# Patient Record
Sex: FEMALE | Race: BLACK OR AFRICAN AMERICAN | Employment: UNEMPLOYED | URBAN - METROPOLITAN AREA
[De-identification: names, ages, dates, MRNs, and addresses within clinical notes are randomized per-mention and may not be internally consistent; named-entity substitution may affect disease eponyms.]

---

## 2017-02-14 NOTE — TELEPHONE ENCOUNTER
Patient states the pharmacy needs a new prescription for True Metric meter, test strips and lancets.

## 2017-02-15 RX ORDER — LANCETS 28 GAUGE
EACH MISCELLANEOUS
Qty: 100 LANCET | Refills: 6 | Status: SHIPPED | OUTPATIENT
Start: 2017-02-15 | End: 2017-05-03 | Stop reason: SDUPTHER

## 2017-02-15 RX ORDER — BLOOD-GLUCOSE METER
EACH MISCELLANEOUS
Qty: 1 EACH | Refills: 0 | Status: SHIPPED | OUTPATIENT
Start: 2017-02-15 | End: 2017-12-04

## 2017-03-02 ENCOUNTER — OFFICE VISIT (OUTPATIENT)
Dept: NEUROLOGY | Age: 57
End: 2017-03-02

## 2017-03-02 VITALS
SYSTOLIC BLOOD PRESSURE: 136 MMHG | HEIGHT: 65 IN | DIASTOLIC BLOOD PRESSURE: 74 MMHG | WEIGHT: 118 LBS | BODY MASS INDEX: 19.66 KG/M2 | HEART RATE: 74 BPM | OXYGEN SATURATION: 96 %

## 2017-03-02 DIAGNOSIS — F01.50 VASCULAR DEMENTIA WITHOUT BEHAVIORAL DISTURBANCE (HCC): Primary | ICD-10-CM

## 2017-03-02 DIAGNOSIS — F32.A DEPRESSION, UNSPECIFIED DEPRESSION TYPE: ICD-10-CM

## 2017-03-02 DIAGNOSIS — G44.221 CHRONIC TENSION-TYPE HEADACHE, INTRACTABLE: ICD-10-CM

## 2017-03-02 RX ORDER — SERTRALINE HYDROCHLORIDE 50 MG/1
50 TABLET, FILM COATED ORAL DAILY
Qty: 30 TAB | Refills: 2 | Status: SHIPPED | OUTPATIENT
Start: 2017-03-02 | End: 2017-05-19 | Stop reason: SDUPTHER

## 2017-03-02 NOTE — MR AVS SNAPSHOT
Visit Information Date & Time Provider Department Dept. Phone Encounter #  
 3/2/2017 10:40 AM Vivian Starkey MD Neurology [facility] [place] [facility] [phone] [id number] Follow-up Instructions Return in about 3 months (around 6/2/2017). Your Appointments 4/12/2017 10:45 AM  
ROUTINE CARE with Hernan Ma MD  
[address] [facility]) Appt Note: 4 month follow up DM  
 [address]  
[phone]  
  
   
 [address] [place] [address] [address] [address] Upcoming Health Maintenance Date Due  
 EYE EXAM RETINAL OR DILATED Q1 9/22/2016 BREAST CANCER SCRN MAMMOGRAM 5/17/2017 LIPID PANEL Q1 5/10/2017 HEMOGLOBIN A1C Q6M 6/14/2017 MICROALBUMIN Q1 7/14/2017 FOOT EXAM Q1 1/17/2018 PAP AKA CERVICAL CYTOLOGY 5/13/2021 COLONOSCOPY 11/12/2025 DTaP/Tdap/Td series (2 - Td) 12/14/2026 Allergies as of 3/2/2017  Review Complete On: 3/2/2017 By: Villa Rhoades LPN Severity Noted Reaction Type Reactions Namenda [Memantine]  11/17/2016    Other (comments) Side effects Current Immunizations  Reviewed on 12/14/2016 No immunizations on file. Not reviewed this visit Vitals BP  
  
  
  
  
  
 136/74 (BP 1 Location: Right arm, BP Patient Position: Sitting) Vitals History BMI and BSA Data Body Mass Index Body Surface Area 19.64 kg/m 2 1.57 m 2 Preferred Pharmacy Pharmacy Name Phone [facility]ACY [facility][id number]- [address] AT [place] [address] [phone] Your Updated Medication List  
  
   
This list is accurate as of: 3/2/17 11:07 AM.  Always use your most recent med list.  
  
  
  
  
 atorvastatin 10 mg tablet Commonly known as:  LIPITOR  
TAKE 1 TABLET BY MOUTH DAILY. * Blood-Glucose Meter monitoring kit Test daily or as directed * Blood-Glucose Meter Misc Commonly known as:  TRUE METRIX GLUCOSE METER Test daily. butalbital-acetaminophen-caffeine -40 mg per tablet Commonly known as:  [employer] Take 1 Tab by mouth every six (6) hours as needed for Pain. Max Daily Amount: 4 Tabs. clopidogrel 75 mg Tab Commonly known as:  PLAVIX TAKE 1 TAB BY MOUTH DAILY. FOR STROKE PREVENTION. donepezil 10 mg tablet Commonly known as:  ARICEPT  
TAKE 1 TABLET BY MOUTH AT NIGHT * glucose blood VI test strips strip Commonly known as:  blood glucose test  
Test daily or as directed * glucose blood VI test strips strip Commonly known as:  TRUE METRIX GLUCOSE TEST STRIP Test daily. metFORMIN  mg tablet Commonly known as:  GLUCOPHAGE XR  
TAKE 2 TABLETS BY MOUTH ONCE A DAY  
  
 metoprolol tartrate 25 mg tablet Commonly known as:  LOPRESSOR  
TAKE 0.5 TABS BY MOUTH TWO (2) TIMES A DAY. multivitamin tablet Commonly known as:  ONE A DAY Take 1 Tab by mouth daily. * ONETOUCH DELICA LANCETS 30 gauge Misc Generic drug:  lancets USE AS DIRECTED * lancets 28 gauge Misc Test daily. * Notice: This list has 6 medication(s) that are the same as other medications prescribed for you. Read the directions carefully, and ask your doctor or other care provider to review them with you. Follow-up Instructions Return in about 3 months (around 6/2/2017). Introducing [address]! Dear Sesar Johnson: Thank you for requesting a [employer] account. Our records indicate that you already have an active Inter[employer] account. You can access your account anytime at [email or web address] Did you know that you can access your hospital and ER discharge instructions at any time in Intercasting? You can also review all of your test results from your hospital stay or ER visit. Additional Information If you have questions, please visit the Frequently Asked Questions section of the [facility] website at [email or web address] Remember, [facility] is NOT to be used for urgent needs. For medical emergencies, dial 911. Now available from your iPhone and Android! Please provide this summary of care documentation to your next provider. Your primary care clinician is listed as Yoanna Simpson. If you have any questions after today's visit, please call [phone].

## 2017-03-02 NOTE — PROGRESS NOTES
Interval HPI:   This is a 64 y.o. 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 Daughter reports patient tolerating the Aricept 10 mg QHS much better than the Namenda in the past.  Memory seems about the same to her. However, she has noticed that patient has very little motivation to do her normal activities around the house or her exercises as compared to the past.  Daughter didn't notice this in the past, prior to starting Aricept. Asked patient whether she feels down, sad, depressed, reduced motivation, or reduced interest and she says no, just that some days she doesn't feel like doing anything. Daughter says it's everyday for the past 2-3 months. Neuropsychology testing indicated that she has associated mild depression. Other complaint is that she's been having frequent headaches, daily and 2-3 days a week the headache is moderate so daughter gives Fioricet tablet. Pt describes pain as starting in back of neck radiating up back of head. She a CT Neck in March of 2016 for c/o facial swelling, which showed cervical spondylosis. Pt denies having any frequent or severe neck pain. Last issue is that Daughter is interested in becoming Representative Payee for patient, as patient has apparently been late to pay bills, or not able to balance her checking account. Neuropsychology testing indicated that \"while she is competent, she needs assistance with high level decision making and supervision for medications and finances.  She is able to assign a POA, and this should be done soon, if it has not been done so already. \"     Continues taking  Plavix 75 mg/ day, Lipitor 10 mg QHS, BP and DM meds    =================  Brief Hx: Right MCA territory infarction in June 2015, treated at [facility], resultant mild left hemiparesis/ spastic gait. CTA H+N: no aneurysm, mild/ minimal ICA stenosis, right MCA narrowing consistent with prior infarction. Seen by Neuropsychology/ Dr. Ceci Beasley. Dx with mild-moderate dementia (vascular and/or alzheimer's), mild depression. Brief ROS: as above or otherwise negative  There have been no significant changes in PMHx, PSHx, SHx except as noted above. Allergies   Allergen Reactions    Namenda [Memantine] Other (comments)     Side effects     Current Outpatient Prescriptions   Medication Sig Dispense Refill    sertraline (ZOLOFT) 50 mg tablet Take 1 Tab by mouth daily. For depression and frequent headaches 30 Tab 2    Blood-Glucose Meter (TRUE METRIX GLUCOSE METER) misc Test daily. 1 Each 0    glucose blood VI test strips (TRUE METRIX GLUCOSE TEST STRIP) strip Test daily. 100 Strip 6    lancets 28 gauge misc Test daily. 100 Lancet 6    donepezil (ARICEPT) 10 mg tablet TAKE 1 TABLET BY MOUTH AT NIGHT 30 Tab 0    ONETOUCH DELICA LANCETS 30 gauge misc USE AS DIRECTED  99    metFORMIN ER (GLUCOPHAGE XR) 750 mg tablet TAKE 2 TABLETS BY MOUTH ONCE A DAY 60 Tab 5    clopidogrel (PLAVIX) 75 mg tablet TAKE 1 TAB BY MOUTH DAILY. FOR STROKE PREVENTION. 90 Tab 3    metoprolol tartrate (LOPRESSOR) 25 mg tablet TAKE 0.5 TABS BY MOUTH TWO (2) TIMES A DAY. 90 Tab 3    atorvastatin (LIPITOR) 10 mg tablet TAKE 1 TABLET BY MOUTH DAILY. 90 Tab 3    butalbital-acetaminophen-caffeine (FIORICET) -40 mg per tablet Take 1 Tab by mouth every six (6) hours as needed for Pain. Max Daily Amount: 4 Tabs. 30 Tab 2    Blood-Glucose Meter monitoring kit Test daily or as directed 1 Kit 0    glucose blood VI test strips (BLOOD GLUCOSE TEST) strip Test daily or as directed 100 Strip prn    multivitamin (ONE A DAY) tablet Take 1 Tab by mouth daily. Physical Exam  Blood pressure 136/74, pulse 74, height 5' 5\" (1.651 m), weight 53.5 kg (118 lb), SpO2 96 %.     No acute distress, resting, flat affect, doesn't speak much  Head: no right or left occipital or suboccipital tenderness  Skin: no rashes    Focused Neurological Exam     Mental status: Alert and oriented to person, place situation. Language: normal fluency and comprehension; no dysarthria. CNs:   Visual fields grossly normal  Extraocular movements intact, no nystagmus  Face appears symmetric and facial strength normal.    Hearing is intact to casual conversation. Sensory: not tested today    Motor:  Left arm and le/ 5  Right arm and le/ 5  Left side spasticity    Reflexes: increased on left compared to right    Gait: spastic left hemparesis    Impression      ICD-10-CM ICD-9-CM    1. Vascular dementia without behavioral disturbance F01.50 290.40    2. Depression, unspecified depression type F32.9 311 sertraline (ZOLOFT) 50 mg tablet   3. Chronic tension-type headache, intractable G44.221 339.12 sertraline (ZOLOFT) 50 mg tablet        Mixed mild-moderate vascular-alzheimer's dementia by cognitive testing    Continue Aricept 10 mg QHS (didn't tolerate Namenda). Rx'd Zoloft 50 mg QAM for for associated depression and frequent headaches. Encouraged patient to try and get out of the house when weather is good, take a walk, do puzzles at home as all of that helps her memory. Will sign representative payee form when sent by daughter.   Follow up 3 months

## 2017-03-21 ENCOUNTER — OFFICE VISIT (OUTPATIENT)
Dept: FAMILY MEDICINE CLINIC | Age: 57
End: 2017-03-21

## 2017-03-21 VITALS
RESPIRATION RATE: 20 BRPM | BODY MASS INDEX: 19.79 KG/M2 | WEIGHT: 118.8 LBS | HEIGHT: 65 IN | HEART RATE: 63 BPM | TEMPERATURE: 98 F | SYSTOLIC BLOOD PRESSURE: 132 MMHG | DIASTOLIC BLOOD PRESSURE: 71 MMHG

## 2017-03-21 DIAGNOSIS — L02.419 ABSCESS OF AXILLARY REGION: Primary | ICD-10-CM

## 2017-03-21 RX ORDER — METOPROLOL TARTRATE 25 MG/1
TABLET, FILM COATED ORAL
COMMUNITY
Start: 2016-12-19 | End: 2016-12-19

## 2017-03-21 RX ORDER — SULFAMETHOXAZOLE AND TRIMETHOPRIM 800; 160 MG/1; MG/1
1 TABLET ORAL 2 TIMES DAILY
Qty: 20 TAB | Refills: 0 | Status: SHIPPED | OUTPATIENT
Start: 2017-03-21 | End: 2017-03-31

## 2017-03-21 RX ORDER — BUTALBITAL, ACETAMINOPHEN AND CAFFEINE 50; 325; 40 MG/1; MG/1; MG/1
CAPSULE ORAL
COMMUNITY
Start: 2016-12-19 | End: 2016-12-19

## 2017-03-21 RX ORDER — BISMUTH SUBSALICYLATE 262 MG
TABLET,CHEWABLE ORAL DAILY
COMMUNITY
Start: 2016-12-19 | End: 2016-12-19

## 2017-03-21 RX ORDER — DONEPEZIL HYDROCHLORIDE 10 MG/1
TABLET, FILM COATED ORAL
COMMUNITY
Start: 2016-12-19 | End: 2016-12-19

## 2017-03-21 RX ORDER — METFORMIN HYDROCHLORIDE 750 MG/1
TABLET, EXTENDED RELEASE ORAL
COMMUNITY
Start: 2016-12-19 | End: 2016-12-19

## 2017-03-21 RX ORDER — ATORVASTATIN CALCIUM 10 MG/1
10 TABLET, FILM COATED ORAL DAILY
COMMUNITY
Start: 2016-12-19 | End: 2016-12-19

## 2017-03-21 RX ORDER — CLOPIDOGREL BISULFATE 75 MG/1
TABLET ORAL DAILY
COMMUNITY
Start: 2016-12-19 | End: 2016-12-19

## 2017-03-21 RX ORDER — UREA 40 %
CREAM (GRAM) TOPICAL
Refills: 1 | COMMUNITY
Start: 2017-01-18 | End: 2017-12-04

## 2017-03-21 NOTE — PROGRESS NOTES
HISTORY OF PRESENT ILLNESS  Jeanette Green is a 64 y.o. female. Cyst   The history is provided by the patient and relative (her daughter is with her. She has a cyst under her left arm.). This is a new problem. Episode onset: several days ago. Episode frequency: intermittently. The problem has been gradually improving. Associated symptoms comments: She is not sure if it has been draining, but it was sore yesterday, but not today. . Nothing aggravates the symptoms. Nothing relieves the symptoms. She has tried nothing for the symptoms. Review of Systems   Constitutional: Negative for chills and fever. Skin:        Axillary cyst, as previously mentioned       Visit Vitals    /71 (BP 1 Location: Right arm, BP Patient Position: Sitting)    Pulse 63    Temp 98 °F (36.7 °C) (Oral)    Resp 20    Ht 5' 5\" (1.651 m)    Wt 118 lb 12.8 oz (53.9 kg)    BMI 19.77 kg/m2     Physical Exam   Constitutional: She is oriented to person, place, and time. She appears well-developed and well-nourished. No distress. Neurological: She is alert and oriented to person, place, and time. Skin: She is not diaphoretic. Tender, marble sized, left axillary abscess, freely draining pus. ASSESSMENT and PLAN    ICD-10-CM ICD-9-CM    1. Abscess of axillary region L02.419 682.3 trimethoprim-sulfamethoxazole (BACTRIM DS) 160-800 mg per tablet      AEROBIC BACTERIAL CULTURE        Draining, no longer painful  Frequent, warm compresses to encourage drainage  Bactrim  Culture     Follow-up Disposition:  Return if symptoms worsen or fail to improve. Reviewed plan of care. Patient has provided input and agrees with goals.

## 2017-03-21 NOTE — MR AVS SNAPSHOT
Visit Information Date & Time Provider Department Dept. Phone Encounter #  
 3/21/2017  1:15 PM Lissette Agarwal [address] [id number] Follow-up Instructions Return if symptoms worsen or fail to improve. Your Appointments 4/12/2017 10:45 AM  
ROUTINE CARE with Lissette Agarwal MD  
[address]) Appt Note: 4 month follow up DM  
 [address] [facility] [address]  
[phone]  
  
   
 [facility] [address] [address]  
  
    
 6/8/2017 10:40 AM  
Follow Up with Lori Stewart MD  
Neurology [place] [address] ([address]) Appt Note: follow up dementia [place] [address] [facility] [address] [address] [phone]  
  
   
 [place] [address] [id number] [address] Upcoming Health Maintenance Date Due  
 EYE EXAM RETINAL OR DILATED Q1 9/22/2016 BREAST CANCER SCRN MAMMOGRAM 5/17/2017 LIPID PANEL Q1 5/10/2017 HEMOGLOBIN A1C Q6M 6/14/2017 MICROALBUMIN Q1 7/14/2017 FOOT EXAM Q1 1/17/2018 PAP AKA CERVICAL CYTOLOGY 5/13/2021 COLONOSCOPY 11/12/2025 DTaP/Tdap/Td series (2 - Td) 12/14/2026 Allergies as of 3/21/2017  Review Complete On: 3/21/2017 By: Lissette Agarwal MD  
  
 Severity Noted Reaction Type Reactions Namenda [Memantine]  11/17/2016    Other (comments) Side effects Current Immunizations  Reviewed on 12/14/2016 No immunizations on file. Not reviewed this visit You Were Diagnosed With   
  
 Codes Comments Abscess of axillary region    -  Primary ICD-10-CM: L02.419 ICD-9-CM: 887. 3 Vitals BP Pulse Temp Resp Height(growth percentile) Weight(growth percentile) 132/71 (BP 1 Location: Right arm, BP Patient Position: Sitting) 63 98 °F (36.7 °C) (Oral) 20 5' 5\" (1.651 m) 118 lb 12.8 oz (53.9 kg) BMI OB Status Smoking Status 19.77 kg/m2 Postmenopausal Former Smoker Vitals History BMI and BSA Data Body Mass Index Body Surface Area  
 19.77 kg/m 2 1.57 m 2 Preferred Pharmacy Pharmacy Name Phone [id number] [address] AT [address] [phone] Your Updated Medication List  
  
   
This list is accurate as of: 3/21/17  2:42 PM.  Always use your most recent med list.  
  
  
  
  
 atorvastatin 10 mg tablet Commonly known as:  LIPITOR  
TAKE 1 TABLET BY MOUTH DAILY. * Blood-Glucose Meter monitoring kit Test daily or as directed * Blood-Glucose Meter Misc Commonly known as:  TRUE METRIX GLUCOSE METER Test daily. butalbital-acetaminophen-caffeine -40 mg per tablet Commonly known as:  [employer] Take 1 Tab by mouth every six (6) hours as needed for Pain. Max Daily Amount: 4 Tabs. clopidogrel 75 mg Tab Commonly known as:  PLAVIX TAKE 1 TAB BY MOUTH DAILY. FOR STROKE PREVENTION. donepezil 10 mg tablet Commonly known as:  ARICEPT  
TAKE 1 TABLET BY MOUTH AT NIGHT  
  
 glucose blood VI test strips strip Commonly known as:  TRUE METRIX GLUCOSE TEST STRIP Test daily. lancets 28 gauge Misc Test daily. metFORMIN  mg tablet Commonly known as:  GLUCOPHAGE XR  
TAKE 2 TABLETS BY MOUTH ONCE A DAY  
  
 metoprolol tartrate 25 mg tablet Commonly known as:  LOPRESSOR  
TAKE 0.5 TABS BY MOUTH TWO (2) TIMES A DAY. multivitamin tablet Commonly known as:  ONE A DAY Take 1 Tab by mouth daily. sertraline 50 mg tablet Commonly known as:  ZOLOFT Take 1 Tab by mouth daily. For depression and frequent headaches  
  
 trimethoprim-sulfamethoxazole 160-800 mg per tablet Commonly known as:  BACTRIM DS Take 1 Tab by mouth two (2) times a day for 10 days. urea 40 % topical cream  
Commonly known as:  CARMOL  
APPLY TO AFFECTED AREA TWICE DAILY * Notice: This list has 2 medication(s) that are the same as other medications prescribed for you. Read the directions carefully, and ask your doctor or other care provider to review them with you. Prescriptions Sent to Pharmacy Refills  
 trimethoprim-sulfamethoxazole (BACTRIM DS) 160-800 mg per tablet 0 Sig: Take 1 Tab by mouth two (2) times a day for 10 days. Class: Normal  
 Pharmacy: [facility]/pharmacy #[id number] [address] [place] [address] #: [phone] Route: Oral  
  
Follow-up Instructions Return if symptoms worsen or fail to improve. Introducing [facility]! Dear Cindy Santana: Thank you for requesting a mWater account. Our records indicate that you already have an active mWater account. You can access your account anytime at [email or web address] Did you know that you can access your hospital and ER discharge instructions at any time in mWater? You can also review all of your test results from your hospital stay or ER visit. Additional Information If you have questions, please visit the Frequently Asked Questions section of the mWater website at [email or web address] Remember, mWater is NOT to be used for urgent needs. For medical emergencies, dial 911. Now available from your iPhone and Android! Please provide this summary of care documentation to your next provider. Your primary care clinician is listed as Linda Cardoza. If you have any questions after today's visit, please call [phone].

## 2017-03-24 LAB — BACTERIA SPEC AEROBE CULT: NORMAL

## 2017-04-12 ENCOUNTER — OFFICE VISIT (OUTPATIENT)
Dept: FAMILY MEDICINE CLINIC | Age: 57
End: 2017-04-12

## 2017-04-12 VITALS
WEIGHT: 117.6 LBS | TEMPERATURE: 98.1 F | HEART RATE: 60 BPM | DIASTOLIC BLOOD PRESSURE: 60 MMHG | BODY MASS INDEX: 19.59 KG/M2 | SYSTOLIC BLOOD PRESSURE: 127 MMHG | HEIGHT: 65 IN | RESPIRATION RATE: 16 BRPM

## 2017-04-12 DIAGNOSIS — E11.9 TYPE 2 DIABETES MELLITUS WITHOUT COMPLICATION, WITHOUT LONG-TERM CURRENT USE OF INSULIN (HCC): Primary | ICD-10-CM

## 2017-04-12 DIAGNOSIS — Z12.39 SCREENING FOR BREAST CANCER: ICD-10-CM

## 2017-04-12 DIAGNOSIS — I10 ESSENTIAL HYPERTENSION: ICD-10-CM

## 2017-04-12 RX ORDER — LANCETS 30 GAUGE
EACH MISCELLANEOUS
Refills: 6 | COMMUNITY
Start: 2017-03-27 | End: 2017-12-04

## 2017-04-12 NOTE — PROGRESS NOTES
HISTORY OF PRESENT ILLNESS  Cora Suarez is a 64 y.o. female. Diabetes   The history is provided by the patient (her FBS have mostly been in the 80's to 130's, recently lower, including a number of them being below 70). This is a chronic problem. The current episode started more than 1 week ago. The problem occurs constantly. The problem has been gradually improving. Pertinent negatives include no chest pain, no abdominal pain, no headaches and no shortness of breath. Nothing aggravates the symptoms. The symptoms are relieved by medications. Treatments tried: metformin. The treatment provided significant relief. Review of Systems   Constitutional: Negative for weight loss. No weight gain   Eyes: Negative for blurred vision. Respiratory: Negative for shortness of breath. Cardiovascular: Negative for chest pain and leg swelling. Gastrointestinal: Negative for abdominal pain. Genitourinary:        No polyuria   Neurological: Negative for dizziness, sensory change, speech change, focal weakness and headaches. Endo/Heme/Allergies: Positive for polydipsia. Visit Vitals    /60 (BP 1 Location: Right arm, BP Patient Position: Sitting)    Pulse 60    Temp 98.1 °F (36.7 °C) (Oral)    Resp 16    Ht 5' 5\" (1.651 m)    Wt 117 lb 9.6 oz (53.3 kg)    BMI 19.57 kg/m2     Physical Exam   Constitutional: She is oriented to person, place, and time. She appears well-developed and well-nourished. No distress. Cardiovascular: Normal rate, regular rhythm and normal heart sounds. Exam reveals no gallop and no friction rub. No murmur heard. Pulmonary/Chest: Effort normal and breath sounds normal. No respiratory distress. She has no wheezes. She has no rales. Musculoskeletal: She exhibits no edema. Neurological: She is alert and oriented to person, place, and time. Skin: Skin is warm and dry. She is not diaphoretic. Nursing note and vitals reviewed.       ASSESSMENT and PLAN ICD-10-CM ICD-9-CM    1. Type 2 diabetes mellitus without complication, without long-term current use of insulin (HCC) U81.5 672.95 METABOLIC PANEL, BASIC      LIPID PANEL      HEPATIC FUNCTION PANEL      HEMOGLOBIN A1C WITH EAG   2. Essential hypertension I46 265.4 METABOLIC PANEL, BASIC   3. Screening for breast cancer Z12.39 V76.10 SIVAKUMAR MAMMO BI SCREENING INCL CAD        Over controlled diabetes  Blood pressure controlled  Stop metformin  Labs per orders. Mammogram     Follow-up Disposition:  Return in about 4 months (around 8/12/2017) for diabetes. Reviewed plan of care. Patient has provided input and agrees with goals.

## 2017-04-12 NOTE — MR AVS SNAPSHOT
Visit Information Date & Time Provider Department Dept. Phone Encounter #  
 4/12/2017 10:45 AM Lupe Chatman MD [address] [place] [address] [id number] Follow-up Instructions Return in about 4 months (around 8/12/2017) for diabetes. Your Appointments 6/8/2017 10:40 AM  
Follow Up with Gregorio Moncada MD  
Neurology [address] ([facility]) Appt Note: follow up dementia [place] [address] [facility] 99 [id number] [phone]  
  
   
 [place] [address] [id number] [address] Upcoming Health Maintenance Date Due  
 EYE EXAM RETINAL OR DILATED Q1 9/22/2016 LIPID PANEL Q1 5/10/2017 BREAST CANCER SCRN MAMMOGRAM 5/17/2017 HEMOGLOBIN A1C Q6M 6/14/2017 MICROALBUMIN Q1 7/14/2017 FOOT EXAM Q1 1/17/2018 PAP AKA CERVICAL CYTOLOGY 5/13/2021 COLONOSCOPY 11/12/2025 DTaP/Tdap/Td series (2 - Td) 12/14/2026 Allergies as of 4/12/2017  Review Complete On: 4/12/2017 By: Lupe Chatman MD  
  
 Severity Noted Reaction Type Reactions Namenda [Memantine]  11/17/2016    Other (comments) Side effects Current Immunizations  Reviewed on 12/14/2016 No immunizations on file. Not reviewed this visit You Were Diagnosed With   
  
 Codes Comments Type 2 diabetes mellitus without complication, without long-term current use of insulin (HCC)    -  Primary ICD-10-CM: E11.9 ICD-9-CM: 250.00 Essential hypertension     ICD-10-CM: I10 
ICD-9-CM: 401.9 Screening for breast cancer     ICD-10-CM: Z12.39 
ICD-9-CM: V76.10 Vitals BP Pulse Temp Resp Height(growth percentile) Weight(growth percentile) 127/60 (BP 1 Location: Right arm, BP Patient Position: Sitting) 60 98.1 °F (36.7 °C) (Oral) 16 5' 5\" (1.651 m) 117 lb 9.6 oz (53.3 kg) BMI OB Status Smoking Status 19.57 kg/m2 Postmenopausal Former Smoker BMI and BSA Data Body Mass Index Body Surface Area  
 19.57 kg/m 2 1.56 m 2 Preferred Pharmacy [facility] Phone [id number] [address] [facility] [phone] Your Updated Medication List  
  
   
This list is accurate as of: 4/12/17 11:57 AM.  Always use your most recent med list.  
  
  
  
  
 atorvastatin 10 mg tablet Commonly known as:  LIPITOR  
TAKE 1 TABLET BY MOUTH DAILY. * Blood-Glucose Meter monitoring kit Test daily or as directed * Blood-Glucose Meter Misc Commonly known as:  TRUE METRIX GLUCOSE METER Test daily. butalbital-acetaminophen-caffeine -40 mg per tablet Commonly known as:  [employer] Take 1 Tab by mouth every six (6) hours as needed for Pain. Max Daily Amount: 4 Tabs. clopidogrel 75 mg Tab Commonly known as:  PLAVIX TAKE 1 TAB BY MOUTH DAILY. FOR STROKE PREVENTION. donepezil 10 mg tablet Commonly known as:  ARICEPT  
TAKE 1 TABLET BY MOUTH AT NIGHT  
  
 glucose blood VI test strips strip Commonly known as:  TRUE METRIX GLUCOSE TEST STRIP Test daily. * lancets 28 gauge Misc Test daily. * ULTRA THIN LANCETS 30 gauge Misc Generic drug:  lancets USE AS DIRECTED DAILY  
  
 metoprolol tartrate 25 mg tablet Commonly known as:  LOPRESSOR  
TAKE 0.5 TABS BY MOUTH TWO (2) TIMES A DAY. multivitamin tablet Commonly known as:  ONE A DAY Take 1 Tab by mouth daily. sertraline 50 mg tablet Commonly known as:  ZOLOFT Take 1 Tab by mouth daily. For depression and frequent headaches  
  
 urea 40 % topical cream  
Commonly known as:  CARMOL  
APPLY TO AFFECTED AREA TWICE DAILY * Notice: This list has 4 medication(s) that are the same as other medications prescribed for you. Read the directions carefully, and ask your doctor or other care provider to review them with you. We Performed the Following HEMOGLOBIN A1C WITH EAG [66770 CPT(R)] HEPATIC FUNCTION PANEL [12349 CPT(R)] LIPID PANEL [32706 CPT(R)] METABOLIC PANEL, BASIC [57489 CPT(R)] Follow-up Instructions Return in about 4 months (around 8/12/2017) for diabetes. To-Do List   
 04/17/2017 Imaging:  SIVAKUMAR MAMMO BI SCREENING INCL CAD Introducing [facility]! Dear Radha Tracey: Thank you for requesting a [employer] account. Our records indicate that you already have an active [employer] Integrated account. You can access your account anytime at [email or web address] Did you know that you can access your hospital and ER discharge instructions at any time in [employer] Integrated? You can also review all of your test results from your hospital stay or ER visit. Additional Information If you have questions, please visit the Frequently Asked Questions section of the [employer] website at [email or web address] Remember, [employer] Integrated is NOT to be used for urgent needs. For medical emergencies, dial 911. Now available from your iPhone and Android! Please provide this summary of care documentation to your next provider. Your primary care clinician is listed as Deborah Martinez. If you have any questions after today's visit, please call [phone].

## 2017-04-15 LAB
ALBUMIN SERPL-MCNC: 4.1 G/DL (ref 3.5–5.5)
ALP SERPL-CCNC: 73 IU/L (ref 39–117)
ALT SERPL-CCNC: 60 IU/L (ref 0–32)
AST SERPL-CCNC: 51 IU/L (ref 0–40)
BILIRUB DIRECT SERPL-MCNC: 0.08 MG/DL (ref 0–0.4)
BILIRUB SERPL-MCNC: 0.3 MG/DL (ref 0–1.2)
BUN SERPL-MCNC: 12 MG/DL (ref 6–24)
BUN/CREAT SERPL: 15 (ref 9–23)
CALCIUM SERPL-MCNC: 9.2 MG/DL (ref 8.7–10.2)
CHLORIDE SERPL-SCNC: 104 MMOL/L (ref 96–106)
CHOLEST SERPL-MCNC: 102 MG/DL (ref 100–199)
CO2 SERPL-SCNC: 25 MMOL/L (ref 18–29)
CREAT SERPL-MCNC: 0.79 MG/DL (ref 0.57–1)
EST. AVERAGE GLUCOSE BLD GHB EST-MCNC: 143 MG/DL
GLUCOSE SERPL-MCNC: 113 MG/DL (ref 65–99)
HBA1C MFR BLD: 6.6 % (ref 4.8–5.6)
HDLC SERPL-MCNC: 48 MG/DL
INTERPRETATION, 910389: NORMAL
LDLC SERPL CALC-MCNC: 37 MG/DL (ref 0–99)
Lab: NORMAL
POTASSIUM SERPL-SCNC: 4.5 MMOL/L (ref 3.5–5.2)
PROT SERPL-MCNC: 7 G/DL (ref 6–8.5)
SODIUM SERPL-SCNC: 143 MMOL/L (ref 134–144)
TRIGL SERPL-MCNC: 85 MG/DL (ref 0–149)
VLDLC SERPL CALC-MCNC: 17 MG/DL (ref 5–40)

## 2017-05-04 RX ORDER — LANCETS 28 GAUGE
EACH MISCELLANEOUS
Qty: 100 LANCET | Refills: 6 | Status: SHIPPED | OUTPATIENT
Start: 2017-05-04 | End: 2017-12-04

## 2017-05-23 ENCOUNTER — OFFICE VISIT (OUTPATIENT)
Dept: OBGYN CLINIC | Age: 57
End: 2017-05-23

## 2017-05-23 VITALS
HEART RATE: 59 BPM | SYSTOLIC BLOOD PRESSURE: 122 MMHG | HEIGHT: 65 IN | WEIGHT: 116 LBS | DIASTOLIC BLOOD PRESSURE: 52 MMHG | BODY MASS INDEX: 19.33 KG/M2

## 2017-05-23 DIAGNOSIS — Z01.419 ENCOUNTER FOR GYNECOLOGICAL EXAMINATION: Primary | ICD-10-CM

## 2017-05-23 RX ORDER — METFORMIN HYDROCHLORIDE 750 MG/1
TABLET, EXTENDED RELEASE ORAL
Refills: 5 | COMMUNITY
Start: 2017-03-12 | End: 2017-09-12

## 2017-05-23 NOTE — MR AVS SNAPSHOT
Visit Information Date & Time Provider Department Dept. Phone Encounter #  
 5/23/2017  2:00 PM Lisbet Romano, [address] [phone] [id number] Your Appointments 6/8/2017 10:40 AM  
Follow Up with Ruy Mello MD  
Neurology [address] ([address]) Appt Note: follow up dementia [place] [address] [facility] [id number] [phone]  
  
   
 [place] [address] [id number] [address] 8/14/2017  9:00 AM  
ROUTINE CARE with Letty Goltz, MD  
[address]) Appt Note: 4 month f/u DM  
 [address]  
[phone]  
  
   
 [address] [place] [address] [address] Upcoming Health Maintenance Date Due  
 BREAST CANCER SCRN MAMMOGRAM 5/17/2017 INFLUENZA AGE 9 TO ADULT 8/1/2017 PAP AKA CERVICAL CYTOLOGY 5/13/2021 COLONOSCOPY 11/12/2025 Allergies as of 5/23/2017  Review Complete On: 5/23/2017 By: Mariana Rubio Severity Noted Reaction Type Reactions Namenda [Memantine]  11/17/2016    Other (comments) Side effects Current Immunizations  Reviewed on 12/14/2016 No immunizations on file. Not reviewed this visit Vitals BP Pulse Height(growth percentile) Weight(growth percentile) BMI OB Status 122/52 (!) 59 5' 5\" (1.651 m) 116 lb (52.6 kg) 19.3 kg/m2 Postmenopausal  
 Smoking Status Former Smoker Vitals History BMI and BSA Data Body Mass Index Body Surface Area  
 19.3 kg/m 2 1.55 m 2 Preferred Pharmacy Pharmacy Name Phone [id number] [address] AT [place] [address] [phone] Your Updated Medication List  
  
   
This list is accurate as of: 5/23/17  2:26 PM.  Always use your most recent med list.  
  
  
  
  
 atorvastatin 10 mg tablet Commonly known as:  LIPITOR  
TAKE 1 TABLET BY MOUTH DAILY. * Blood-Glucose Meter monitoring kit Test daily or as directed * Blood-Glucose Meter Misc Commonly known as:  TRUE METRIX GLUCOSE METER Test daily. butalbital-acetaminophen-caffeine -40 mg per tablet Commonly known as:  [employer] Take 1 Tab by mouth every six (6) hours as needed for Pain. Max Daily Amount: 4 Tabs. clopidogrel 75 mg Tab Commonly known as:  PLAVIX TAKE 1 TAB BY MOUTH DAILY. FOR STROKE PREVENTION. donepezil 10 mg tablet Commonly known as:  ARICEPT  
TAKE 1 TABLET BY MOUTH AT NIGHT  
  
 glucose blood VI test strips strip Commonly known as:  TRUE METRIX GLUCOSE TEST STRIP Test daily. metFORMIN  mg tablet Commonly known as:  GLUCOPHAGE XR  
TAKE 2 TABLETS BY MOUTH ONCE A DAY  
  
 metoprolol tartrate 25 mg tablet Commonly known as:  LOPRESSOR  
TAKE 0.5 TABS BY MOUTH TWO (2) TIMES A DAY. multivitamin tablet Commonly known as:  ONE A DAY Take 1 Tab by mouth daily. sertraline 50 mg tablet Commonly known as:  ZOLOFT  
TAKE 1 TAB BY MOUTH DAILY. FOR DEPRESSION AND FREQUENT HEADACHES  
  
 * ULTRA THIN LANCETS 30 gauge Misc Generic drug:  lancets USE AS DIRECTED DAILY  
  
 * lancets 28 gauge Misc Test daily. urea 40 % topical cream  
Commonly known as:  CARMOL  
APPLY TO AFFECTED AREA TWICE DAILY * Notice: This list has 4 medication(s) that are the same as other medications prescribed for you. Read the directions carefully, and ask your doctor or other care provider to review them with you. Patient Instructions Well Visit, Women 48 to 72: Care Instructions Your Care Instructions Physical exams can help you stay healthy. Your doctor has checked your overall health and may have suggested ways to take good care of yourself. He or she also may have recommended tests.  At home, you can help prevent illness with healthy eating, regular exercise, and other steps. Follow-up care is a key part of your treatment and safety. Be sure to make and go to all appointments, and call your doctor if you are having problems. It's also a good idea to know your test results and keep a list of the medicines you take. How can you care for yourself at home? · Reach and stay at a healthy weight. This will lower your risk for many problems, such as obesity, diabetes, heart disease, and high blood pressure. · Get at least 30 minutes of exercise on most days of the week. Walking is a good choice. You also may want to do other activities, such as running, swimming, cycling, or playing tennis or team sports. · Do not smoke. Smoking can make health problems worse. If you need help quitting, talk to your doctor about stop-smoking programs and medicines. These can increase your chances of quitting for good. · Protect your skin from too much sun. When you're outdoors from 10 a.m. to 4 p.m., stay in the shade or cover up with clothing and a hat with a wide brim. Wear sunglasses that block UV rays. Even when it's cloudy, put broad-spectrum sunscreen (SPF 30 or higher) on any exposed skin. · See a dentist one or two times a year for checkups and to have your teeth cleaned. · Wear a seat belt in the car. · Limit alcohol to 1 drink a day. Too much alcohol can cause health problems. Follow your doctor's advice about when to have certain tests. These tests can spot problems early. · Cholesterol. Your doctor will tell you how often to have this done based on your age, family history, or other things that can increase your risk for heart attack and stroke. · Blood pressure. Have your blood pressure checked during a routine doctor visit. Your doctor will tell you how often to check your blood pressure based on your age, your blood pressure results, and other factors. · Mammogram. Ask your doctor how often you should have a mammogram, which is an X-ray of your breasts. A mammogram can spot breast cancer before it can be felt and when it is easiest to treat. · Pap test and pelvic exam. Ask your doctor how often you should have a Pap test. You may not need to have a Pap test as often as you used to. · Vision. Have your eyes checked every year or two or as often as your doctor suggests. Some experts recommend that you have yearly exams for glaucoma and other age-related eye problems starting at age 48. · Hearing. Tell your doctor if you notice any change in your hearing. You can have tests to find out how well you hear. · Diabetes. Ask your doctor whether you should have tests for diabetes. · Colon cancer. You should begin tests for colon cancer at age 48. You may have one of several tests. Your doctor will tell you how often to have tests based on your age and risk. Risks include whether you already had a precancerous polyp removed from your colon or whether your parents, sisters and brothers, or children have had colon cancer. · Thyroid disease. Talk to your doctor about whether to have your thyroid checked as part of a regular physical exam. Women have an increased chance of a thyroid problem. · Osteoporosis. You should begin tests for bone density at age 72. If you are younger than 72, ask your doctor whether you have factors that may increase your risk for this disease. You may want to have this test before age 72. · Heart attack and stroke risk. At least every 4 to 6 years, you should have your risk for heart attack and stroke assessed. Your doctor uses factors such as your age, blood pressure, cholesterol, and whether you smoke or have diabetes to show what your risk for a heart attack or stroke is over the next 10 years. When should you call for help?  
Watch closely for changes in your health, and be sure to contact your doctor if you have any problems or symptoms that concern you. Where can you learn more? Go to http://cortez-patricia.info/. Enter A637 in the search box to learn more about \"Well Visit, Women 50 to 72: Care Instructions. \" Current as of: July 19, 2016 Content Version: 11.2 © 9677-6752 [facility]. Care instructions adapted under license by [employer] (which disclaims liability or warranty for this information). If you have questions about a medical condition or this instruction, always ask your healthcare professional. [address] any warranty or liability for your use of this information. Introducing [facility]! Dear Dianna Camacho: Thank you for requesting a [employer] account. Our records indicate that you already have an active [employer] account. You can access your account anytime at [email or web address] Did you know that you can access your hospital and ER discharge instructions at any time in [employer]? You can also review all of your test results from your hospital stay or ER visit. Additional Information If you have questions, please visit the Frequently Asked Questions section of the [employer] website at [email or web address] Remember, [employer] is NOT to be used for urgent needs. For medical emergencies, dial 911. Now available from your iPhone and Android! Please provide this summary of care documentation to your next provider. Your primary care clinician is listed as Sinai Nuñez. If you have any questions after today's visit, please call [phone].

## 2017-05-23 NOTE — PROGRESS NOTES
Annual exam ages 40-58    Washington Rosen is a [id number],  62 y.o. female [address] No LMP recorded. Patient is postmenopausal., who presents for her annual checkup. Went through menopause several years ago. Still having vasomotor sx. Has never been on HRT. Has h/o stroke (was smoking and drinking at that time). Since her last annual GYN exam about one year ago on 16,  she has the following changes in her health history: none. ADDITIONAL CONCERNS:  She is having hot flashes, night sweats, headaches and left eye is watering for 2 days. With regard to the Gardasil vaccine, she is older than the age for which it is FDA approved. Menstrual status:    Has gone through menopause. Contraception:    The current method of family planning is abstinence and post menopausal status. Sexual history:     reports that she currently engages in sexual activity and has had male partners. She reports using the following method of birth control/protection: None. Pap and Mammogram History:    Her most recent Pap smear was normal, HPV was negative, obtained 1 year(s) ago on 16. The patient had her mammogram today in our office. Osteoporosis History:    Family history does not include a first or second degree relative with osteopenia or osteoporosis. Past Medical History:   Diagnosis Date    Aneurysm (arteriovenous) of coronary vessels     Right side    Depression     on Zoloft    Essential hypertension 2016    Hx of mammogram 16    Negative     Routine Papanicolaou smear 16    Negative ; HPV Negative     Stroke ([facility] 06/03/15    Type 2 diabetes mellitus without complication ([facility] 5373     History reviewed. No pertinent surgical history.   OB History    Para Term  AB SAB TAB Ectopic Multiple Living   5 4 4  1 1    4      # Outcome Date GA Lbr Billy/2nd Weight Sex Delivery Anes PTL Lv   5 SAB            4 Term     F Vag-Spont EPI N Y   3 Term     F Vag-Spont EPI N Y   2 Term     F Vag-Spont  N Y   1 Term     F Vag-Spont EPIDURAL AN N Y          Current Outpatient Prescriptions   Medication Sig Dispense Refill    sertraline (ZOLOFT) 50 mg tablet TAKE 1 TAB BY MOUTH DAILY. FOR DEPRESSION AND FREQUENT HEADACHES 30 Tab 0    metFORMIN ER (GLUCOPHAGE XR) 750 mg tablet TAKE 2 TABLETS BY MOUTH ONCE A DAY  5    glucose blood VI test strips (TRUE METRIX GLUCOSE TEST STRIP) strip Test daily. 100 Strip 6    lancets 28 gauge misc Test daily. 100 Lancet 6    ULTRA THIN LANCETS 30 gauge misc USE AS DIRECTED DAILY  6    urea (CARMOL) 40 % topical cream APPLY TO AFFECTED AREA TWICE DAILY  1    donepezil (ARICEPT) 10 mg tablet TAKE 1 TABLET BY MOUTH AT NIGHT 30 Tab 2    Blood-Glucose Meter (TRUE METRIX GLUCOSE METER) misc Test daily. 1 Each 0    clopidogrel (PLAVIX) 75 mg tablet TAKE 1 TAB BY MOUTH DAILY. FOR STROKE PREVENTION. 90 Tab 3    metoprolol tartrate (LOPRESSOR) 25 mg tablet TAKE 0.5 TABS BY MOUTH TWO (2) TIMES A DAY. 90 Tab 3    atorvastatin (LIPITOR) 10 mg tablet TAKE 1 TABLET BY MOUTH DAILY. 90 Tab 3    butalbital-acetaminophen-caffeine (FIORICET) -40 mg per tablet Take 1 Tab by mouth every six (6) hours as needed for Pain. Max Daily Amount: 4 Tabs. 30 Tab 2    Blood-Glucose Meter monitoring kit Test daily or as directed 1 Kit 0    multivitamin (ONE A DAY) tablet Take 1 Tab by mouth daily. Allergies: Namenda [memantine]   Social History     Social History    Marital status:      Spouse name: N/A    Number of children: N/A    Years of education: N/A     Occupational History    Not on file.      Social History Main Topics    Smoking status: Former Smoker     Quit date: 7/17/2015    Smokeless tobacco: Never Used      Comment: Never used vapor or e-cigs     Alcohol use No    Drug use: No    Sexual activity: Yes     Partners: Male     Birth control/ protection: None     Other Topics Concern    Not on file     Social History Narrative     Tobacco History:  reports that she quit smoking about 22 months ago. She has never used smokeless tobacco.  Alcohol Abuse:  reports that she does not drink alcohol. Drug Abuse:  reports that she does not use illicit drugs.     Patient Active Problem List   Diagnosis Code    Status post stroke Z86.73    Left spastic hemiparesis ([facility] G81.14    Cerebral aneurysm I67.1    Episodic tension-type headache, not intractable G44.219    Essential hypertension I10    Type 2 diabetes mellitus without complication ([facility] Q08.2    Vascular dementia without behavioral disturbance F01.50    Hx of completed stroke Z86.73    Chronic tension-type headache, intractable G44.221     Family History   Problem Relation Age of Onset    Heart Attack Mother     Heart Attack Father     Headache Sister     Migraines Sister     Stroke Sister     Headache Brother     Migraines Brother        Review of Systems - History obtained from the patient  Constitutional: negative for weight loss, fever  HEENT: negative for hearing loss, earache, congestion, snoring, sorethroat; left eye watering  CV: negative for chest pain, palpitations, edema  Resp: negative for cough, shortness of breath, wheezing  GI: negative for change in bowel habits, abdominal pain, black or bloody stools  : negative for frequency, dysuria, hematuria, vaginal discharge  MSK: negative for back pain, joint pain, muscle pain  Breast: negative for breast lumps, nipple discharge, galactorrhea  Skin :negative for itching, rash, hives  Neuro: negative for dizziness, confusion, weakness; +HA  Psych: on Zoloft for depression  Heme/lymph: negative for bleeding, bruising, pallor    Physical Exam    Visit Vitals    /52    Pulse (!) 59    Ht 5' 5\" (1.651 m)    Wt 116 lb (52.6 kg)    BMI 19.3 kg/m2       Constitutional  · Appearance: well-nourished, well developed, alert, in no acute distress    HENT  · Head and Face: appears normal    Neck  · Inspection/Palpation: normal appearance, no masses or tenderness  · Lymph Nodes: no lymphadenopathy present  · Thyroid: gland size normal, nontender, no nodules or masses present on palpation    Chest  · Respiratory Effort: breathing unlabored  · Auscultation: normal breath sounds    Cardiovascular  · Heart:  · Auscultation: regular rate and rhythm without murmur    Breasts  · Inspection of Breasts: breasts symmetrical, no skin changes, no discharge present, nipple appearance normal, no skin retraction present  · Palpation of Breasts and Axillae: no masses present on palpation, no breast tenderness  · Axillary Lymph Nodes: no lymphadenopathy present    Gastrointestinal  · Abdominal Examination: abdomen non-tender to palpation, normal bowel sounds, no masses present  · Liver and spleen: no hepatomegaly present, spleen not palpable  · Hernias: no hernias identified    Genitourinary  · External Genitalia: normal appearance for age, no discharge present, no tenderness present, no inflammatory lesions present, no masses present, mild atrophy present  · Vagina: normal vaginal vault without central or paravaginal defects, no discharge present, no inflammatory lesions present, no masses present; atrophic  · Bladder: non-tender to palpation  · Urethra: appears normal  · Cervix: normal   · Uterus: normal size, shape and consistency  · Adnexa: no adnexal tenderness present, no adnexal masses present  · Perineum: perineum within normal limits, no evidence of trauma, no rashes or skin lesions present  · Anus: anus within normal limits, no hemorrhoids present  · Inguinal Lymph Nodes: no lymphadenopathy present  · RV:  No masses, vault fool of stool    Skin  · General Inspection: no rash, no lesions identified    Neurologic/Psychiatric  · Mental Status:  · Orientation: grossly oriented to person, place and time  · Mood and Affect: mood normal, affect appropriate    Results for orders placed or performed in visit on 05/23/17   [facility] MAMMO BI SCREENING INCL CAD    Narrative    STUDY: Bilateral digital screening mammogram    INDICATION:  Screening. COMPARISON:  2016    BREAST COMPOSITION:  There are scattered areas of fibroglandular density. FINDINGS: Bilateral digital screening mammography was performed and is  interpreted in conjunction with a computer assisted detection (CAD) system. No  suspicious masses or calcifications are identified. There has been no  significant change. Impression    IMPRESSION:  BI-RADS 1: Negative. No mammographic evidence of malignancy. RECOMMENDATIONS:  Next screening mammogram is recommended in one year. The patient will be notified of these results. Assessment & Plan:  · Routine gynecologic examination. Pap/HPV negative 5/13/16, rpt q 5yrs  · Her current medical status is satisfactory with no evidence of significant gynecologic issues. · Some vasomotor sx. Dicussed would not recommend HRT d/t her h/o stroke. Nonhormonal options include SSRI, which she is already taking. It is possible that she might get better relief of her vasomotor sx with a different SSRI (other options include Effexor, Celexa), although Zoloft has been shown to be effective. Would not necessarily expect her to have a better response to another SSRI. Can discuss with her PCP - do not want to make any changes today that might negatively impact control of her depression.   · Counseled re: diet, exercise, healthy lifestyle  · Return for yearly wellness visits  · Rec annual mammogram.  Done today in our office, nany, BIRADS-1.  · Patient verbalized understanding

## 2017-05-23 NOTE — PATIENT INSTRUCTIONS
Well Visit, Women 48 to 72: Care Instructions  Your Care Instructions  Physical exams can help you stay healthy. Your doctor has checked your overall health and may have suggested ways to take good care of yourself. He or she also may have recommended tests. At home, you can help prevent illness with healthy eating, regular exercise, and other steps. Follow-up care is a key part of your treatment and safety. Be sure to make and go to all appointments, and call your doctor if you are having problems. It's also a good idea to know your test results and keep a list of the medicines you take. How can you care for yourself at home? · Reach and stay at a healthy weight. This will lower your risk for many problems, such as obesity, diabetes, heart disease, and high blood pressure. · Get at least 30 minutes of exercise on most days of the week. Walking is a good choice. You also may want to do other activities, such as running, swimming, cycling, or playing tennis or team sports. · Do not smoke. Smoking can make health problems worse. If you need help quitting, talk to your doctor about stop-smoking programs and medicines. These can increase your chances of quitting for good. · Protect your skin from too much sun. When you're outdoors from 10 a.m. to 4 p.m., stay in the shade or cover up with clothing and a hat with a wide brim. Wear sunglasses that block UV rays. Even when it's cloudy, put broad-spectrum sunscreen (SPF 30 or higher) on any exposed skin. · See a dentist one or two times a year for checkups and to have your teeth cleaned. · Wear a seat belt in the car. · Limit alcohol to 1 drink a day. Too much alcohol can cause health problems. Follow your doctor's advice about when to have certain tests. These tests can spot problems early. · Cholesterol.  Your doctor will tell you how often to have this done based on your age, family history, or other things that can increase your risk for heart attack and stroke. · Blood pressure. Have your blood pressure checked during a routine doctor visit. Your doctor will tell you how often to check your blood pressure based on your age, your blood pressure results, and other factors. · Mammogram. Ask your doctor how often you should have a mammogram, which is an X-ray of your breasts. A mammogram can spot breast cancer before it can be felt and when it is easiest to treat. · Pap test and pelvic exam. Ask your doctor how often you should have a Pap test. You may not need to have a Pap test as often as you used to. · Vision. Have your eyes checked every year or two or as often as your doctor suggests. Some experts recommend that you have yearly exams for glaucoma and other age-related eye problems starting at age 48. · Hearing. Tell your doctor if you notice any change in your hearing. You can have tests to find out how well you hear. · Diabetes. Ask your doctor whether you should have tests for diabetes. · Colon cancer. You should begin tests for colon cancer at age 48. You may have one of several tests. Your doctor will tell you how often to have tests based on your age and risk. Risks include whether you already had a precancerous polyp removed from your colon or whether your parents, sisters and brothers, or children have had colon cancer. · Thyroid disease. Talk to your doctor about whether to have your thyroid checked as part of a regular physical exam. Women have an increased chance of a thyroid problem. · Osteoporosis. You should begin tests for bone density at age 72. If you are younger than 72, ask your doctor whether you have factors that may increase your risk for this disease. You may want to have this test before age 72. · Heart attack and stroke risk. At least every 4 to 6 years, you should have your risk for heart attack and stroke assessed.  Your doctor uses factors such as your age, blood pressure, cholesterol, and whether you smoke or have diabetes to show what your risk for a heart attack or stroke is over the next 10 years. When should you call for help? Watch closely for changes in your health, and be sure to contact your doctor if you have any problems or symptoms that concern you. Where can you learn more? Go to http://cortez-patricia.info/. Enter J889 in the search box to learn more about \"Well Visit, Women 50 to 72: Care Instructions. \"  Current as of: July 19, 2016  Content Version: 11.2  © 7263-2691 [employer]. Care instructions adapted under license by [employer] (which disclaims liability or warranty for this information). If you have questions about a medical condition or this instruction, always ask your healthcare professional. [address] any warranty or liability for your use of this information.

## 2017-06-08 ENCOUNTER — OFFICE VISIT (OUTPATIENT)
Dept: NEUROLOGY | Age: 57
End: 2017-06-08

## 2017-06-08 VITALS
DIASTOLIC BLOOD PRESSURE: 64 MMHG | WEIGHT: 116 LBS | OXYGEN SATURATION: 96 % | BODY MASS INDEX: 19.33 KG/M2 | HEART RATE: 66 BPM | SYSTOLIC BLOOD PRESSURE: 138 MMHG | HEIGHT: 65 IN

## 2017-06-08 DIAGNOSIS — F32.A DEPRESSION, UNSPECIFIED DEPRESSION TYPE: ICD-10-CM

## 2017-06-08 DIAGNOSIS — F01.50 VASCULAR DEMENTIA WITHOUT BEHAVIORAL DISTURBANCE (HCC): Primary | ICD-10-CM

## 2017-06-08 DIAGNOSIS — Z86.73 HX OF COMPLETED STROKE: ICD-10-CM

## 2017-06-08 DIAGNOSIS — G81.14 LEFT SPASTIC HEMIPARESIS (HCC): ICD-10-CM

## 2017-06-08 DIAGNOSIS — G44.221 CHRONIC TENSION-TYPE HEADACHE, INTRACTABLE: ICD-10-CM

## 2017-06-08 RX ORDER — BUTALBITAL, ACETAMINOPHEN AND CAFFEINE 50; 325; 40 MG/1; MG/1; MG/1
TABLET ORAL
Qty: 30 TAB | Refills: 2 | Status: SHIPPED | OUTPATIENT
Start: 2017-06-08 | End: 2017-12-11

## 2017-06-08 RX ORDER — SERTRALINE HYDROCHLORIDE 100 MG/1
100 TABLET, FILM COATED ORAL DAILY
Qty: 30 TAB | Refills: 5 | Status: SHIPPED | OUTPATIENT
Start: 2017-06-08 | End: 2017-12-04

## 2017-06-08 NOTE — MR AVS SNAPSHOT
Visit Information Date & Time Provider Department Dept. Phone Encounter #  
 6/8/2017 10:40 AM Neri Gerardo MD [facility] [phone] [id number] Follow-up Instructions Return in about 6 months (around 12/8/2017). Your Appointments 8/14/2017  9:00 AM  
ROUTINE CARE with Alycia Amador MD  
[address] [place] [address]) Appt Note: 4 month f/u DM  
 [address]  
[phone]  
  
   
 [address] [place] [address] [address] [address] Upcoming Health Maintenance Date Due MICROALBUMIN Q1 7/14/2017 INFLUENZA AGE 9 TO ADULT 8/1/2017 HEMOGLOBIN A1C Q6M 10/14/2017 EYE EXAM RETINAL OR DILATED Q1 12/13/2017 FOOT EXAM Q1 1/17/2018 LIPID PANEL Q1 4/14/2018 BREAST CANCER SCRN MAMMOGRAM 5/23/2018 PAP AKA CERVICAL CYTOLOGY 5/13/2021 COLONOSCOPY 11/12/2025 DTaP/Tdap/Td series (2 - Td) 12/14/2026 Allergies as of 6/8/2017  Review Complete On: 6/8/2017 By: Juliann Barthel, LPN Severity Noted Reaction Type Reactions Namenda [Memantine]  11/17/2016    Other (comments) Side effects Current Immunizations  Reviewed on 12/14/2016 No immunizations on file. Not reviewed this visit You Were Diagnosed With   
  
 Codes Comments Vascular dementia without behavioral disturbance    -  Primary ICD-10-CM: F01.50 ICD-9-CM: 290.40 Left spastic hemiparesis (HCC)     ICD-10-CM: G81.14 
ICD-9-CM: 342.10 Hx of completed stroke     ICD-10-CM: Z86.73 
ICD-9-CM: V12.54 Chronic tension-type headache, intractable     ICD-10-CM: V07.155 ICD-9-CM: 339.12 Depression, unspecified depression type     ICD-10-CM: F32.9 ICD-9-CM: 663 Vitals BP Pulse Height(growth percentile) Weight(growth percentile) SpO2 BMI  
 138/64 (BP 1 Location: Right arm, BP Patient Position: Sitting) 66 5' 5\" (1.651 m) 116 lb (52.6 kg) 96% 19.3 kg/m2 OB Status Smoking Status Postmenopausal Former Smoker BMI and BSA Data Body Mass Index Body Surface Area  
 19.3 kg/m 2 1.55 m 2 Preferred Pharmacy Pharmacy Name Phone [id number] [address] [facility] [phone] Your Updated Medication List  
  
   
This list is accurate as of: 6/8/17 11:33 AM.  Always use your most recent med list.  
  
  
  
  
 atorvastatin 10 mg tablet Commonly known as:  LIPITOR  
TAKE 1 TABLET BY MOUTH DAILY. * Blood-Glucose Meter monitoring kit Test daily or as directed * Blood-Glucose Meter Misc Commonly known as:  TRUE METRIX GLUCOSE METER Test daily. butalbital-acetaminophen-caffeine -40 mg per tablet Commonly known as:  FIORICET  
1-2 tabs at onset of moderate headache. May repeat 1 tab in 4 hours if needed. Limit to 3 days a week maximum. clopidogrel 75 mg Tab Commonly known as:  PLAVIX TAKE 1 TAB BY MOUTH DAILY. FOR STROKE PREVENTION. donepezil 10 mg tablet Commonly known as:  ARICEPT  
TAKE 1 TABLET BY MOUTH AT NIGHT  
  
 glucose blood VI test strips strip Commonly known as:  TRUE METRIX GLUCOSE TEST STRIP Test daily. metFORMIN  mg tablet Commonly known as:  GLUCOPHAGE XR  
TAKE 2 TABLETS BY MOUTH ONCE A DAY  
  
 metoprolol tartrate 25 mg tablet Commonly known as:  LOPRESSOR  
TAKE 0.5 TABS BY MOUTH TWO (2) TIMES A DAY. multivitamin tablet Commonly known as:  ONE A DAY Take 1 Tab by mouth daily. sertraline 100 mg tablet Commonly known as:  ZOLOFT Take 1 Tab by mouth daily. For stroke-related depression * ULTRA THIN LANCETS 30 gauge Misc Generic drug:  lancets USE AS DIRECTED DAILY  
  
 * lancets 28 gauge Misc Test daily. urea 40 % topical cream  
Commonly known as:  CARMOL  
APPLY TO AFFECTED AREA TWICE DAILY * Notice: This list has 4 medication(s) that are the same as other medications prescribed for you. Read the directions carefully, and ask your doctor or other care provider to review them with you. Prescriptions Printed Refills  
 butalbital-acetaminophen-caffeine (FIORICET) -40 mg per tablet 2 Si-2 tabs at onset of moderate headache. May repeat 1 tab in 4 hours if needed. Limit to 3 days a week maximum. Class: Print Prescriptions Sent to Pharmacy Refills  
 sertraline (ZOLOFT) 100 mg tablet 5 Sig: Take 1 Tab by mouth daily. For stroke-related depression Class: Normal  
 [id number] [place], [address] [facility] #: [phone] Route: Oral  
  
Follow-up Instructions Return in about 6 months (around 2017). Introducing [facility] & HEALTH SERVICES! Dear Tiesha Badillo: Thank you for requesting a NOWBOX account. Our records indicate that you already have an active NOWBOX account. You can access your account anytime at [email or web address] [email or web address]/RSB SPINE Did you know that you can access your hospital and ER discharge instructions at any time in NOWBOX? You can also review all of your test results from your hospital stay or ER visit. Additional Information If you have questions, please visit the Frequently Asked Questions section of the NOWBOX website at [email or web address] [employer]/[email or web address]/. Remember, NOWBOX is NOT to be used for urgent needs. For medical emergencies, dial 911. Now available from your iPhone and Android! Please provide this summary of care documentation to your next provider. Your primary care clinician is listed as [place]. If you have any questions after today's visit, please call [phone].

## 2017-06-08 NOTE — PROGRESS NOTES
Interval HPI:   This is a 62 y.o. 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  Started Zoloft 50 mg qday at last visit due to depressive symptoms. Pt says she thinks she's a little better from a mood standpoint, but daughter says she can't tell. Pt is getting out of the house more often though (going to [employer]). No worsening of memory, both she and daughter say that's about the same. She is having headache more often, near daily. Describes mild headache across the forehead. Daughter gives her the Fioricet a few days a week but not for every headache. Reviewed most recent Lipid Panel; LDL looks great, 37. Continues taking  Plavix 75 mg/ day, Lipitor 10 mg QHS, BP and DM meds    =================  Brief Hx: Right MCA territory infarction in June 2015, treated at [facility], resultant mild left hemiparesis/ spastic gait. CTA H+N: no aneurysm, mild/ minimal ICA stenosis, right MCA narrowing consistent with prior infarction. Seen by Neuropsychology/ Dr. Marleny Oshea. Dx with mild-moderate dementia (vascular and/or alzheimer's), mild depression. Brief ROS: as above or otherwise negative  There have been no significant changes in PMHx, PSHx, SHx except as noted above. Allergies   Allergen Reactions    Namenda [Memantine] Other (comments)     Side effects     Current Outpatient Prescriptions   Medication Sig Dispense Refill    sertraline (ZOLOFT) 100 mg tablet Take 1 Tab by mouth daily. For stroke-related depression 30 Tab 5    butalbital-acetaminophen-caffeine (FIORICET) -40 mg per tablet 1-2 tabs at onset of moderate headache. May repeat 1 tab in 4 hours if needed. Limit to 3 days a week maximum.  30 Tab 2    metFORMIN ER (GLUCOPHAGE XR) 750 mg tablet TAKE 2 TABLETS BY MOUTH ONCE A DAY  5    glucose blood VI test strips (TRUE METRIX GLUCOSE TEST STRIP) strip Test daily. 100 Strip 6    lancets 28 gauge misc Test daily. 100 Lancet 6    ULTRA THIN LANCETS 30 gauge misc USE AS DIRECTED DAILY  6    urea (CARMOL) 40 % topical cream APPLY TO AFFECTED AREA TWICE DAILY  1    donepezil (ARICEPT) 10 mg tablet TAKE 1 TABLET BY MOUTH AT NIGHT 30 Tab 2    Blood-Glucose Meter (TRUE METRIX GLUCOSE METER) misc Test daily. 1 Each 0    clopidogrel (PLAVIX) 75 mg tablet TAKE 1 TAB BY MOUTH DAILY. FOR STROKE PREVENTION. 90 Tab 3    metoprolol tartrate (LOPRESSOR) 25 mg tablet TAKE 0.5 TABS BY MOUTH TWO (2) TIMES A DAY. 90 Tab 3    atorvastatin (LIPITOR) 10 mg tablet TAKE 1 TABLET BY MOUTH DAILY. 90 Tab 3    Blood-Glucose Meter monitoring kit Test daily or as directed 1 Kit 0    multivitamin (ONE A DAY) tablet Take 1 Tab by mouth daily. Physical Exam  Blood pressure 138/64, pulse 66, height 5' 5\" (1.651 m), weight 52.6 kg (116 lb), SpO2 96 %. No acute distress, resting, affect is less flat than last visit, and seems to talk a little more   Neck: no stiffness  Skin: no rashes    Focused Neurological Exam     Mental status: Alert and oriented to person, place situation. Language: normal fluency and comprehension; no dysarthria. CNs:   Visual fields grossly normal  Extraocular movements intact, no nystagmus  Face appears symmetric and facial strength normal.    Hearing is intact to casual conversation. Sensory: not tested today    Motor:  Left arm and le/ 5  Right arm and le/ 5  Left side spasticity    Reflexes: increased on left compared to right  Gait: spastic left hemparesis    Impression      ICD-10-CM ICD-9-CM    1. Vascular dementia without behavioral disturbance F01.50 290.40    2. Left spastic hemiparesis (HCC) G81.14 342.10    3. Hx of completed stroke Z86.73 V12.54    4.  Chronic tension-type headache, intractable G44.221 339.12 sertraline (ZOLOFT) 100 mg tablet      butalbital-acetaminophen-caffeine (FIORICET) -40 mg per tablet   5.  Depression, unspecified depression type F32.9 311 sertraline (ZOLOFT) 100 mg tablet        Mixed mild-moderate vascular-alzheimer's dementia by cognitive testing  Chronic tension headache  Depression    Continue Aricept 10 mg QHS  (didn't tolerate Namenda)  Increased Zoloft to 100 mg QAM for depression and tension headaches  Rx'd Fioricet to use prn moderate headache (up to 3 days a week if needed)    Follow up in 6 months

## 2017-07-23 RX ORDER — ATORVASTATIN CALCIUM 10 MG/1
TABLET, FILM COATED ORAL
Qty: 90 TAB | Refills: 2 | Status: SHIPPED | OUTPATIENT
Start: 2017-07-23 | End: 2017-10-24 | Stop reason: ALTCHOICE

## 2017-07-23 RX ORDER — CLOPIDOGREL BISULFATE 75 MG/1
TABLET ORAL
Qty: 90 TAB | Refills: 3 | Status: SHIPPED | OUTPATIENT
Start: 2017-07-23 | End: 2018-01-03 | Stop reason: SDUPTHER

## 2017-09-10 RX ORDER — METOPROLOL TARTRATE 25 MG/1
TABLET, FILM COATED ORAL
Qty: 90 TAB | Refills: 2 | Status: SHIPPED | OUTPATIENT
Start: 2017-09-10 | End: 2017-12-04

## 2017-09-12 ENCOUNTER — OFFICE VISIT (OUTPATIENT)
Dept: FAMILY MEDICINE CLINIC | Age: 57
End: 2017-09-12

## 2017-09-12 VITALS
WEIGHT: 113.4 LBS | DIASTOLIC BLOOD PRESSURE: 59 MMHG | RESPIRATION RATE: 16 BRPM | SYSTOLIC BLOOD PRESSURE: 156 MMHG | BODY MASS INDEX: 18.89 KG/M2 | HEIGHT: 65 IN | TEMPERATURE: 97.9 F | HEART RATE: 58 BPM

## 2017-09-12 DIAGNOSIS — I10 ESSENTIAL HYPERTENSION: ICD-10-CM

## 2017-09-12 DIAGNOSIS — R63.4 WEIGHT LOSS: ICD-10-CM

## 2017-09-12 DIAGNOSIS — E11.9 TYPE 2 DIABETES MELLITUS WITHOUT COMPLICATION, WITHOUT LONG-TERM CURRENT USE OF INSULIN (HCC): Primary | ICD-10-CM

## 2017-09-12 NOTE — PROGRESS NOTES
HISTORY OF PRESENT ILLNESS  Kevin Barraza is a 62 y.o. female. Diabetes   The history is provided by the patient and relative (her FBS have been mostly in the 90's. She is not eating well. Also, she hasn't taken her blood pressure medication yet today. Her daughter is with her today. ). This is a chronic problem. The current episode started more than 1 week ago. The problem occurs constantly. The problem has been rapidly improving. Associated symptoms include headaches. Pertinent negatives include no chest pain, no abdominal pain and no shortness of breath. Nothing aggravates the symptoms. Relieved by: lifestyle modifications. Treatments tried: lifestyle modifications. The treatment provided significant relief. Review of Systems   Constitutional: Positive for weight loss. Eyes: Negative for blurred vision. Respiratory: Negative for shortness of breath. Cardiovascular: Negative for chest pain and leg swelling. Gastrointestinal: Negative for abdominal pain. Genitourinary:        Polyuria   Neurological: Positive for headaches. Negative for dizziness, sensory change, speech change and focal weakness. Endo/Heme/Allergies: Negative for polydipsia. Lab Results   Component Value Date/Time    Hemoglobin A1c 6.6 04/14/2017 09:32 AM         Visit Vitals    /59 (BP 1 Location: Left arm, BP Patient Position: Sitting)    Pulse (!) 58    Temp 97.9 °F (36.6 °C) (Oral)    Resp 16    Ht 5' 5\" (1.651 m)    Wt 113 lb 6.4 oz (51.4 kg)    BMI 18.87 kg/m2     Physical Exam   Constitutional: She is oriented to person, place, and time. She appears well-developed and well-nourished. No distress. Cardiovascular: Normal rate, regular rhythm and normal heart sounds. Exam reveals no gallop and no friction rub. No murmur heard. Pulmonary/Chest: Effort normal and breath sounds normal. No respiratory distress. She has no wheezes. She has no rales. Musculoskeletal: She exhibits no edema. Neurological: She is alert and oriented to person, place, and time. Skin: Skin is warm and dry. She is not diaphoretic. Nursing note and vitals reviewed. ASSESSMENT and PLAN    ICD-10-CM ICD-9-CM    1. Type 2 diabetes mellitus without complication, without long-term current use of insulin ([facility]) B66.3 668.28 METABOLIC PANEL, COMPREHENSIVE      HEMOGLOBIN A1C WITH EAG   2. Essential hypertension A70 205.9 METABOLIC PANEL, COMPREHENSIVE   3. Weight loss R63.4 783.21 CBC WITH AUTOMATED DIFF      TSH 3RD GENERATION        Diabetes likely over controlled  Blood pressure elevated, has not taken her medications yet  Unplanned weight loss  Labs per orders. Follow-up Disposition:  Return in about 6 weeks (around 10/24/2017) for weight loss, blood pressure. Reviewed plan of care. Patient has provided input and agrees with goals.

## 2017-09-12 NOTE — PROGRESS NOTES
Chief Complaint   Patient presents with    Diabetes     follow up     1. Have you been to the ER, urgent care clinic since your last visit? No.  Hospitalized since your last visit? No    2. Have you seen or consulted any other health care providers outside of the [address] [place] [address] since your last visit? Include any pap smears or colon screening.  No.

## 2017-09-12 NOTE — MR AVS SNAPSHOT
Visit Information Date & Time Provider Department Dept. Phone Encounter #  
 9/12/2017  9:15 AM Christopher Mejiakamron 34 [id number] Follow-up Instructions Return in about 6 weeks (around 10/24/2017) for weight loss, blood pressure. Your Appointments 12/11/2017  8:40 AM  
Follow Up with Marylu Gates MD  
[facility]) Appt Note: f/u $0cp sesb 8/14/17 [place] [address] [place] [address] [phone]  
  
   
 [place] [address] [id number] [address] Upcoming Health Maintenance Date Due MICROALBUMIN Q1 7/14/2017 INFLUENZA AGE 9 TO ADULT 8/1/2017 HEMOGLOBIN A1C Q6M 10/14/2017 EYE EXAM RETINAL OR DILATED Q1 12/13/2017 FOOT EXAM Q1 1/17/2018 LIPID PANEL Q1 4/14/2018 BREAST CANCER SCRN MAMMOGRAM 5/23/2018 PAP AKA CERVICAL CYTOLOGY 5/13/2021 COLONOSCOPY 11/12/2025 DTaP/Tdap/Td series (2 - Td) 12/14/2026 Allergies as of 9/12/2017  Review Complete On: 9/12/2017 By: Jana Pina MD  
  
 Severity Noted Reaction Type Reactions Namenda [Memantine]  11/17/2016    Other (comments) Side effects Current Immunizations  Reviewed on 12/14/2016 No immunizations on file. Not reviewed this visit You Were Diagnosed With   
  
 Codes Comments Type 2 diabetes mellitus without complication, without long-term current use of insulin (HCC)    -  Primary ICD-10-CM: E11.9 ICD-9-CM: 250.00 Essential hypertension     ICD-10-CM: I10 
ICD-9-CM: 401.9 Weight loss     ICD-10-CM: R63.4 ICD-9-CM: 783.21 Vitals BP Pulse Temp Resp Height(growth percentile) Weight(growth percentile) 156/59 (BP 1 Location: Left arm, BP Patient Position: Sitting) (!) 58 97.9 °F (36.6 °C) (Oral) 16 5' 5\" (1.651 m) 113 lb 6.4 oz (51.4 kg) BMI OB Status Smoking Status 18.87 kg/m2 Postmenopausal Former Smoker BMI and BSA Data Body Mass Index Body Surface Area  
 18.87 kg/m 2 1.54 m 2 Preferred Pharmacy [facility] Name Phone [id number] [address] [facility] [place] [phone] Your Updated Medication List  
  
   
This list is accurate as of: 9/12/17 10:14 AM.  Always use your most recent med list.  
  
  
  
  
 atorvastatin 10 mg tablet Commonly known as:  LIPITOR  
TAKE 1 TABLET BY MOUTH DAILY. * Blood-Glucose Meter monitoring kit Test daily or as directed * Blood-Glucose Meter Misc Commonly known as:  TRUE METRIX GLUCOSE METER Test daily. butalbital-acetaminophen-caffeine -40 mg per tablet Commonly known as:  FIORICET  
1-2 tabs at onset of moderate headache. May repeat 1 tab in 4 hours if needed. Limit to 3 days a week maximum. clopidogrel 75 mg Tab Commonly known as:  PLAVIX TAKE 1 TAB BY MOUTH DAILY. FOR STROKE PREVENTION. donepezil 10 mg tablet Commonly known as:  ARICEPT Take 1 Tab by mouth nightly for 90 days. For vascular dementia  
  
 glucose blood VI test strips strip Commonly known as:  TRUE METRIX GLUCOSE TEST STRIP Test daily. metoprolol tartrate 25 mg tablet Commonly known as:  LOPRESSOR  
TAKE 0.5 TABS BY MOUTH TWO (2) TIMES A DAY. multivitamin tablet Commonly known as:  ONE A DAY Take 1 Tab by mouth daily. sertraline 100 mg tablet Commonly known as:  ZOLOFT Take 1 Tab by mouth daily. For stroke-related depression * ULTRA THIN LANCETS 30 gauge Misc Generic drug:  lancets USE AS DIRECTED DAILY  
  
 * lancets 28 gauge Misc Test daily. urea 40 % topical cream  
Commonly known as:  CARMOL  
APPLY TO AFFECTED AREA TWICE DAILY * Notice: This list has 4 medication(s) that are the same as other medications prescribed for you.  Read the directions carefully, and ask your doctor or other care provider to review them with you. We Performed the Following CBC WITH AUTOMATED DIFF [94313 CPT(R)] HEMOGLOBIN A1C WITH EAG [09917 CPT(R)] METABOLIC PANEL, COMPREHENSIVE [51698 CPT(R)] TSH 3RD GENERATION [65947 CPT(R)] Follow-up Instructions Return in about 6 weeks (around 10/24/2017) for weight loss, blood pressure. Introducing [facility] & HEALTH SERVICES! Dear Nadir Goins: Thank you for requesting a [employer] account. Our records indicate that you already have an active [employer] account. You can access your account anytime at [email or web address] Did you know that you can access your hospital and ER discharge instructions at any time in [employer]? You can also review all of your test results from your hospital stay or ER visit. Additional Information If you have questions, please visit the Frequently Asked Questions section of the [employer] website at [email or web address] Remember, [employer] is NOT to be used for urgent needs. For medical emergencies, dial 911. Now available from your iPhone and Android! Please provide this summary of care documentation to your next provider. Your primary care clinician is listed as [place]. If you have any questions after today's visit, please call [phone].

## 2017-09-13 LAB
ALBUMIN SERPL-MCNC: 3.8 G/DL (ref 3.5–5.5)
ALBUMIN/GLOB SERPL: 1.4 {RATIO} (ref 1.2–2.2)
ALP SERPL-CCNC: 98 IU/L (ref 39–117)
ALT SERPL-CCNC: 37 IU/L (ref 0–32)
AST SERPL-CCNC: 27 IU/L (ref 0–40)
BASOPHILS # BLD AUTO: 0 X10E3/UL (ref 0–0.2)
BASOPHILS NFR BLD AUTO: 1 %
BILIRUB SERPL-MCNC: <0.2 MG/DL (ref 0–1.2)
BUN SERPL-MCNC: 17 MG/DL (ref 6–24)
BUN/CREAT SERPL: 23 (ref 9–23)
CALCIUM SERPL-MCNC: 9 MG/DL (ref 8.7–10.2)
CHLORIDE SERPL-SCNC: 105 MMOL/L (ref 96–106)
CO2 SERPL-SCNC: 27 MMOL/L (ref 18–29)
CREAT SERPL-MCNC: 0.75 MG/DL (ref 0.57–1)
EOSINOPHIL # BLD AUTO: 0.1 X10E3/UL (ref 0–0.4)
EOSINOPHIL NFR BLD AUTO: 1 %
ERYTHROCYTE [DISTWIDTH] IN BLOOD BY AUTOMATED COUNT: 14.7 % (ref 12.3–15.4)
EST. AVERAGE GLUCOSE BLD GHB EST-MCNC: 143 MG/DL
GLOBULIN SER CALC-MCNC: 2.7 G/DL (ref 1.5–4.5)
GLUCOSE SERPL-MCNC: 136 MG/DL (ref 65–99)
HBA1C MFR BLD: 6.6 % (ref 4.8–5.6)
HCT VFR BLD AUTO: 33.1 % (ref 34–46.6)
HGB BLD-MCNC: 10.7 G/DL (ref 11.1–15.9)
IMM GRANULOCYTES # BLD: 0 X10E3/UL (ref 0–0.1)
IMM GRANULOCYTES NFR BLD: 0 %
LYMPHOCYTES # BLD AUTO: 3.8 X10E3/UL (ref 0.7–3.1)
LYMPHOCYTES NFR BLD AUTO: 61 %
MCH RBC QN AUTO: 26.2 PG (ref 26.6–33)
MCHC RBC AUTO-ENTMCNC: 32.3 G/DL (ref 31.5–35.7)
MCV RBC AUTO: 81 FL (ref 79–97)
MONOCYTES # BLD AUTO: 0.3 X10E3/UL (ref 0.1–0.9)
MONOCYTES NFR BLD AUTO: 5 %
NEUTROPHILS # BLD AUTO: 2 X10E3/UL (ref 1.4–7)
NEUTROPHILS NFR BLD AUTO: 32 %
PLATELET # BLD AUTO: 293 X10E3/UL (ref 150–379)
POTASSIUM SERPL-SCNC: 3.8 MMOL/L (ref 3.5–5.2)
PROT SERPL-MCNC: 6.5 G/DL (ref 6–8.5)
RBC # BLD AUTO: 4.09 X10E6/UL (ref 3.77–5.28)
SODIUM SERPL-SCNC: 145 MMOL/L (ref 134–144)
TSH SERPL DL<=0.005 MIU/L-ACNC: 0.65 UIU/ML (ref 0.45–4.5)
WBC # BLD AUTO: 6.2 X10E3/UL (ref 3.4–10.8)

## 2017-09-17 ENCOUNTER — TELEPHONE (OUTPATIENT)
Dept: FAMILY MEDICINE CLINIC | Age: 57
End: 2017-09-17

## 2017-09-17 DIAGNOSIS — R63.4 WEIGHT LOSS: Primary | ICD-10-CM

## 2017-09-17 DIAGNOSIS — D64.9 ANEMIA, UNSPECIFIED TYPE: ICD-10-CM

## 2017-09-18 NOTE — TELEPHONE ENCOUNTER
Please call patient and her daughter and let them know she is anemic. She needs to see Gastroenterology, and I have printed a referral request.  Also, please let them know it is extremely important that they follow up with me on her weight loss next month, as we discussed.

## 2017-10-24 ENCOUNTER — OFFICE VISIT (OUTPATIENT)
Dept: FAMILY MEDICINE CLINIC | Age: 57
End: 2017-10-24

## 2017-10-24 VITALS
TEMPERATURE: 97.6 F | RESPIRATION RATE: 20 BRPM | HEIGHT: 65 IN | HEART RATE: 61 BPM | DIASTOLIC BLOOD PRESSURE: 65 MMHG | WEIGHT: 114 LBS | BODY MASS INDEX: 18.99 KG/M2 | SYSTOLIC BLOOD PRESSURE: 135 MMHG

## 2017-10-24 DIAGNOSIS — I10 ESSENTIAL HYPERTENSION: Primary | ICD-10-CM

## 2017-10-24 DIAGNOSIS — R63.4 UNINTENDED WEIGHT LOSS: ICD-10-CM

## 2017-10-24 DIAGNOSIS — F01.50 VASCULAR DEMENTIA WITHOUT BEHAVIORAL DISTURBANCE (HCC): ICD-10-CM

## 2017-10-24 DIAGNOSIS — G81.14 LEFT SPASTIC HEMIPARESIS (HCC): ICD-10-CM

## 2017-10-24 DIAGNOSIS — K02.9 DENTAL CARIES: ICD-10-CM

## 2017-10-24 PROBLEM — J45.40 MODERATE PERSISTENT ASTHMA WITHOUT COMPLICATION: Status: ACTIVE | Noted: 2017-10-24

## 2017-10-24 PROBLEM — M1A.09X0 CHRONIC GOUT OF MULTIPLE SITES: Status: ACTIVE | Noted: 2017-10-24

## 2017-10-24 PROBLEM — E78.2 MIXED HYPERLIPIDEMIA: Status: ACTIVE | Noted: 2017-10-24

## 2017-10-24 PROBLEM — N18.30 CKD (CHRONIC KIDNEY DISEASE) STAGE 3, GFR 30-59 ML/MIN (HCC): Status: ACTIVE | Noted: 2017-10-24

## 2017-10-24 RX ORDER — EZETIMIBE 10 MG/1
TABLET ORAL DAILY
COMMUNITY
End: 2017-10-24 | Stop reason: CLARIF

## 2017-10-24 RX ORDER — PRAVASTATIN SODIUM 20 MG/1
20 TABLET ORAL
Qty: 30 TAB | Refills: 3 | Status: SHIPPED | OUTPATIENT
Start: 2017-10-24 | End: 2017-10-24

## 2017-10-24 NOTE — PROGRESS NOTES
Chief Complaint   Patient presents with    Weight Management     follow up     Hypertension     follow up      Health Maintenance   Topic Date Due    MICROALBUMIN Q1  07/14/2017    EYE EXAM RETINAL OR DILATED Q1  12/13/2017    FOOT EXAM Q1  01/17/2018    HEMOGLOBIN A1C Q6M  03/12/2018    LIPID PANEL Q1  04/14/2018    BREAST CANCER SCRN MAMMOGRAM  05/23/2018    PAP AKA CERVICAL CYTOLOGY  05/13/2021    COLONOSCOPY  11/12/2025    DTaP/Tdap/Td series (2 - Td) 12/14/2026    Hepatitis C Screening  Completed    Pneumococcal 19-64 Medium Risk  Addressed    INFLUENZA AGE 9 TO ADULT  Addressed

## 2017-10-24 NOTE — MR AVS SNAPSHOT
Visit Information Date & Time Provider Department Dept. Phone Encounter #  
 10/24/2017  9:30 AM Luciana Cespedes Tiffany 34 [id number] Follow-up Instructions Return in about 3 months (around 1/24/2018) for diabetes, check weight. Your Appointments 12/11/2017  8:40 AM  
Follow Up with Trey Espinosa MD  
[facility]) Appt Note: f/u $0cp sesb 8/14/17 [place] [address] [facility] [address] [facility] [address] [phone]  
  
   
 [place] [address] [id number] [address] Upcoming Health Maintenance Date Due MICROALBUMIN Q1 7/14/2017 EYE EXAM RETINAL OR DILATED Q1 12/13/2017 FOOT EXAM Q1 1/17/2018 HEMOGLOBIN A1C Q6M 3/12/2018 LIPID PANEL Q1 4/14/2018 BREAST CANCER SCRN MAMMOGRAM 5/23/2018 PAP AKA CERVICAL CYTOLOGY 5/13/2021 COLONOSCOPY 11/12/2025 DTaP/Tdap/Td series (2 - Td) 12/14/2026 Allergies as of 10/24/2017  Review Complete On: 10/24/2017 By: Luciana Cespedes MD  
  
 Severity Noted Reaction Type Reactions Namenda [Memantine]  11/17/2016    Other (comments) Side effects Current Immunizations  Reviewed on 12/14/2016 No immunizations on file. Not reviewed this visit You Were Diagnosed With   
  
 Codes Comments Essential hypertension    -  Primary ICD-10-CM: I10 
ICD-9-CM: 401.9 Left spastic hemiparesis (HCC)     ICD-10-CM: G81.14 
ICD-9-CM: 342.10 Vascular dementia without behavioral disturbance     ICD-10-CM: F01.50 ICD-9-CM: 290.40 Unintended weight loss     ICD-10-CM: R63.4 ICD-9-CM: 783.21 Dental caries     ICD-10-CM: K02.9 ICD-9-CM: 521.00 Vitals BP Pulse Temp Resp Height(growth percentile) Weight(growth percentile) 135/65 61 97.6 °F (36.4 °C) (Oral) 20 5' 5\" (1.651 m) 114 lb (51.7 kg) BMI OB Status Smoking Status 18.97 kg/m2 Postmenopausal Former Smoker BMI and BSA Data Body Mass Index Body Surface Area  
 18.97 kg/m 2 1.54 m 2 Preferred Pharmacy [facility]acy Name Phone [id number] [address] [facility] [phone] Your Updated Medication List  
  
   
This list is accurate as of: 10/24/17 10:53 AM.  Always use your most recent med list.  
  
  
  
  
 * Blood-Glucose Meter monitoring kit Test daily or as directed * Blood-Glucose Meter Misc Commonly known as:  TRUE METRIX GLUCOSE METER Test daily. butalbital-acetaminophen-caffeine -40 mg per tablet Commonly known as:  FIORICET  
1-2 tabs at onset of moderate headache. May repeat 1 tab in 4 hours if needed. Limit to 3 days a week maximum. clopidogrel 75 mg Tab Commonly known as:  PLAVIX TAKE 1 TAB BY MOUTH DAILY. FOR STROKE PREVENTION. glucose blood VI test strips strip Commonly known as:  TRUE METRIX GLUCOSE TEST STRIP Test daily. metoprolol tartrate 25 mg tablet Commonly known as:  LOPRESSOR  
TAKE 0.5 TABS BY MOUTH TWO (2) TIMES A DAY. multivitamin tablet Commonly known as:  ONE A DAY Take 1 Tab by mouth daily. sertraline 100 mg tablet Commonly known as:  ZOLOFT Take 1 Tab by mouth daily. For stroke-related depression * ULTRA THIN LANCETS 30 gauge Misc Generic drug:  lancets USE AS DIRECTED DAILY  
  
 * lancets 28 gauge Misc Test daily. urea 40 % topical cream  
Commonly known as:  CARMOL  
APPLY TO AFFECTED AREA TWICE DAILY * Notice: This list has 4 medication(s) that are the same as other medications prescribed for you. Read the directions carefully, and ask your doctor or other care provider to review them with you. Follow-up Instructions Return in about 3 months (around 1/24/2018) for diabetes, check weight. Introducing [facility] & HEALTH SERVICES! Dear Sesar Johnson: Thank you for requesting a [employer] account. Our records indicate that you already have an active [employer] account. You can access your account anytime at [email or web address] Did you know that you can access your hospital and ER discharge instructions at any time in [employer]? You can also review all of your test results from your hospital stay or ER visit. Additional Information If you have questions, please visit the Frequently Asked Questions section of the [employer] website at [email or web address] Remember, [employer] is NOT to be used for urgent needs. For medical emergencies, dial 911. Now available from your iPhone and Android! Please provide this summary of care documentation to your next provider. Your primary care clinician is listed as [place]. If you have any questions after today's visit, please call [phone].

## 2017-10-24 NOTE — PROGRESS NOTES
HISTORY OF PRESENT ILLNESS  Brandan Bui is a 62 y.o. female. Weight Management   The history is provided by the patient (she has had unplanned weight loss and is here for a weight check). Pertinent negatives include no chest pain, no abdominal pain, no headaches and no shortness of breath. Hypertension   The history is provided by the patient. This is a chronic problem. The current episode started more than 1 week ago. The problem occurs constantly. The problem has been gradually improving. Pertinent negatives include no chest pain, no abdominal pain, no headaches and no shortness of breath. Nothing aggravates the symptoms. The symptoms are relieved by medications. Treatments tried: metoprolol. The treatment provided significant relief. Other   The history is provided by the patient (she brings in labs from her nephrologist.  Her LDL was 137, TRGs 201, uric acid 8.1 and her GFR 47). Pertinent negatives include no chest pain, no abdominal pain, no headaches and no shortness of breath. Review of Systems   Constitutional: Positive for weight gain. Negative for weight loss. No weight gain   Eyes: Negative for blurred vision. Respiratory: Negative for shortness of breath. Cardiovascular: Negative for chest pain and leg swelling. Gastrointestinal: Negative for abdominal pain. Neurological: Negative for dizziness, sensory change, speech change, focal weakness and headaches. Visit Vitals    /65    Pulse 61    Temp 97.6 °F (36.4 °C) (Oral)    Resp 20    Ht 5' 5\" (1.651 m)    Wt 114 lb (51.7 kg)    BMI 18.97 kg/m2     BP Readings from Last 3 Encounters:   10/24/17 135/65   09/12/17 156/59   06/08/17 138/64     Physical Exam   Constitutional: She is oriented to person, place, and time. She appears well-developed and well-nourished. No distress. Cardiovascular: Normal rate, regular rhythm and normal heart sounds. Exam reveals no gallop and no friction rub.     No murmur heard.  Pulmonary/Chest: Effort normal and breath sounds normal. No respiratory distress. She has no wheezes. She has no rales. Musculoskeletal: She exhibits no edema. Neurological: She is alert and oriented to person, place, and time. Skin: Skin is warm and dry. She is not diaphoretic. Nursing note and vitals reviewed.       ASSESSMENT and PLAN  {ASSESSMENT/PLAN:[id number]}

## 2017-10-24 NOTE — PROGRESS NOTES
HISTORY OF PRESENT ILLNESS  Oni Nation is a 62 y.o. female. Weight Management   The history is provided by the patient, relative and medical records (she has had unplanned weight loss and is here for follow up). Associated symptoms include headaches. Pertinent negatives include no chest pain, no abdominal pain and no shortness of breath. Hypertension   The history is provided by the patient and relative. This is a chronic problem. The current episode started more than 1 week ago. The problem occurs constantly. The problem has been gradually improving. Associated symptoms include headaches. Pertinent negatives include no chest pain, no abdominal pain and no shortness of breath. Associated symptoms comments: She has a dental abscess that is causing a headache. Nothing aggravates the symptoms. The symptoms are relieved by medications. Review of Systems   Constitutional: Positive for weight gain. Negative for weight loss. No weight gain   Eyes: Negative for blurred vision. Respiratory: Negative for shortness of breath. Cardiovascular: Negative for chest pain and leg swelling. Gastrointestinal: Negative for abdominal pain. Neurological: Positive for headaches. Negative for dizziness. No new stroke symptoms       Visit Vitals    /65    Pulse 61    Temp 97.6 °F (36.4 °C) (Oral)    Resp 20    Ht 5' 5\" (1.651 m)    Wt 114 lb (51.7 kg)    BMI 18.97 kg/m2     Physical Exam   Constitutional: She is oriented to person, place, and time. She appears well-developed and well-nourished. No distress. HENT:   Mouth/Throat:       Cardiovascular: Normal rate, regular rhythm and normal heart sounds. Exam reveals no gallop and no friction rub. No murmur heard. Pulmonary/Chest: Effort normal and breath sounds normal. No respiratory distress. She has no wheezes. She has no rales. Musculoskeletal: She exhibits no edema.    Neurological: She is alert and oriented to person, place, and time.   Left sided paralysis   Skin: Skin is warm and dry. She is not diaphoretic. Nursing note and vitals reviewed. ASSESSMENT and PLAN    ICD-10-CM ICD-9-CM    1. Essential hypertension I10 401.9    2. Left spastic hemiparesis (HCC) G81.14 342.10    3. Vascular dementia without behavioral disturbance F01.50 290.40    4. Unintended weight loss R63.4 783.21    5. Dental caries K02.9 521.00         Blood pressure controlled  Stroke related hemiparesis  Dementia, still able to obtain a reasonable history  Weight stable  Severe dental caries  Continue current plans. Dentistry referral    Follow-up Disposition:  Return in about 3 months (around 1/24/2018) for diabetes, check weight. Reviewed plan of care. Patient has provided input and agrees with goals.

## 2017-11-02 ENCOUNTER — TELEPHONE (OUTPATIENT)
Dept: FAMILY MEDICINE CLINIC | Age: 57
End: 2017-11-02

## 2017-11-02 NOTE — TELEPHONE ENCOUNTER
Called [facility] at [phone] & left message for pharmacist to cancel Pravastatin 20 mg tabs order, which was accidentally sent electronically by Dr. Jane Singh to patient's pharmacy.

## 2017-11-02 NOTE — TELEPHONE ENCOUNTER
----- Message from Hattie Councilman, MD sent at 10/24/2017 10:40 AM EDT -----  I accidentally sent a prescription for pravastatin to her pharmacy. Please cancel this.

## 2017-11-29 ENCOUNTER — TELEPHONE (OUTPATIENT)
Dept: FAMILY MEDICINE CLINIC | Age: 57
End: 2017-11-29

## 2017-11-29 NOTE — TELEPHONE ENCOUNTER
Pt's daughter called stating pt has new insurance through medicaid that becomes effective 12/1/17 and pt needs referral from Dr. Andreia Lou submitted to [facility] for continued home health by nurse under pt's [facility] waiver due to history of stroke. ID number given is [id number], group number [id number], with provider services phone number [phone].  Kayla

## 2017-12-04 ENCOUNTER — APPOINTMENT (OUTPATIENT)
Dept: GENERAL RADIOLOGY | Age: 57
DRG: 392 | End: 2017-12-04
Attending: EMERGENCY MEDICINE
Payer: MEDICARE

## 2017-12-04 ENCOUNTER — TELEPHONE (OUTPATIENT)
Dept: FAMILY MEDICINE CLINIC | Age: 57
End: 2017-12-04

## 2017-12-04 ENCOUNTER — HOSPITAL ENCOUNTER (INPATIENT)
Age: 57
LOS: 1 days | Discharge: HOME | DRG: 392 | End: 2017-12-05
Attending: EMERGENCY MEDICINE | Admitting: FAMILY MEDICINE
Payer: MEDICARE

## 2017-12-04 DIAGNOSIS — R07.9 CHEST PAIN, UNSPECIFIED TYPE: Primary | ICD-10-CM

## 2017-12-04 DIAGNOSIS — R05.9 COUGH: ICD-10-CM

## 2017-12-04 DIAGNOSIS — D72.829 LEUKOCYTOSIS, UNSPECIFIED TYPE: ICD-10-CM

## 2017-12-04 LAB
ANION GAP SERPL CALC-SCNC: 9 MMOL/L (ref 5–15)
APPEARANCE UR: CLEAR
ATRIAL RATE: 62 BPM
BACTERIA URNS QL MICRO: NEGATIVE /HPF
BASOPHILS # BLD: 0 K/UL (ref 0–0.1)
BASOPHILS NFR BLD: 0 % (ref 0–1)
BILIRUB UR QL: NEGATIVE
BNP SERPL-MCNC: 635 PG/ML (ref 0–125)
BUN SERPL-MCNC: 11 MG/DL (ref 6–20)
BUN/CREAT SERPL: 12 (ref 12–20)
CALCIUM SERPL-MCNC: 8.8 MG/DL (ref 8.5–10.1)
CALCULATED P AXIS, ECG09: 80 DEGREES
CALCULATED R AXIS, ECG10: -6 DEGREES
CALCULATED T AXIS, ECG11: 29 DEGREES
CHLORIDE SERPL-SCNC: 104 MMOL/L (ref 97–108)
CK SERPL-CCNC: 72 U/L (ref 26–192)
CO2 SERPL-SCNC: 27 MMOL/L (ref 21–32)
COLOR UR: ABNORMAL
CREAT SERPL-MCNC: 0.9 MG/DL (ref 0.55–1.02)
DIAGNOSIS, 93000: NORMAL
EOSINOPHIL # BLD: 0.1 K/UL (ref 0–0.4)
EOSINOPHIL NFR BLD: 0 % (ref 0–7)
EPITH CASTS URNS QL MICRO: ABNORMAL /LPF
ERYTHROCYTE [DISTWIDTH] IN BLOOD BY AUTOMATED COUNT: 13.1 % (ref 11.5–14.5)
GLUCOSE SERPL-MCNC: 107 MG/DL (ref 65–100)
GLUCOSE UR STRIP.AUTO-MCNC: 100 MG/DL
HCT VFR BLD AUTO: 33.4 % (ref 35–47)
HGB BLD-MCNC: 10.7 G/DL (ref 11.5–16)
HGB UR QL STRIP: NEGATIVE
HYALINE CASTS URNS QL MICRO: ABNORMAL /LPF (ref 0–5)
KETONES UR QL STRIP.AUTO: NEGATIVE MG/DL
LACTATE SERPL-SCNC: 1.8 MMOL/L (ref 0.4–2)
LEUKOCYTE ESTERASE UR QL STRIP.AUTO: ABNORMAL
LYMPHOCYTES # BLD: 3.4 K/UL (ref 0.8–3.5)
LYMPHOCYTES NFR BLD: 20 % (ref 12–49)
MAGNESIUM SERPL-MCNC: 1.6 MG/DL (ref 1.6–2.4)
MCH RBC QN AUTO: 25.8 PG (ref 26–34)
MCHC RBC AUTO-ENTMCNC: 32 G/DL (ref 30–36.5)
MCV RBC AUTO: 80.7 FL (ref 80–99)
MONOCYTES # BLD: 1 K/UL (ref 0–1)
MONOCYTES NFR BLD: 6 % (ref 5–13)
NEUTS SEG # BLD: 12.5 K/UL (ref 1.8–8)
NEUTS SEG NFR BLD: 74 % (ref 32–75)
NITRITE UR QL STRIP.AUTO: NEGATIVE
P-R INTERVAL, ECG05: 154 MS
PH UR STRIP: 5.5 [PH] (ref 5–8)
PLATELET # BLD AUTO: 283 K/UL (ref 150–400)
POTASSIUM SERPL-SCNC: 3.2 MMOL/L (ref 3.5–5.1)
PROT UR STRIP-MCNC: NEGATIVE MG/DL
Q-T INTERVAL, ECG07: 428 MS
QRS DURATION, ECG06: 78 MS
QTC CALCULATION (BEZET), ECG08: 434 MS
RBC # BLD AUTO: 4.14 M/UL (ref 3.8–5.2)
RBC #/AREA URNS HPF: ABNORMAL /HPF (ref 0–5)
SODIUM SERPL-SCNC: 140 MMOL/L (ref 136–145)
SP GR UR REFRACTOMETRY: 1.01 (ref 1–1.03)
TROPONIN I SERPL-MCNC: <0.04 NG/ML
TSH SERPL DL<=0.05 MIU/L-ACNC: 1.1 UIU/ML (ref 0.36–3.74)
UA: UC IF INDICATED,UAUC: ABNORMAL
UROBILINOGEN UR QL STRIP.AUTO: 0.2 EU/DL (ref 0.2–1)
VENTRICULAR RATE, ECG03: 62 BPM
WBC # BLD AUTO: 17 K/UL (ref 3.6–11)
WBC URNS QL MICRO: ABNORMAL /HPF (ref 0–4)

## 2017-12-04 PROCEDURE — 74011000250 HC RX REV CODE- 250: Performed by: STUDENT IN AN ORGANIZED HEALTH CARE EDUCATION/TRAINING PROGRAM

## 2017-12-04 PROCEDURE — 84443 ASSAY THYROID STIM HORMONE: CPT | Performed by: STUDENT IN AN ORGANIZED HEALTH CARE EDUCATION/TRAINING PROGRAM

## 2017-12-04 PROCEDURE — 83880 ASSAY OF NATRIURETIC PEPTIDE: CPT | Performed by: EMERGENCY MEDICINE

## 2017-12-04 PROCEDURE — 87040 BLOOD CULTURE FOR BACTERIA: CPT | Performed by: EMERGENCY MEDICINE

## 2017-12-04 PROCEDURE — 93005 ELECTROCARDIOGRAM TRACING: CPT

## 2017-12-04 PROCEDURE — 65660000000 HC RM CCU STEPDOWN

## 2017-12-04 PROCEDURE — 99285 EMERGENCY DEPT VISIT HI MDM: CPT

## 2017-12-04 PROCEDURE — 94762 N-INVAS EAR/PLS OXIMTRY CONT: CPT

## 2017-12-04 PROCEDURE — 87086 URINE CULTURE/COLONY COUNT: CPT | Performed by: EMERGENCY MEDICINE

## 2017-12-04 PROCEDURE — 71020 XR CHEST PA LAT: CPT

## 2017-12-04 PROCEDURE — 74011250636 HC RX REV CODE- 250/636: Performed by: FAMILY MEDICINE

## 2017-12-04 PROCEDURE — 84484 ASSAY OF TROPONIN QUANT: CPT | Performed by: EMERGENCY MEDICINE

## 2017-12-04 PROCEDURE — 82550 ASSAY OF CK (CPK): CPT | Performed by: EMERGENCY MEDICINE

## 2017-12-04 PROCEDURE — 83605 ASSAY OF LACTIC ACID: CPT | Performed by: EMERGENCY MEDICINE

## 2017-12-04 PROCEDURE — 74011250637 HC RX REV CODE- 250/637: Performed by: STUDENT IN AN ORGANIZED HEALTH CARE EDUCATION/TRAINING PROGRAM

## 2017-12-04 PROCEDURE — 36415 COLL VENOUS BLD VENIPUNCTURE: CPT | Performed by: EMERGENCY MEDICINE

## 2017-12-04 PROCEDURE — 85025 COMPLETE CBC W/AUTO DIFF WBC: CPT | Performed by: EMERGENCY MEDICINE

## 2017-12-04 PROCEDURE — 74011250636 HC RX REV CODE- 250/636: Performed by: EMERGENCY MEDICINE

## 2017-12-04 PROCEDURE — 83735 ASSAY OF MAGNESIUM: CPT | Performed by: EMERGENCY MEDICINE

## 2017-12-04 PROCEDURE — 80048 BASIC METABOLIC PNL TOTAL CA: CPT | Performed by: EMERGENCY MEDICINE

## 2017-12-04 PROCEDURE — 81001 URINALYSIS AUTO W/SCOPE: CPT | Performed by: EMERGENCY MEDICINE

## 2017-12-04 PROCEDURE — 96360 HYDRATION IV INFUSION INIT: CPT

## 2017-12-04 PROCEDURE — 74011250636 HC RX REV CODE- 250/636: Performed by: STUDENT IN AN ORGANIZED HEALTH CARE EDUCATION/TRAINING PROGRAM

## 2017-12-04 RX ORDER — THERA TABS 400 MCG
1 TAB ORAL DAILY
Status: DISCONTINUED | OUTPATIENT
Start: 2017-12-05 | End: 2017-12-05 | Stop reason: HOSPADM

## 2017-12-04 RX ORDER — CLOPIDOGREL BISULFATE 75 MG/1
75 TABLET ORAL DAILY
Status: DISCONTINUED | OUTPATIENT
Start: 2017-12-05 | End: 2017-12-05 | Stop reason: HOSPADM

## 2017-12-04 RX ORDER — SODIUM CHLORIDE 9 MG/ML
75 INJECTION, SOLUTION INTRAVENOUS CONTINUOUS
Status: DISCONTINUED | OUTPATIENT
Start: 2017-12-04 | End: 2017-12-05

## 2017-12-04 RX ORDER — SERTRALINE HYDROCHLORIDE 100 MG/1
100 TABLET, FILM COATED ORAL
COMMUNITY
End: 2017-12-11 | Stop reason: SDUPTHER

## 2017-12-04 RX ORDER — METOPROLOL TARTRATE 25 MG/1
12.5 TABLET, FILM COATED ORAL 2 TIMES DAILY
COMMUNITY
End: 2017-12-11 | Stop reason: ALTCHOICE

## 2017-12-04 RX ORDER — ACETAMINOPHEN 325 MG/1
650 TABLET ORAL
Status: DISCONTINUED | OUTPATIENT
Start: 2017-12-04 | End: 2017-12-05 | Stop reason: HOSPADM

## 2017-12-04 RX ORDER — UREA 40 %
CREAM (GRAM) TOPICAL
Status: DISCONTINUED | OUTPATIENT
Start: 2017-12-05 | End: 2017-12-05 | Stop reason: HOSPADM

## 2017-12-04 RX ORDER — POTASSIUM CHLORIDE 750 MG/1
40 TABLET, FILM COATED, EXTENDED RELEASE ORAL
Status: DISPENSED | OUTPATIENT
Start: 2017-12-04 | End: 2017-12-04

## 2017-12-04 RX ORDER — DONEPEZIL HYDROCHLORIDE 10 MG/1
10 TABLET, FILM COATED ORAL
COMMUNITY
End: 2018-01-09

## 2017-12-04 RX ORDER — SERTRALINE HYDROCHLORIDE 50 MG/1
100 TABLET, FILM COATED ORAL
Status: DISCONTINUED | OUTPATIENT
Start: 2017-12-04 | End: 2017-12-05 | Stop reason: HOSPADM

## 2017-12-04 RX ORDER — METOPROLOL TARTRATE 25 MG/1
12.5 TABLET, FILM COATED ORAL 2 TIMES DAILY
Status: DISCONTINUED | OUTPATIENT
Start: 2017-12-04 | End: 2017-12-05 | Stop reason: HOSPADM

## 2017-12-04 RX ORDER — ENOXAPARIN SODIUM 100 MG/ML
40 INJECTION SUBCUTANEOUS EVERY 24 HOURS
Status: DISCONTINUED | OUTPATIENT
Start: 2017-12-04 | End: 2017-12-05 | Stop reason: HOSPADM

## 2017-12-04 RX ORDER — MAGNESIUM SULFATE HEPTAHYDRATE 40 MG/ML
2 INJECTION, SOLUTION INTRAVENOUS ONCE
Status: COMPLETED | OUTPATIENT
Start: 2017-12-04 | End: 2017-12-04

## 2017-12-04 RX ORDER — SODIUM CHLORIDE 0.9 % (FLUSH) 0.9 %
5-10 SYRINGE (ML) INJECTION EVERY 8 HOURS
Status: DISCONTINUED | OUTPATIENT
Start: 2017-12-04 | End: 2017-12-05 | Stop reason: HOSPADM

## 2017-12-04 RX ORDER — ATORVASTATIN CALCIUM 10 MG/1
10 TABLET, FILM COATED ORAL
COMMUNITY
End: 2018-01-04 | Stop reason: SDUPTHER

## 2017-12-04 RX ORDER — DONEPEZIL HYDROCHLORIDE 5 MG/1
10 TABLET, FILM COATED ORAL
Status: DISCONTINUED | OUTPATIENT
Start: 2017-12-04 | End: 2017-12-05 | Stop reason: HOSPADM

## 2017-12-04 RX ORDER — GUAIFENESIN 100 MG/5ML
162 LIQUID (ML) ORAL
Status: DISPENSED | OUTPATIENT
Start: 2017-12-04 | End: 2017-12-05

## 2017-12-04 RX ORDER — SODIUM CHLORIDE 0.9 % (FLUSH) 0.9 %
5-10 SYRINGE (ML) INJECTION AS NEEDED
Status: DISCONTINUED | OUTPATIENT
Start: 2017-12-04 | End: 2017-12-05 | Stop reason: HOSPADM

## 2017-12-04 RX ORDER — UREA 40 %
CREAM (GRAM) TOPICAL
COMMUNITY
End: 2018-08-31

## 2017-12-04 RX ORDER — ATORVASTATIN CALCIUM 20 MG/1
10 TABLET, FILM COATED ORAL
Status: DISCONTINUED | OUTPATIENT
Start: 2017-12-04 | End: 2017-12-05 | Stop reason: HOSPADM

## 2017-12-04 RX ORDER — METFORMIN HYDROCHLORIDE 750 MG/1
750 TABLET, EXTENDED RELEASE ORAL DAILY
COMMUNITY
End: 2017-12-04

## 2017-12-04 RX ORDER — NITROGLYCERIN 0.4 MG/1
0.4 TABLET SUBLINGUAL AS NEEDED
Status: DISCONTINUED | OUTPATIENT
Start: 2017-12-04 | End: 2017-12-05 | Stop reason: HOSPADM

## 2017-12-04 RX ADMIN — SODIUM CHLORIDE 75 ML/HR: 900 INJECTION, SOLUTION INTRAVENOUS at 22:21

## 2017-12-04 RX ADMIN — DONEPEZIL HYDROCHLORIDE 10 MG: 5 TABLET, FILM COATED ORAL at 22:21

## 2017-12-04 RX ADMIN — SERTRALINE HYDROCHLORIDE 100 MG: 50 TABLET ORAL at 22:22

## 2017-12-04 RX ADMIN — MAGNESIUM SULFATE HEPTAHYDRATE 2 G: 40 INJECTION, SOLUTION INTRAVENOUS at 22:24

## 2017-12-04 RX ADMIN — POTASSIUM CHLORIDE 40 MEQ: 750 TABLET, FILM COATED, EXTENDED RELEASE ORAL at 22:23

## 2017-12-04 RX ADMIN — LIDOCAINE HYDROCHLORIDE 40 ML: 20 SOLUTION ORAL; TOPICAL at 22:19

## 2017-12-04 RX ADMIN — ENOXAPARIN SODIUM 40 MG: 40 INJECTION SUBCUTANEOUS at 22:24

## 2017-12-04 RX ADMIN — ATORVASTATIN CALCIUM 10 MG: 20 TABLET, FILM COATED ORAL at 22:22

## 2017-12-04 RX ADMIN — METOPROLOL TARTRATE 12.5 MG: 25 TABLET ORAL at 22:20

## 2017-12-04 RX ADMIN — SODIUM CHLORIDE 1000 ML: 900 INJECTION, SOLUTION INTRAVENOUS at 17:19

## 2017-12-04 RX ADMIN — Medication 10 ML: at 22:37

## 2017-12-04 RX ADMIN — SODIUM CHLORIDE 1000 ML: 900 INJECTION, SOLUTION INTRAVENOUS at 22:36

## 2017-12-04 NOTE — ED NOTES
Pt amb to restroom. Urine sample collected. On return, pt taken to xray via wheelchair. Will check IV patency and draw labs when pt returns.

## 2017-12-04 NOTE — ED TRIAGE NOTES
Pt c/o cough she noted last night along with chest pain. Some shortness of breath that is worse when she lays down. Seen at [facility] today for same and transported by EMS. Given 1SL nitro given by EMS and pain resolved.

## 2017-12-04 NOTE — ED PROVIDER NOTES
HPI Comments: 62 y.o. female with past medical history significant for stroke, aneurysm, essential HTN who presents via EMS with chief complaint of cough. Pt c/o cough, congestion, and cold-like symptoms that began yesterday. Pt claims she went to [facility] today because she's had intermittent CP that's worse when lying down. Pt denies she has any CP currently, and was given NTG by EMS which she states resolved her pain. Pt denies any fevers, SOB, or abd pain. Pt denies any hx of MI or heart disease. Pt also c/o sneezing with her cold-like symptoms. Pt's WBC was 17,000 at [facility] and came there with the similar symptoms. There are no other acute medical concerns at this time. PCP: Jeremy Dubois MD    Note written by Deepak Paz, as dictated by Alycia Esteban MD 4:14 PM       The history is provided by the patient. No  was used. Past Medical History:   Diagnosis Date    Aneurysm (arteriovenous) of coronary vessels     Right side    Chronic gout of multiple sites 10/24/2017    Depression     on Zoloft    Essential hypertension 6/2/2016    Essential hypertension 10/24/2017    Hx of mammogram 05/23/2017    Negative     Routine Papanicolaou smear 5/13/16    Negative ; HPV Negative     Stroke ([facility] [address] 06/03/15       No past surgical history on file. Family History:   Problem Relation Age of Onset    Heart Attack Mother     Heart Attack Father     Headache Sister     Migraines Sister     Stroke Sister     Headache Brother     Migraines Brother        Social History     Social History    Marital status:      Spouse name: N/A    Number of children: N/A    Years of education: N/A     Occupational History    Not on file.      Social History Main Topics    Smoking status: Former Smoker     Quit date: 7/17/2015    Smokeless tobacco: Never Used      Comment: Never used vapor or e-cigs     Alcohol use No    Drug use: No    Sexual activity: Yes Partners: Male     Birth control/ protection: None     Other Topics Concern    Not on file     Social History Narrative         ALLERGIES: Namenda [memantine]    Review of Systems   Constitutional: Negative for fever. HENT: Positive for congestion and sneezing. Negative for facial swelling and nosebleeds. Eyes: Negative for pain. Respiratory: Positive for cough. Negative for chest tightness and shortness of breath. Cardiovascular: Positive for chest pain. Negative for leg swelling. Gastrointestinal: Negative for abdominal pain, diarrhea and vomiting. Endocrine: Negative for polyuria. Genitourinary: Negative for difficulty urinating and flank pain. Musculoskeletal: Negative for arthralgias and back pain. Skin: Negative for color change. Allergic/Immunologic: Negative for immunocompromised state. Neurological: Negative for dizziness and headaches. Hematological: Does not bruise/bleed easily. Psychiatric/Behavioral: Negative for agitation. All other systems reviewed and are negative. There were no vitals filed for this visit. Physical Exam   Constitutional: She is oriented to person, place, and time. She appears well-developed and well-nourished. HENT:   Head: Normocephalic and atraumatic. Right Ear: External ear normal.   Left Ear: External ear normal.   Nose: Nose normal.   Mouth/Throat: Oropharynx is clear and moist.   Eyes: EOM are normal. Pupils are equal, round, and reactive to light. No scleral icterus. Neck: Normal range of motion. Neck supple. No JVD present. No tracheal deviation present. No thyromegaly present. Cardiovascular: Normal rate, regular rhythm, normal heart sounds and intact distal pulses. Exam reveals no friction rub. No murmur heard. Pulmonary/Chest: Effort normal and breath sounds normal. No stridor. No respiratory distress. She has no wheezes. She has no rales. She exhibits no tenderness. Abdominal: Soft.  Bowel sounds are normal. She exhibits no distension. There is no tenderness. There is no rebound and no guarding. Musculoskeletal: Normal range of motion. She exhibits no edema or tenderness. Lymphadenopathy:     She has no cervical adenopathy. Neurological: She is alert and oriented to person, place, and time. She has normal reflexes. No cranial nerve deficit. Coordination normal.   Skin: Skin is warm and dry. No rash noted. No erythema. Psychiatric: She has a normal mood and affect. Her behavior is normal. Judgment and thought content normal.   Nursing note and vitals reviewed. Note written by Deepak Mccullough, as dictated by Clemencia Goins MD 6:12 PM      MDM  Number of Diagnoses or Management Options  Diagnosis management comments: 26-year-old  female presents to the emergency department with cough, chest pain. Patient received nitroglycerin which helped her chest pain. No chest pain currently. Will check basic blood work, troponin, chest x-ray, urinalysis. We'll reassess when testing is complete. Patient agrees with this plan. Amount and/or Complexity of Data Reviewed  Clinical lab tests: ordered and reviewed  Tests in the radiology section of CPT®: ordered and reviewed  Tests in the medicine section of CPT®: ordered and reviewed  Decide to obtain previous medical records or to obtain history from someone other than the patient: yes  Obtain history from someone other than the patient: yes  Review and summarize past medical records: yes  Independent visualization of images, tracings, or specimens: yes    Risk of Complications, Morbidity, and/or Mortality  Presenting problems: high  Diagnostic procedures: high  Management options: high    Patient Progress  Patient progress: improved    ED Course       Procedures    ED EKG interpretation:  Rhythm: normal sinus rhythm; and regular . Rate (approx.): 62; Axis: normal; ST/T wave: no elevations or depressions; Normal intervals.   Note written by Ricardo Thomas Scribe, as dictated by Arslan Simpson MD 4:32 PM    PROGRESS NOTE:  6:11 PM  Arslan Simpson spoke with Karis Hickey family medicine resident who will come see the pt for her CP that improved with NTG.     Trop was negative    CXR is clear    WBC is 17

## 2017-12-04 NOTE — TELEPHONE ENCOUNTER
Talked with [place] at [facility], & was informed referral was not needed for home health nurse services to be continued. This service was emerged to [employer] when new insurance went into force on 12/1/17. Patient's daughter, Homero Gonzales informed.

## 2017-12-04 NOTE — TELEPHONE ENCOUNTER
Phone call with physician seeing her at [facility].  Congestion with respiratory related chest pain, however, she is showing T wave inversion on EKG and her white count is 17,000. He is sending her to the ER by merlin. Agree.

## 2017-12-04 NOTE — PROGRESS NOTES
[facility]: MED RECONCILIATION    Comments/Recommendations:     Medications added:     · none    Medications removed:    · Metformin - patient's family reports that PCP took her off of metformin for now and that her BG has been fine    Medications adjusted:    · Atorvastatin - takes QHS  · Sertraline - takes QHS  · Urea 40% cream - applies to feet every evening after showering    Information obtained from: patient, patient's family, medication list provided from home, Rx query    Significant PMH/Disease States:   Past Medical History:   Diagnosis Date    Aneurysm (arteriovenous) of coronary vessels     Right side    Chronic gout of multiple sites 10/24/2017    Depression     on Zoloft    Essential hypertension 6/2/2016    Essential hypertension 10/24/2017    Hx of mammogram 05/23/2017    Negative     Routine Papanicolaou smear 5/13/16    Negative ; HPV Negative     [facility]15     Chief Complaint for this Admission:   Chief Complaint   Patient presents with    Chest Pain    Cough     Allergies: Namenda [memantine]    Prior to Admission Medications:     Medication Documentation Review Audit       Reviewed by AYESHA AdamsD (Pharmacist) on 12/04/17 at 1833         Medication Sig Documenting Provider Last Dose Status Taking?      atorvastatin (LIPITOR) 10 mg tablet Take 10 mg by mouth nightly. April Moreau MD 12/3/2017 pm Active Yes    butalbital-acetaminophen-caffeine (FIORICET) -40 mg per tablet 1-2 tabs at onset of moderate headache. May repeat 1 tab in 4 hours if needed. Limit to 3 days a week maximum. Samir Schmidt MD 12/4/2017 am Active Yes    clopidogrel (PLAVIX) 75 mg tab TAKE 1 TAB BY MOUTH DAILY. FOR STROKE PREVENTION. Brigid Porter MD 12/4/2017 am Active Yes    donepezil (ARICEPT) 10 mg tablet Take 10 mg by mouth nightly. April Moreau MD 12/3/2017 pm Active Yes    metoprolol tartrate (LOPRESSOR) 25 mg tablet Take 12.5 mg by mouth two (2) times a day.  Historical Provider 12/4/2017 am Active Yes    multivitamin (ONE A DAY) tablet Take 1 Tab by mouth daily. Historical Provider 12/4/2017 am Active Yes    sertraline (ZOLOFT) 100 mg tablet Take 100 mg by mouth nightly. Historical Provider 12/3/2017 pm Active Yes    urea (CARMOL) 40 % topical cream Apply  to affected area nightly. Apply to feet after shower Historical Provider 12/3/2017 pm Active Yes                    Thank you for the consult,  Sergei MARIE [facility]

## 2017-12-05 ENCOUNTER — APPOINTMENT (OUTPATIENT)
Dept: GENERAL RADIOLOGY | Age: 57
DRG: 392 | End: 2017-12-05
Attending: STUDENT IN AN ORGANIZED HEALTH CARE EDUCATION/TRAINING PROGRAM
Payer: MEDICARE

## 2017-12-05 VITALS
HEART RATE: 64 BPM | DIASTOLIC BLOOD PRESSURE: 70 MMHG | SYSTOLIC BLOOD PRESSURE: 158 MMHG | OXYGEN SATURATION: 97 % | RESPIRATION RATE: 17 BRPM | BODY MASS INDEX: 18.48 KG/M2 | HEIGHT: 66 IN | TEMPERATURE: 98.4 F | WEIGHT: 115 LBS

## 2017-12-05 LAB
ALBUMIN SERPL-MCNC: 2.6 G/DL (ref 3.5–5)
ALBUMIN/GLOB SERPL: 0.8 {RATIO} (ref 1.1–2.2)
ALP SERPL-CCNC: 86 U/L (ref 45–117)
ALT SERPL-CCNC: 47 U/L (ref 12–78)
ANION GAP SERPL CALC-SCNC: 6 MMOL/L (ref 5–15)
AST SERPL-CCNC: 31 U/L (ref 15–37)
B PERT DNA SPEC QL NAA+PROBE: NOT DETECTED
BASOPHILS # BLD: 0 K/UL (ref 0–0.1)
BASOPHILS NFR BLD: 0 % (ref 0–1)
BILIRUB SERPL-MCNC: 0.1 MG/DL (ref 0.2–1)
BUN SERPL-MCNC: 9 MG/DL (ref 6–20)
BUN/CREAT SERPL: 13 (ref 12–20)
C PNEUM DNA SPEC QL NAA+PROBE: NOT DETECTED
CALCIUM SERPL-MCNC: 8 MG/DL (ref 8.5–10.1)
CHLORIDE SERPL-SCNC: 111 MMOL/L (ref 97–108)
CO2 SERPL-SCNC: 28 MMOL/L (ref 21–32)
CREAT SERPL-MCNC: 0.7 MG/DL (ref 0.55–1.02)
DEPRECATED S PYO AG THROAT QL EIA: NEGATIVE
EOSINOPHIL # BLD: 0.2 K/UL (ref 0–0.4)
EOSINOPHIL NFR BLD: 1 % (ref 0–7)
ERYTHROCYTE [DISTWIDTH] IN BLOOD BY AUTOMATED COUNT: 13.3 % (ref 11.5–14.5)
FLUAV H1 2009 PAND RNA SPEC QL NAA+PROBE: NOT DETECTED
FLUAV H1 RNA SPEC QL NAA+PROBE: NOT DETECTED
FLUAV H3 RNA SPEC QL NAA+PROBE: NOT DETECTED
FLUAV SUBTYP SPEC NAA+PROBE: NOT DETECTED
FLUBV RNA SPEC QL NAA+PROBE: NOT DETECTED
GLOBULIN SER CALC-MCNC: 3.3 G/DL (ref 2–4)
GLUCOSE BLD STRIP.AUTO-MCNC: 117 MG/DL (ref 65–100)
GLUCOSE BLD STRIP.AUTO-MCNC: 215 MG/DL (ref 65–100)
GLUCOSE SERPL-MCNC: 121 MG/DL (ref 65–100)
HADV DNA SPEC QL NAA+PROBE: NOT DETECTED
HCOV 229E RNA SPEC QL NAA+PROBE: NOT DETECTED
HCOV HKU1 RNA SPEC QL NAA+PROBE: NOT DETECTED
HCOV NL63 RNA SPEC QL NAA+PROBE: NOT DETECTED
HCOV OC43 RNA SPEC QL NAA+PROBE: NOT DETECTED
HCT VFR BLD AUTO: 28.8 % (ref 35–47)
HGB BLD-MCNC: 9.1 G/DL (ref 11.5–16)
HMPV RNA SPEC QL NAA+PROBE: NOT DETECTED
HPIV1 RNA SPEC QL NAA+PROBE: NOT DETECTED
HPIV2 RNA SPEC QL NAA+PROBE: NOT DETECTED
HPIV3 RNA SPEC QL NAA+PROBE: NOT DETECTED
HPIV4 RNA SPEC QL NAA+PROBE: NOT DETECTED
LIPASE SERPL-CCNC: 179 U/L (ref 73–393)
LYMPHOCYTES # BLD: 3.8 K/UL (ref 0.8–3.5)
LYMPHOCYTES NFR BLD: 31 % (ref 12–49)
M PNEUMO DNA SPEC QL NAA+PROBE: NOT DETECTED
MAGNESIUM SERPL-MCNC: 2.2 MG/DL (ref 1.6–2.4)
MCH RBC QN AUTO: 25.6 PG (ref 26–34)
MCHC RBC AUTO-ENTMCNC: 31.6 G/DL (ref 30–36.5)
MCV RBC AUTO: 80.9 FL (ref 80–99)
MONOCYTES # BLD: 0.6 K/UL (ref 0–1)
MONOCYTES NFR BLD: 5 % (ref 5–13)
NEUTS SEG # BLD: 7.9 K/UL (ref 1.8–8)
NEUTS SEG NFR BLD: 63 % (ref 32–75)
PHOSPHATE SERPL-MCNC: 2.2 MG/DL (ref 2.6–4.7)
PLATELET # BLD AUTO: 229 K/UL (ref 150–400)
POTASSIUM SERPL-SCNC: 3.7 MMOL/L (ref 3.5–5.1)
PROT SERPL-MCNC: 5.9 G/DL (ref 6.4–8.2)
RBC # BLD AUTO: 3.56 M/UL (ref 3.8–5.2)
RSV RNA SPEC QL NAA+PROBE: NOT DETECTED
RV+EV RNA SPEC QL NAA+PROBE: NOT DETECTED
SERVICE CMNT-IMP: ABNORMAL
SERVICE CMNT-IMP: ABNORMAL
SODIUM SERPL-SCNC: 145 MMOL/L (ref 136–145)
TROPONIN I SERPL-MCNC: <0.04 NG/ML
TROPONIN I SERPL-MCNC: <0.04 NG/ML
WBC # BLD AUTO: 12.5 K/UL (ref 3.6–11)

## 2017-12-05 PROCEDURE — 84484 ASSAY OF TROPONIN QUANT: CPT | Performed by: STUDENT IN AN ORGANIZED HEALTH CARE EDUCATION/TRAINING PROGRAM

## 2017-12-05 PROCEDURE — 87880 STREP A ASSAY W/OPTIC: CPT | Performed by: STUDENT IN AN ORGANIZED HEALTH CARE EDUCATION/TRAINING PROGRAM

## 2017-12-05 PROCEDURE — 82962 GLUCOSE BLOOD TEST: CPT

## 2017-12-05 PROCEDURE — 86710 INFLUENZA VIRUS ANTIBODY: CPT | Performed by: STUDENT IN AN ORGANIZED HEALTH CARE EDUCATION/TRAINING PROGRAM

## 2017-12-05 PROCEDURE — 83735 ASSAY OF MAGNESIUM: CPT | Performed by: STUDENT IN AN ORGANIZED HEALTH CARE EDUCATION/TRAINING PROGRAM

## 2017-12-05 PROCEDURE — 87633 RESP VIRUS 12-25 TARGETS: CPT | Performed by: FAMILY MEDICINE

## 2017-12-05 PROCEDURE — 85025 COMPLETE CBC W/AUTO DIFF WBC: CPT | Performed by: STUDENT IN AN ORGANIZED HEALTH CARE EDUCATION/TRAINING PROGRAM

## 2017-12-05 PROCEDURE — 83690 ASSAY OF LIPASE: CPT | Performed by: STUDENT IN AN ORGANIZED HEALTH CARE EDUCATION/TRAINING PROGRAM

## 2017-12-05 PROCEDURE — 71010 XR CHEST PORT: CPT

## 2017-12-05 PROCEDURE — 86738 MYCOPLASMA ANTIBODY: CPT | Performed by: STUDENT IN AN ORGANIZED HEALTH CARE EDUCATION/TRAINING PROGRAM

## 2017-12-05 PROCEDURE — 84100 ASSAY OF PHOSPHORUS: CPT | Performed by: STUDENT IN AN ORGANIZED HEALTH CARE EDUCATION/TRAINING PROGRAM

## 2017-12-05 PROCEDURE — 36415 COLL VENOUS BLD VENIPUNCTURE: CPT | Performed by: STUDENT IN AN ORGANIZED HEALTH CARE EDUCATION/TRAINING PROGRAM

## 2017-12-05 PROCEDURE — 74011250637 HC RX REV CODE- 250/637: Performed by: STUDENT IN AN ORGANIZED HEALTH CARE EDUCATION/TRAINING PROGRAM

## 2017-12-05 PROCEDURE — 87070 CULTURE OTHR SPECIMN AEROBIC: CPT | Performed by: FAMILY MEDICINE

## 2017-12-05 PROCEDURE — 80053 COMPREHEN METABOLIC PANEL: CPT | Performed by: STUDENT IN AN ORGANIZED HEALTH CARE EDUCATION/TRAINING PROGRAM

## 2017-12-05 PROCEDURE — 87389 HIV-1 AG W/HIV-1&-2 AB AG IA: CPT | Performed by: STUDENT IN AN ORGANIZED HEALTH CARE EDUCATION/TRAINING PROGRAM

## 2017-12-05 RX ORDER — ACETAMINOPHEN 325 MG/1
650 TABLET ORAL
Qty: 30 TAB | Refills: 0 | OUTPATIENT
Start: 2017-12-05 | End: 2017-12-05

## 2017-12-05 RX ORDER — FLUTICASONE PROPIONATE 50 MCG
2 SPRAY, SUSPENSION (ML) NASAL DAILY
Status: DISCONTINUED | OUTPATIENT
Start: 2017-12-05 | End: 2017-12-05 | Stop reason: HOSPADM

## 2017-12-05 RX ORDER — BENZONATATE 100 MG/1
100 CAPSULE ORAL
Status: DISCONTINUED | OUTPATIENT
Start: 2017-12-05 | End: 2017-12-05 | Stop reason: HOSPADM

## 2017-12-05 RX ORDER — PHENOL/SODIUM PHENOLATE
20 AEROSOL, SPRAY (ML) MUCOUS MEMBRANE DAILY
Qty: 30 TAB | Refills: 0 | Status: SHIPPED | OUTPATIENT
Start: 2017-12-05 | End: 2017-12-11 | Stop reason: SDUPTHER

## 2017-12-05 RX ORDER — PHENOL/SODIUM PHENOLATE
20 AEROSOL, SPRAY (ML) MUCOUS MEMBRANE DAILY
Qty: 30 TAB | Refills: 0 | OUTPATIENT
Start: 2017-12-05 | End: 2017-12-05

## 2017-12-05 RX ORDER — ACETAMINOPHEN 325 MG/1
650 TABLET ORAL
Qty: 30 TAB | Refills: 0 | Status: SHIPPED | OUTPATIENT
Start: 2017-12-05 | End: 2018-02-06

## 2017-12-05 RX ADMIN — FLUTICASONE PROPIONATE 2 SPRAY: 50 SPRAY, METERED NASAL at 09:20

## 2017-12-05 RX ADMIN — METOPROLOL TARTRATE 12.5 MG: 25 TABLET ORAL at 09:21

## 2017-12-05 RX ADMIN — THERA TABS 1 TABLET: TAB at 09:21

## 2017-12-05 RX ADMIN — CLOPIDOGREL 75 MG: 75 TABLET, FILM COATED ORAL at 09:20

## 2017-12-05 NOTE — ED NOTES
Assumed care of patient from [facility]. Patient resting on stretcher, NAD noted at this time; Denies complaints. Bed low and locked, call bell in reach. Will continue to monitor.

## 2017-12-05 NOTE — CONSULTS
Harjinder Patel MD, [facility]  Office [phone]    Date of  Admission: 12/4/2017  4:08 PM         IMPRESSION and RECOMMENDATIONS     1.  CP:  Noncardiac. Very atypical symptoms with normal EKG and troponins. No further cardiac testing warranted at this time. Commended her on smoking cessation. Stable for discharge from a cardiac standpoint. I have discussed this plan with the patient. She appears to understand this plan and wishes to proceed ahead. Problem List  Date Reviewed: 12/5/2017          Codes Class Noted    * (Principal)Chest pain ICD-10-CM: R07.9  ICD-9-CM: 786.50  12/4/2017        Chronic tension-type headache, intractable ICD-10-CM: K36.853  ICD-9-CM: 339.12  3/2/2017        Vascular dementia without behavioral disturbance ICD-10-CM: F01.50  ICD-9-CM: 290.40  7/28/2016        Hx of completed stroke ICD-10-CM: Z86.73  ICD-9-CM: V12.54  7/28/2016        Essential hypertension ICD-10-CM: I10  ICD-9-CM: 401.9  6/2/2016        Type 2 diabetes mellitus without complication ([facility] [address] ALS-28-YW: E11.9  ICD-9-CM: 250.00  6/2/2016        Left spastic hemiparesis ([facility] [address] ICD-10-CM: G81.14  ICD-9-CM: 342.10  11/10/2015        Cerebral aneurysm ICD-10-CM: I67.1  ICD-9-CM: 437.3  11/10/2015        Episodic tension-type headache, not intractable ICD-10-CM: U30.584  ICD-9-CM: 339.11  11/10/2015        Status post stroke ICD-10-CM: Z86.73  ICD-9-CM: V12.54  8/8/2015              History of Present Illness:     Luis Shore is a 62 y.o. female with the above problem list who was admitted for Chest pain. Pt presents with multiple complaints. Asked to see for chest discomfort described as \"burning like acid reflux\" in center of chest starting yesterday, aggravated by lying down. Now resolved. No radiation. Also notes dry cough, rhinorrhea, post-nasal drip.      She denies anginal-sounding chest pain/discomfort, shortness of breath, dyspnea on exertion, orthopnea, paroxysmal noctural dyspnea, lower extremity edema, palpitations, syncope, or near-syncope. Current Facility-Administered Medications   Medication Dose Route Frequency    benzonatate (TESSALON) capsule 100 mg  100 mg Oral TID PRN    benzocaine-menthol (CEPACOL) lozenge 1 Lozenge  1 Lozenge Mucous Membrane PRN    fluticasone (FLONASE) 50 mcg/actuation nasal spray 2 Spray  2 Spray Both Nostrils DAILY    atorvastatin (LIPITOR) tablet 10 mg  10 mg Oral QHS    clopidogrel (PLAVIX) tablet 75 mg  75 mg Oral DAILY    donepezil (ARICEPT) tablet 10 mg  10 mg Oral QHS    metoprolol tartrate (LOPRESSOR) tablet 12.5 mg  12.5 mg Oral BID    therapeutic multivitamin (THERAGRAN) tablet 1 Tab  1 Tab Oral DAILY    sertraline (ZOLOFT) tablet 100 mg  100 mg Oral QHS    urea (CARMOL) 40 % topical cream   Topical QHS    sodium chloride (NS) flush 5-10 mL  5-10 mL IntraVENous Q8H    sodium chloride (NS) flush 5-10 mL  5-10 mL IntraVENous PRN    acetaminophen (TYLENOL) tablet 650 mg  650 mg Oral Q4H PRN    enoxaparin (LOVENOX) injection 40 mg  40 mg SubCUTAneous Q24H    nitroglycerin (NITROSTAT) tablet 0.4 mg  0.4 mg SubLINGual PRN    0.9% sodium chloride infusion  75 mL/hr IntraVENous CONTINUOUS     Current Outpatient Prescriptions   Medication Sig    donepezil (ARICEPT) 10 mg tablet Take 10 mg by mouth nightly.  atorvastatin (LIPITOR) 10 mg tablet Take 10 mg by mouth nightly.  metoprolol tartrate (LOPRESSOR) 25 mg tablet Take 12.5 mg by mouth two (2) times a day.  sertraline (ZOLOFT) 100 mg tablet Take 100 mg by mouth nightly.  urea (CARMOL) 40 % topical cream Apply  to affected area nightly. Apply to feet after shower    clopidogrel (PLAVIX) 75 mg tab TAKE 1 TAB BY MOUTH DAILY. FOR STROKE PREVENTION.  butalbital-acetaminophen-caffeine (FIORICET) -40 mg per tablet 1-2 tabs at onset of moderate headache. May repeat 1 tab in 4 hours if needed.   Limit to 3 days a week maximum.  multivitamin (ONE A DAY) tablet Take 1 Tab by mouth daily. Allergies   Allergen Reactions    Namenda [Memantine] Other (comments)     Side effects      Family History   Problem Relation Age of Onset    Heart Attack Mother     Heart Attack Father     Headache Sister     Migraines Sister     Stroke Sister     Headache Brother     Migraines Brother       Social History     Social History    Marital status:      Spouse name: N/A    Number of children: N/A    Years of education: N/A     Occupational History    Not on file. Social History Main Topics    Smoking status: Former Smoker     Quit date: 7/17/2015    Smokeless tobacco: Never Used      Comment: Never used vapor or e-cigs     Alcohol use No    Drug use: No    Sexual activity: Yes     Partners: Male     Birth control/ protection: None     Other Topics Concern    Not on file     Social History Narrative       Physical Exam:     Patient Vitals for the past 16 hrs:   BP Pulse Resp SpO2   12/05/17 0600 (!) 127/91 93 21 98 %   12/05/17 0400 (!) 129/97 72 17 96 %   12/05/17 0100 125/66 73 18 96 %   12/04/17 2240 - 71 17 97 %   12/04/17 2230 134/72 79 17 95 %   12/04/17 2215 (!) 160/110 98 14 94 %   12/04/17 2115 - 75 19 96 %   12/04/17 2100 115/63 70 20 95 %   12/04/17 2045 - 72 21 100 %   12/04/17 2030 - 69 20 96 %   12/04/17 2015 - 65 19 96 %   12/04/17 2000 117/48 64 18 97 %   12/04/17 1945 - 64 16 96 %   12/04/17 1930 - 66 19 98 %   12/04/17 1915 - 68 16 99 %   12/04/17 1900 145/66 70 18 96 %   12/04/17 1801 113/65 68 16 98 %       HEENT Exam:     Normocephalic, atraumatic. EOMI. Oropharynx negative. Neck supple. No lymphadenopathy. Lung Exam:     The patient is not dyspneic. There is no cough. Breath sounds are heard equally in all lung fields. There are no wheezes, rales, rhonchi, or rubs heard on auscultation. Heart Exam:     The rhythm is regular.  The PMI is in the 5th intercostal space of the MCL. Apical impulse is normal. S1 is regular. S2 is physiologic. There is no S3, S4 gallop, murmur, click, or rub. Abdomen Exam:     Bowel sounds are normoactive. Abdomen soft in all quadrants. No tenderness. No palpable masses. No organomegaly. No hernias noted. No bruits or pulsatile mass. Extremities Exam:     The extremities are atraumatic appearing. There is no clubbing, cyanosis, edema, ulcers, varicose veins, rash, erythemia noted in the extremities. The neurovascular status is grossly intact with normal distal sensation and pulses. Vascular Exam:     The radial, brachial, dorsalis pedis, posterior tibial, are equal and strong bilaterally The carotids are equal bilaterally without bruits. Labs:     Lab Results   Component Value Date/Time    Glucose 121 12/05/2017 04:21 AM    Sodium 145 12/05/2017 04:21 AM    Potassium 3.7 12/05/2017 04:21 AM    Chloride 111 12/05/2017 04:21 AM    CO2 28 12/05/2017 04:21 AM    BUN 9 12/05/2017 04:21 AM    Creatinine 0.70 12/05/2017 04:21 AM    Calcium 8.0 12/05/2017 04:21 AM     Recent Labs      12/05/17   0421  12/04/17   1705   WBC  12.5*  17.0*   HGB  9.1*  10.7*   HCT  28.8*  33.4*   PLT  229  283     Recent Labs      12/05/17   0421   SGOT  31   ALT  47   AP  86   TBILI  0.1*   TP  5.9*   ALB  2.6*   GLOB  3.3     No results for input(s): INR, PTP, APTT in the last 72 hours. No lab exists for component: INREXT   No results for input(s): CPK, CKMB, TNIPOC in the last 72 hours.     No lab exists for component: Khoi Dunham  Recent Labs      12/05/17 0420   TROIQ  <0.04     Lab Results   Component Value Date/Time    Cholesterol, total 102 04/14/2017 09:32 AM    HDL Cholesterol 48 04/14/2017 09:32 AM    LDL, calculated 37 04/14/2017 09:32 AM    Triglyceride 85 04/14/2017 09:32 AM       EKG:  NSR @ 62, no isch

## 2017-12-05 NOTE — DISCHARGE SUMMARY
[facility] [facility] [address] [place] [address]   Office [phone]  Fax [phone]       Discharge / Transfer / Off-Service Note     Name: Brady Liu MRN: [id number]  Sex: Female   YOB: 1960  Age: 62 y.o. PCP: Vicente Johnson MD     Date of admission: 12/4/2017  Date of discharge/transfer: 12/5/2017    Attending physician at admission: Dr. Oliver Vera    Attending physician at discharge/transfer: Dr. Oliver Vera    Resident physician at discharge/transfer: Nikunj Damon MD     Consultants during hospitalization  Dr. Jam Joyce (Cardiology)     Admission diagnoses   Chest pain    Recommended follow-up after discharge  · PCP     History of Present Illness    Per admitting provider Dr. Akosua Schreiber, Liz Su is a 62 y.o. female with known hx of CVA, vascular dementia, HTN, DM Type II  who presents to the ER complaining of cough with congestion and postnasal drip. Patient reports that she has had a dry cough and chest pain. The chest pain is sternal location and is described as  burning sensation that is felt most when she is lying flat on her back. She denies any palpitations. She has not tried taking any medication for the pain or the cold symptoms. She denies any myalgias. She has not taken her temperature but does endorse chills. She has about a 60 pack yr hx of smoking and her daughter reports that she has had a chronic cough for a while. She reports having some abdominal pain and nausea yesterday, however [facility] with [facility] same symptoms prior to arriving here via EMS. Patient reports that yesterday she was at [employer] and reports helping out with [employer] and notes that some of the children were sick. [facility] course    ACS Rule Out - Patient with EKG that was initially read as T-wave inversions but then was read as normal by Cardiology. Troponins negative x 3. POA , TSH wnl 1.10. CXR was negative both on arrival and the next morning. Cardiology was consulted who felt her symptoms were noncardiac in origin, atypical, and did not require any additional cardiac testing. Recently quit smoking. Chest pain likely MSK or GERD-related. GERD - Lipase normal. Given the burning sensation in patient's chest and that it improved with a GI cocktail, started patient on daily Prilosec and recommended she follow up outpatient with her PCP. Dry cough, sore throat - Chronic, intensified prior to admission. Also had leukocytosis to 17 on admission which improved to 12.5 at time of discharge. Rapid strep was negative, respiratory panel was negative. At time of discharge, Mycoplasma, throat culture, blood culture, and urine culture were still pending. Diabetes Mellitus T2:  A1c 6.6 on 9/12/17, no home medications. [facility]. Hx of CVA: stable, continued home plavix 75mg daily     Vascular dementia: Stable during admission, continued home aricept 10mg daily.      HTN: continued home metoprolol 12.5mg BID     Depression: Continued home zoloft 100mg dailly     HLD: Last lipid panel 4/15/17 and showed total cholesterol 102, TG 85, HDL 48, LDL 37. Continued home Lipitor 10 mg daily        Physical exam at discharge:    Vitals Reviewed. General Oriented to person, place, and time and well-developed. Appears well-nourished, no distress. Not diaphoretic. HENT Head Normocephalic and atraumatic. Eyes Conjunctivae are normal, no discharge. No scleral icterus. Nose Nose normal, clear turbinates. Oral Oropharynx is clear and moist. No oropharyngeal exudate. Neck No thyromegaly present. No cervical adenopathy. Cardio Normal rate, regular rhythm. Exam reveals no gallop and no friction rub. No murmur heard. No chest wall tenderness. Pulmonary Effort normal and breath sounds normal. No respiratory distress. No wheezes, no rales. Abdominal Soft. Bowel sounds normal. No distension. No tenderness.      Deferred. Extremities No edema of lower extremities. No tenderness. Distal pulses intact. Neurological Alert and oriented to person, place, and time. Dermatology Skin is warm and dry. No rash noted. No erythema or pallor. Psychiatric Affect and judgment normal.        Condition at discharge: Stable. Labs  Recent Labs      12/05/17   0421  12/04/17   1705   WBC  12.5*  17.0*   HGB  9.1*  10.7*   HCT  28.8*  33.4*   PLT  229  283     Recent Labs      12/05/17   0421  12/04/17   1705   NA  145  140   K  3.7  3.2*   CL  111*  104   CO2  28  27   BUN  9  11   CREA  0.70  0.90   GLU  121*  107*   CA  8.0*  8.8   MG  2.2  1.6   PHOS  2.2*   --      Recent Labs      12/05/17   0421  12/05/17   0006   SGOT  31   --    ALT  47   --    AP  86   --    TBILI  0.1*   --    TP  5.9*   --    ALB  2.6*   --    GLOB  3.3   --    LPSE   --   179     Recent Labs      12/05/17   1227  12/05/17   0717  12/05/17   0420  12/05/17   0006  12/04/17   1705   TROIQ   --    --   <0.04  <0.04  <0.04   GLUCPOC  215*  117*   --    --    --      Cultures  - Mycoplasma pending  - Throat culture pending  - Respiratory panel negative  - Rapid strep negative  - Blood culture pending  - Urine culture pending    Imaging    Results from Hospital Encounter encounter on 12/04/17   XR CHEST PORT   Narrative EXAM:  XR CHEST PORT    INDICATION:  Cough, congestion, and cold like symptoms which began yesterday. Intermittent chest pain is worse with lying down. COMPARISON:  Chest x-ray 12/4/2017. FINDINGS: A portable AP radiograph of the chest was obtained at 04:41 hours. The  patient is on a cardiac monitor. The lungs are clear apart from some linear  atelectasis in the left lower lobe. The cardiac and mediastinal contours and  pulmonary vascularity are normal.  The bones and soft tissues are grossly within  normal limits. Impression IMPRESSION: No acute findings. No results found for this or any previous visit. Chronic diagnoses   Problem List as of 12/5/2017  Date Reviewed: 12/5/2017          Codes Class Noted - Resolved    * (Principal)Chest pain ICD-10-CM: R07.9  ICD-9-CM: 786.50  12/4/2017 - Present        Chronic tension-type headache, intractable ICD-10-CM: I87.108  ICD-9-CM: 339.12  3/2/2017 - Present        Vascular dementia without behavioral disturbance ICD-10-CM: F01.50  ICD-9-CM: 290.40  7/28/2016 - Present        Hx of completed stroke ICD-10-CM: Z86.73  ICD-9-CM: V12.54  7/28/2016 - Present        Essential hypertension ICD-10-CM: I10  ICD-9-CM: 401.9  6/2/2016 - Present        Type 2 diabetes mellitus without complication ([facility] PYV-69-GZ: E11.9  ICD-9-CM: 250.00  6/2/2016 - Present        Left spastic hemiparesis ([facility] ICD-10-CM: G81.14  ICD-9-CM: 342.10  11/10/2015 - Present        Cerebral aneurysm ICD-10-CM: I67.1  ICD-9-CM: 437.3  11/10/2015 - Present        Episodic tension-type headache, not intractable ICD-10-CM: Y21.282  ICD-9-CM: 339.11  11/10/2015 - Present        Status post stroke ICD-10-CM: Z86.73  ICD-9-CM: V12.54  8/8/2015 - Present              Discharge/Transfer Medications  Discharge Medication List as of 12/5/2017  1:13 PM      CONTINUE these medications which have CHANGED    Details   acetaminophen (TYLENOL) 325 mg tablet Take 2 Tabs by mouth every six (6) hours as needed for Pain., Print, Disp-30 Tab, R-0      Omeprazole delayed release (PRILOSEC D/R) 20 mg tablet Take 1 Tab by mouth daily. , Print, Disp-30 Tab, R-0         CONTINUE these medications which have NOT CHANGED    Details   donepezil (ARICEPT) 10 mg tablet Take 10 mg by mouth nightly., Historical Med      atorvastatin (LIPITOR) 10 mg tablet Take 10 mg by mouth nightly., Historical Med      metoprolol tartrate (LOPRESSOR) 25 mg tablet Take 12.5 mg by mouth two (2) times a day., Historical Med      sertraline (ZOLOFT) 100 mg tablet Take 100 mg by mouth nightly., Historical Med      urea (CARMOL) 40 % topical cream Apply  to affected area nightly. Apply to feet after shower, Historical Med      clopidogrel (PLAVIX) 75 mg tab TAKE 1 TAB BY MOUTH DAILY. FOR STROKE PREVENTION., Normal, Disp-90 Tab, R-3      butalbital-acetaminophen-caffeine (FIORICET) -40 mg per tablet 1-2 tabs at onset of moderate headache. May repeat 1 tab in 4 hours if needed. Limit to 3 days a week maximum. , Print, Disp-30 Tab, R-2      multivitamin (ONE A DAY) tablet Take 1 Tab by mouth daily. , Historical Med              Diet:  Regular diet.     Activity:  As tolerated    Disposition: Home or Self Care    Discharge instructions to patient/family  Please seek medical attention for any new or worsening symptoms particularly fever, chest pain, shortness of breath, abdominal pain, nausea, vomiting    Follow up plans/appointments  Follow-up Information     Follow up With Details Comments [address], [place] Go on 12/11/2017 at 1:00pm [facility] Lance Benson MD  Family Medicine Resident       For Billing    Chief Complaint   Patient presents with    Chest Pain   [address] [place] [address] Cough       Hospital Problems  Date Reviewed: 12/5/2017          Codes Class Noted POA    * (Principal)Chest pain ICD-10-CM: R07.9  ICD-9-CM: 786.50  12/4/2017 Unknown        Vascular dementia without behavioral disturbance ICD-10-CM: F01.50  ICD-9-CM: 290.40  7/28/2016 Yes        Hx of completed stroke ICD-10-CM: Z86.73  ICD-9-CM: V12.54  7/28/2016 Yes        Essential hypertension ICD-10-CM: I10  ICD-9-CM: 401.9  6/2/2016 Yes        Type 2 diabetes mellitus without complication ([facility] SYK-54-JZ: E11.9  ICD-9-CM: 250.00  6/2/2016 Yes

## 2017-12-05 NOTE — PROGRESS NOTES
[facility]   Senior Resident Admission Note    CC: cough, chest pain    HPI:  Nick Del Castillo is a 62 y.o. female who presents to the ER complaining of one day history of cough and new onset intermittent, burning chest pain. Chart reviewed. Patient seen, examined, and discussed with Dr. Brook Chen (PGY-1). See his note for more details. Patient Vitals for the past 4 hrs:   Temp Pulse Resp BP SpO2   12/04/17 1801 - 68 16 113/65 98 %   12/04/17 1629 97.8 °F (36.6 °C) 67 18 132/79 96 %        Assessment and Plan    Chest pain - ACS rule out  - Admit to Tele  - EKG appears normal. No prior EKG to compare. - Trend troponins q6h   - Check TSH, pBNP  - Consult Cardiology  - Morphine, NTG prn for chest pain    Leukocytosis. VSS stable. - Urine culture  - Blood culture  - Repeat CXR in AM    Cough. - viral panel, legionella, mycoplasma, strep pneumo  - Consider o/p ENT follow up for chronic sore throat    - Consider GI cocktail for sore throat and chest pain for possible GERD    Mild abdominal pain and nausea. Benign abdominal exam.  - consider obtaining abdominal imaging is pain persists. - Check lipase    Electrolyte disturbances  - Replete K and Mg    I agree with remaining assessment and plan as documented in Dr. Catherine Fuentes note. Pt will be discussed with Dr. José Luis Peralta (on-call attending physician).     Cherrie Dawson MD  Family Medicine Resident

## 2017-12-05 NOTE — DISCHARGE INSTRUCTIONS
HOME DISCHARGE INSTRUCTIONS    Brady Liu / [id number] : 1960    Admission date: 2017 Discharge date: 2017     Please bring this form with you to show your care provider at your follow-up appointment. Primary care provider:  Vicente Johnson MD    Discharging provider:  Nikunj Damon MD  - Family Medicine Resident  Dr. Oliver Vera - Attending, Family Medicine     You have been admitted to the hospital with the following diagnoses:    ACUTE DIAGNOSES:  Chest pain  . . . . . . . . . . . . . . . . . . . . . . . . . . . . . . . . . . . . . . . . . . . . . . . . . . . . . . . . . . . . . . . . . . . . . . . Samira Washington FOLLOW-UP CARE RECOMMENDATIONS:    Appointments  Follow-up Information     Follow up With Details Comments [address], [place] Go on 2017 at 1:00pm [address] [place] [address]  [phone]           Medication Changes  - You may take tylenol 650mg every 6 hours as needed for pain  - Take omeprazole for your heartburn every day and follow up with Dr. Blanquita Gotti. Follow-up tests needed: none    Pending test results: At the time of your discharge the following test results are still pending: Mycoplasma antibodies, throat culture, blood culture, urine culture . Please make sure you review these results with your outpatient follow-up provider(s). Specific symptoms to watch for: chest pain, shortness of breath, fever, chills, nausea, vomiting, diarrhea, change in mentation, falling, weakness, bleeding. DIET/what to eat:  Regular Diet and Diabetic Diet    ACTIVITY:  Activity as tolerated    Wound care: none    Equipment needed:  none    What to do if new or unexpected symptoms occur? If you experience any of the above symptoms (or should other concerns or questions arise after discharge) please call your primary care physician. Return to the emergency room if you cannot get hold of your doctor.     · It is very important that you keep your follow-up appointment(s). · Please bring discharge papers, medication list (and/or medication bottles) to your follow-up appointments for review by your outpatient provider(s). · Please check the list of medications and be sure it includes every medication (even non-prescription medications) that your provider wants you to take. · It is important that you take the medication exactly as they are prescribed. · Keep your medication in the bottles provided by the pharmacist and keep a list of the medication names, dosages, and times to be taken in your wallet. · Do not take other medications without consulting your doctor. · If you have any questions about your medications or other instructions, please talk to your nurse or care provider before you leave the hospital.     Information obtained by:     I understand that if any problems occur once I am at home I am to contact my physician. These instructions were explained to me and I had the opportunity to ask questions. I understand and acknowledge receipt of the instructions indicated above. Physician's or R.N.'s Signature                                                                  Date/Time                                                                                                                                              Patient or Representative Signature                                                          Date/Time        Chest Pain: Care Instructions  Your Care Instructions    There are many things that can cause chest pain. Some are not serious and will get better on their own in a few days. But some kinds of chest pain need more testing and treatment. Your doctor may have recommended a follow-up visit in the next 8 to 12 hours.  If you are not getting better, you may need more tests or treatment. Even though your doctor has released you, you still need to watch for any problems. The doctor carefully checked you, but sometimes problems can develop later. If you have new symptoms or if your symptoms do not get better, get medical care right away. If you have worse or different chest pain or pressure that lasts more than 5 minutes or you passed out (lost consciousness), call 911 or seek other emergency help right away. A medical visit is only one step in your treatment. Even if you feel better, you still need to do what your doctor recommends, such as going to all suggested follow-up appointments and taking medicines exactly as directed. This will help you recover and help prevent future problems. How can you care for yourself at home? · Rest until you feel better. · Take your medicine exactly as prescribed. Call your doctor if you think you are having a problem with your medicine. · Do not drive after taking a prescription pain medicine. When should you call for help? Call 911 if:  ? · You passed out (lost consciousness). ? · You have severe difficulty breathing. ? · You have symptoms of a heart attack. These may include:  ¨ Chest pain or pressure, or a strange feeling in your chest.  ¨ Sweating. ¨ Shortness of breath. ¨ Nausea or vomiting. ¨ Pain, pressure, or a strange feeling in your back, neck, jaw, or upper belly or in one or both shoulders or arms. ¨ Lightheadedness or sudden weakness. ¨ A fast or irregular heartbeat. After you call 911, the  may tell you to chew 1 adult-strength or 2 to 4 low-dose aspirin. Wait for an ambulance. Do not try to drive yourself. ?Call your doctor today if:  ? · You have any trouble breathing. ? · Your chest pain gets worse. ? · You are dizzy or lightheaded, or you feel like you may faint. ? · You are not getting better as expected. ? · You are having new or different chest pain. Where can you learn more?   Go to http://cortez-patricia.info/. Enter A120 in the search box to learn more about \"Chest Pain: Care Instructions. \"  Current as of: March 20, 2017  Content Version: 11.4  © 0241-1279 [employer], [employer]. Care instructions adapted under license by [employer] (which disclaims liability or warranty for this information). If you have questions about a medical condition or this instruction, always ask your healthcare professional. [place] [address] any warranty or liability for your use of this information.

## 2017-12-05 NOTE — ED NOTES
I have reviewed discharge instructions with the patient and caregiver. Reviewed prescriptions, follow-up appointments, and when to return to the ED. The patient and caregiver verbalized understanding. IV's d/c'd with catheter intact from RAC and LAC. Bleeding controlled and bandages applied. To escorted to vehicle by wheelchair. Daughter driving home.

## 2017-12-05 NOTE — H&P
[address] [facility] [address] [place] [address]   Office [phone]  Fax [phone]       Admission H&P     Name: Addie Low MRN: [id number]  Sex: Female   YOB: 1960  Age: 62 y.o. PCP: Alejo Gold MD     Source of Information: patient, medical records    Chief complaint: Chest pain, cough    History of Present Illness  Addie Low is a 62 y.o. female with known hx of CVA, vascular dementia, HTN, DM Type II  who presents to the ER complaining of cough with congestion and postnasal drip. Patient reports that she has had a dry cough and chest pain. The chest pain is sternal location and is described as  burning sensation that is felt most when she is lying flat on her back. She denies any palpitations. She has not tried taking any medication for the pain or the cold symptoms. She denies any myalgias. She has not taken her temperature but does endorse chills. She has about a 60 pack yr hx of smoking and her daughter reports that she has had a chronic cough for a while. She reports having some abdominal pain and nausea yesterday, however [facility] the [facility] prior to arriving here via EMS. [facility] reports that [facility] [employer] and [facility] [employer] and notes that some of the children were sick.          Past Medical History:   Diagnosis Date    Aneurysm (arteriovenous) of coronary vessels     Right side    Chronic gout of multiple sites 10/24/2017    Depression     on Zoloft    Essential hypertension 6/2/2016    Essential hypertension 10/24/2017    Hx of mammogram 05/23/2017    Negative     Routine Papanicolaou smear 5/13/16    Negative ; HPV Negative     Stroke ([facility] [address] 06/03/15      Patient Vitals for the past 12 hrs:   Temp Pulse Resp BP SpO2   12/04/17 1801 - 68 16 113/65 98 %   12/04/17 1629 97.8 °F (36.6 °C) 67 18 132/79 96 %       In the ER, vital signs were remarkable for T 97.8, HR 67, /79, RR 18, 96% on RA . Labs were remarkable for CBC with WBC 17.0 and Hgb 10.7, CMP showed K+ 3.2, Mg 1.6, Troponin <0.04. CXR showed no acute process. Home Medications   Prior to Admission medications    Medication Sig Start Date End Date Taking? Authorizing Provider   donepezil (ARICEPT) 10 mg tablet Take 10 mg by mouth nightly. Yes April Moreau MD   atorvastatin (LIPITOR) 10 mg tablet Take 10 mg by mouth nightly. Yes April Moreau MD   metoprolol tartrate (LOPRESSOR) 25 mg tablet Take 12.5 mg by mouth two (2) times a day. Yes Historical Provider   sertraline (ZOLOFT) 100 mg tablet Take 100 mg by mouth nightly. Yes Historical Provider   urea (CARMOL) 40 % topical cream Apply  to affected area nightly. Apply to feet after shower   Yes Historical Provider   clopidogrel (PLAVIX) 75 mg tab TAKE 1 TAB BY MOUTH DAILY. FOR STROKE PREVENTION. 7/23/17  Yes Kris Jaimes MD   butalbital-acetaminophen-caffeine (FIORICET) -40 mg per tablet 1-2 tabs at onset of moderate headache. May repeat 1 tab in 4 hours if needed. Limit to 3 days a week maximum. 6/8/17  Yes Echo Beauchamp MD   multivitamin (ONE A DAY) tablet Take 1 Tab by mouth daily. Yes Historical Provider       Allergies  Allergies   Allergen Reactions    Namenda [Memantine] Other (comments)     Side effects       Past Medical History:   Diagnosis Date    Aneurysm (arteriovenous) of coronary vessels     Right side    Chronic gout of multiple sites 10/24/2017    Depression     on Zoloft    Essential hypertension 6/2/2016    Essential hypertension 10/24/2017    Hx of mammogram 05/23/2017    Negative     Routine Papanicolaou smear 5/13/16    Negative ; HPV Negative     Stroke ([facility] [address] 06/03/15       Previous Hospitalization(s): None documented       No past surgical history on file.     Family History   Problem Relation Age of Onset    Heart Attack Mother     Heart Attack Father     Headache Sister     Migraines Sister     Stroke Sister     Headache Brother     Migraines Brother        Social History  Social History     Social History    Marital status:      Spouse name: N/A    Number of children: N/A    Years of education: N/A     Occupational History    Not on file. Social History Main Topics    Smoking status: Former Smoker     Quit date: 7/17/2015    Smokeless tobacco: Never Used      Comment: Never used vapor or e-cigs     Alcohol use No    Drug use: No    Sexual activity: Yes     Partners: Male     Birth control/ protection: None     Other Topics Concern    Not on file     Social History Narrative       Alcohol history: Quit 2015, used to drink a six pack in a day prior to quitting   Smoking history: Former smoker, smoked 2 ppd x 30 years, quit 2 years ago  Illicit drug history: Not at all    Living arrangement: patient lives with their family. Ambulates: Independently     Review of Systems  Review of Systems   Constitutional: Positive for chills. Negative for fever. HENT: Positive for congestion and sore throat. Respiratory: Negative for shortness of breath. Cardiovascular: Positive for chest pain. Negative for palpitations. Gastrointestinal: Negative for abdominal pain, constipation, diarrhea and nausea. Physical Exam  Objective:  General Appearance: In no acute distress. Vital signs: (most recent): Blood pressure 125/66, pulse 73, temperature 97.8 °F (36.6 °C), resp. rate 18, height 5' 6\" (1.676 m), weight 115 lb (52.2 kg), SpO2 96 %. Vital signs are normal.  No fever. HEENT: Normal HEENT exam.    Lungs:  Normal respiratory rate and normal effort. Breath sounds clear to auscultation. Heart: Normal rate. Regular rhythm. S1 normal and S2 normal.  No murmur, gallop or friction rub. Chest: Chest wall tenderness (TTP of sternum ) present. Extremities: There is no local swelling. Neurological: Patient is alert and oriented to person, place and time. Abdomen: Abdomen is soft and non-distended. Bowel sounds are normal.   There is no abdominal tenderness. Pupils:  Pupils are equal, round, and reactive to light. O2 Device: Room air     Laboratory Data  Recent Results (from the past 8 hour(s))   URINALYSIS W/ REFLEX CULTURE    Collection Time: 12/04/17  4:49 PM   Result Value Ref Range    Color YELLOW/STRAW      Appearance CLEAR CLEAR      Specific gravity 1.014 1.003 - 1.030      pH (UA) 5.5 5.0 - 8.0      Protein NEGATIVE  NEG mg/dL    Glucose 100 (A) NEG mg/dL    Ketone NEGATIVE  NEG mg/dL    Bilirubin NEGATIVE  NEG      Blood NEGATIVE  NEG      Urobilinogen 0.2 0.2 - 1.0 EU/dL    Nitrites NEGATIVE  NEG      Leukocyte Esterase SMALL (A) NEG      WBC 5-10 0 - 4 /hpf    RBC 0-5 0 - 5 /hpf    Epithelial cells FEW FEW /lpf    Bacteria NEGATIVE  NEG /hpf    UA:UC IF INDICATED URINE CULTURE ORDERED (A) CNI      Hyaline cast 0-2 0 - 5 /lpf   CBC WITH AUTOMATED DIFF    Collection Time: 12/04/17  5:05 PM   Result Value Ref Range    WBC 17.0 (H) 3.6 - 11.0 K/uL    RBC 4.14 3.80 - 5.20 M/uL    HGB 10.7 (L) 11.5 - 16.0 g/dL    HCT 33.4 (L) 35.0 - 47.0 %    MCV 80.7 80.0 - 99.0 FL    MCH 25.8 (L) 26.0 - 34.0 PG    MCHC 32.0 30.0 - 36.5 g/dL    RDW 13.1 11.5 - 14.5 %    PLATELET 784 845 - 187 K/uL    NEUTROPHILS 74 32 - 75 %    LYMPHOCYTES 20 12 - 49 %    MONOCYTES 6 5 - 13 %    EOSINOPHILS 0 0 - 7 %    BASOPHILS 0 0 - 1 %    ABS. NEUTROPHILS 12.5 (H) 1.8 - 8.0 K/UL    ABS. LYMPHOCYTES 3.4 0.8 - 3.5 K/UL    ABS. MONOCYTES 1.0 0.0 - 1.0 K/UL    ABS. EOSINOPHILS 0.1 0.0 - 0.4 K/UL    ABS.  BASOPHILS 0.0 0.0 - 0.1 K/UL   METABOLIC PANEL, BASIC    Collection Time: 12/04/17  5:05 PM   Result Value Ref Range    Sodium 140 136 - 145 mmol/L    Potassium 3.2 (L) 3.5 - 5.1 mmol/L    Chloride 104 97 - 108 mmol/L    CO2 27 21 - 32 mmol/L    Anion gap 9 5 - 15 mmol/L    Glucose 107 (H) 65 - 100 mg/dL    BUN 11 6 - 20 MG/DL    Creatinine 0.90 0.55 - 1.02 MG/DL    BUN/Creatinine ratio 12 12 - 20      GFR est AA >60 >60 ml/min/1.73m2    GFR est non-AA >60 >60 ml/min/1.73m2    Calcium 8.8 8.5 - 10.1 MG/DL   CK W/ REFLX CKMB    Collection Time: 12/04/17  5:05 PM   Result Value Ref Range    CK 72 26 - 192 U/L   MAGNESIUM    Collection Time: 12/04/17  5:05 PM   Result Value Ref Range    Magnesium 1.6 1.6 - 2.4 mg/dL   NT-PRO BNP    Collection Time: 12/04/17  5:05 PM   Result Value Ref Range    NT pro- (H) 0 - 125 PG/ML   TROPONIN I    Collection Time: 12/04/17  5:05 PM   Result Value Ref Range    Troponin-I, Qt. <0.04 <0.05 ng/mL   LACTIC ACID    Collection Time: 12/04/17  5:05 PM   Result Value Ref Range    Lactic acid 1.8 0.4 - 2.0 MMOL/L       Imaging  CXR Results  (Last 48 hours)               12/04/17 1648  XR CHEST PA LAT Final result    Impression:  IMPRESSION: No acute cardiopulmonary disease. Narrative: Indication: Chest pain, cough. Exam: PA and lateral views of the chest.       There is no prior study for direct comparison. Findings: Cardiomediastinal silhouette is within normal limits. Lungs are clear   bilaterally. Pleural spaces are normal. Osseous structures are intact. CT Results  (Last 48 hours)    None          EKG: normal sinus rhythm, diffuse T-wave inversions present, previous EKG not available      Assessment and Plan     Steven Gaytan is a 62 y.o. female who is admitted for ACS rule out. ACS rule out: EKG with t-wave inversions, however troponins negative X2. Burning quality of CP and reproducibility on physical exam lean towards non-cardiac etiology.     -Admit to telemetry  -EKG: sinus rhythm with T-wave inversions    -Trend troponins: Trops neg X2  -  -TSH wnL at 1.10  -Consult cardiology: Patient has been NPO since midnight for any potential procedure.   -Trial of GI cocktail due to burning quality of CP  -NTG PRN pain    Cough with sore throat: Patient has chronic dry cough that has intensified since yesterday.  She does endorse postnasal drip and sore throat, however HEENT exam is benign.   -tessalon pearls and lozenges PRN      Leukocytosis: Unclear etiology given viral nature of clinical symptoms. Patient afebrile at admission.  -Microbial labs: Influenza A&B, Rapid strep, Legionella, Strep pneumo, Mycoplasma, HIV  -Monitor WBC on daily CBC   -repeat CXR in AM  -Blood cx, urine cx     Hx of CVA: Patient is on plavix 75mg daily.   -continue home plavix     Vascular dementia: Patient has baseline memory impairment according to daughter.  - Continue home aricept 10mg daily. HTN: BP on admission 132/79  -continue home metoprolol 12.5mg BID  -continue to monitor and readjust as BPs trend     Depression: Well controlled on home zoloft 100mg dailly    HLD: Last lipid panel 4/15/17 and showed total cholesterol 102, TG 85, HDL 48, LDL 37  -continue home Lipitor 10 mg daily     Diabetes Mellitus T2: Patient's daughter reports she was taken off of metformin by PCP due to her most recent A1c being low. - Last HgA1c 6.6 on 9/12/17  - Insulin Sliding Scale normal sensitivity with AC&HS glucose checks. - Hypoglycemia protocols ordered. FEN/GI - NPO after midnight. NS at 75 mL/hr. Activity - Ambulate with assistance    DVT prophylaxis - Lovenox  GI prophylaxis - Not indicated at this time  Fall prophylaxis - Fall precautions ordered. Disposition - Admit to Telemetry. Plan to d/c to TBD. Code Status - Full, discussed with patient / caregivers.     Patient Derrell Links will be discussed Dr. Damon Whitmore.    8:21 PM, 12/04/17  Fransisco Colunga MD  Family Medicine Resident       For Billing    Chief Complaint   Patient presents with    Chest Pain   [facility] Problems  Date Reviewed: 10/24/2017          Codes Class Noted POA    Chest pain ICD-10-CM: R07.9  ICD-9-CM: 786.50  12/4/2017 Unknown

## 2017-12-05 NOTE — ED NOTES
Bedside and Verbal shift change report given to Rosalie Baez RN and Jordy Germain RN (oncoming nurse) by Wang Hamilton RN (offgoing nurse). Report included the following information SBAR, Kardex, ED Summary, Procedure Summary, MAR, Recent Results, Med Rec Status and Cardiac Rhythm Sinus Tachycardia.

## 2017-12-05 NOTE — PROGRESS NOTES
12/5/17  CM spoke with pt to complete initial assessment and discuss dc plans. Pt lives in apartment with her 2 daughters. Pt explained that one daughter is \"in and out\" and her other daughter Bhanu Fay [phone]) is her primary caretaker. Pt has been independent with basic adls but Austyn Pop assists with meal preparation, med management, transportation and bathing. Pt has no dmes. She has home health PT in the past with [facility] and would like to use again if home health is needed. PCP is Dr Goldie Zhong and she gets meds filled at [facility] in [place]. No discharge needs are anticipated but CM will follow and assist as needed. Toshia Stewart LCSW  Care Management Interventions  PCP Verified by CM:  Yes  Transition of Care Consult (CM Consult): Discharge Planning  Current Support Network: Lives with Caregiver  Confirm Follow Up Transport: Family  Plan discussed with Pt/Family/Caregiver: Yes  Freedom of Choice Offered: Yes  Discharge Location  Discharge Placement: Home

## 2017-12-05 NOTE — ED NOTES
Bedside and verbal report given to [facility], RN, who will assume care of patient at this time.

## 2017-12-05 NOTE — CDMP QUERY
The admitting diagnosis in the progress notes for this patient indicates unspecified chest pain.    Please review the following list of potential diagnoses and If known, please clarify the underlying cause. Note: For hospital admissions, the final diagnosis may be presumptive. Terms such as probable, likely or suspected may be used.  Cardiac arrhythmia (please specify the type, if known)   CAD (specify with or w/o unstable angina)   GERD   Costochondritis   Pleuritic   Psychogenic causes   Stress and /or anxiety   Other Cause   Chest pain, cause undetermined    Please document in your progress  notes and discharge summary. Please clarify and document your clinical opinion in the progress notes and discharge summary including the definitive and/or presumptive diagnosis, (suspected or probable), related to the above clinical findings. Please include clinical findings supporting your diagnosis.       Thank you for your time   [facility] RN/BSN, [address]  Desk:   [phone]   Other:  [phone]

## 2017-12-05 NOTE — ED NOTES
Bedside shift change report given to Sam ECKERT (oncoming nurse) by [facility] (offgoing nurse). Report included the following information SBAR, ED Summary, MAR and Recent Results. Pt on cardiac monitor, placed in hospital bed> pt tolerated 100% of meal tray as well as PM prescribed meds. Daughter at bedside. Bed locked and lowered. Belongings including LLE brace at bedside. Call bell in reach. Pt denies CP and SOB at present.  BS CTA

## 2017-12-05 NOTE — PROGRESS NOTES
[address] [facility] [address] [address]   Office [phone]  Fax [phone]          Assessment and Plan     Suma Krueger is a 62 y.o. female admitted for ACS rule out. She has spent 1 night(s) in the hospital.    24 Hour Events: No acute events. ACS rule out: EKG with t-wave inversions, however troponins negative X2. Burning quality of CP and reproducibility on physical exam lean towards non-cardiac etiology.     -Admit to telemetry  -EKG: sinus rhythm with T-wave inversions    -Trend troponins: Trops neg X2  -  -TSH wnL at 1.10  -Consult cardiology: Patient was NPO at midnight for any potential procedure.   -Trial of GI cocktail due to burning quality of CP. Patient seems to have responded to it. -NTG PRN pain     Cough with sore throat: Patient has chronic dry cough that she reports intensified yesterday. She does endorse postnasal drip and sore throat, however HEENT exam is benign.   -tessalon pearls and lozenges PRN   -Patient reports that cough has resolved after cough medicine       Leukocytosis: Unclear etiology given viral nature of clinical symptoms. Patient afebrile at admission.  -Microbial labs: Influenza A&B, Rapid strep, Legionella, Strep pneumo, Mycoplasma, HIV  -Monitor WBC on daily CBC   -repeat CXR in AM  -Blood cx, urine cx      Hx of CVA: Patient is on plavix 75mg daily.   -continue home plavix      Vascular dementia: Patient has baseline memory impairment according to daughter.  - Continue home aricept 10mg daily.      HTN: BP on admission 132/79  -continue home metoprolol 12.5mg BID  -continue to monitor and readjust as BPs trend      Depression: Well controlled on home zoloft 100mg dailly     HLD: Last lipid panel 4/15/17 and showed total cholesterol 102, TG 85, HDL 48, LDL 37  -continue home Lipitor 10 mg daily      Diabetes Mellitus T2: Patient's daughter reports she was taken off of metformin by PCP due to her most recent A1c being low.    - Last HgA1c 6.6 on 17  - Insulin Sliding Scale normal sensitivity with AC&HS glucose checks. - Hypoglycemia protocols ordered.           FEN/GI - NPO after midnight. NS at 75 mL/hr. Activity - Ambulate with assistance    DVT prophylaxis - Lovenox  GI prophylaxis - Not indicated at this time  Fall prophylaxis - Fall precautions ordered. Disposition - Admit to Telemetry. Plan to d/c to TBD. Code Status - Full, discussed with patient / caregivers.     Patient Cate Flannery will be discussed with Dr. Calderon Angeles.    I appreciate the opportunity to participate in the care of this patient,  Lon Suarez MD  [facility] Medicine Resident         Subjective / Objective     Subjective Patient reports not having any CP this morning. Denies any cough after getting cough medicine. Still has some chills but denies subjective fever. Denies any abdominal pain or n/v/d/c. Temp (24hrs), Av.8 °F (36.6 °C), Min:97.8 °F (36.6 °C), Max:97.8 °F (36.6 °C)     Objective:  General Appearance: In no acute distress (appears cachectic ). Vital signs: (most recent): Blood pressure (!) 127/91, pulse 93, temperature 97.8 °F (36.6 °C), resp. rate 21, height 5' 6\" (1.676 m), weight 115 lb (52.2 kg), SpO2 98 %. Vital signs are normal.    HEENT: Normal HEENT exam.    Lungs:  Normal respiratory rate and normal effort. Breath sounds clear to auscultation. Heart: Normal rate. Regular rhythm. S1 normal and S2 normal.  No murmur, gallop or friction rub. Neurological: Patient is alert and oriented to person, place and time. Abdomen: Abdomen is soft and non-distended. Bowel sounds are normal.   There is no abdominal tenderness. Pupils:  Pupils are equal, round, and reactive to light.       Respiratory:   O2 Device: Room air     I/O:       CBC:  Recent Labs      17   170   WBC  12.5*  17.0*   HGB  9.1*  10.7*   HCT  28.8*  33.4*   PLT  229  216       Metabolic Panel:  Recent Labs      17   1705 NA  145  140   K  3.7  3.2*   CL  111*  104   CO2  28  27   BUN  9  11   CREA  0.70  0.90   GLU  121*  107*   CA  8.0*  8.8   MG  2.2  1.6   PHOS  2.2*   --    ALB  2.6*   --    SGOT  31   --    ALT  47   --           For Billing    Chief Complaint   Patient presents with    Chest Pain    Cough       Hospital Problems  Date Reviewed: 10/24/2017          Codes Class Noted POA    Chest pain ICD-10-CM: R07.9  ICD-9-CM: 786.50  12/4/2017 Unknown

## 2017-12-06 ENCOUNTER — PATIENT OUTREACH (OUTPATIENT)
Dept: FAMILY MEDICINE CLINIC | Age: 57
End: 2017-12-06

## 2017-12-06 LAB
BACTERIA SPEC CULT: NORMAL
CC UR VC: NORMAL
HIV 1+2 AB+HIV1 P24 AG SERPL QL IA: NONREACTIVE
HIV12 RESULT COMMENT, HHIVC: NORMAL
SERVICE CMNT-IMP: NORMAL

## 2017-12-06 NOTE — PROGRESS NOTES
[facility]  ED  Discharge Follow-Up        Patient listed on discharge [facility]) report on 12/5/17. Patient discharged from [facility] for Chest Pain. Panel Manager contacted the patient by telephone to perform post ED discharge assessment. No answer, message left requesting return call. Will continue to reach out to patient regarding ED visit.

## 2017-12-07 LAB
BACTERIA SPEC CULT: NORMAL
M PNEUMO IGG SER IA-ACNC: 544 U/ML (ref 0–99)
M PNEUMO IGM SER IA-ACNC: <770 U/ML (ref 0–769)
SERVICE CMNT-IMP: NORMAL

## 2017-12-09 LAB
BACTERIA SPEC CULT: NORMAL
INFLUENZA A AB, IGG 828524: 1.16 IV
INFLUENZA A VIRUS IGM: 0.04 IV
INFLUENZA B VIRUS AB, IGG: 1.68 IV
INFLUENZA B VIRUS AB, IGM: 0.02 IV
SERVICE CMNT-IMP: NORMAL

## 2017-12-11 ENCOUNTER — OFFICE VISIT (OUTPATIENT)
Dept: NEUROLOGY | Age: 57
End: 2017-12-11

## 2017-12-11 ENCOUNTER — OFFICE VISIT (OUTPATIENT)
Dept: FAMILY MEDICINE CLINIC | Age: 57
End: 2017-12-11

## 2017-12-11 VITALS
OXYGEN SATURATION: 93 % | SYSTOLIC BLOOD PRESSURE: 116 MMHG | WEIGHT: 113 LBS | DIASTOLIC BLOOD PRESSURE: 70 MMHG | HEART RATE: 125 BPM | BODY MASS INDEX: 18.16 KG/M2 | HEIGHT: 66 IN

## 2017-12-11 VITALS
DIASTOLIC BLOOD PRESSURE: 59 MMHG | WEIGHT: 112 LBS | HEART RATE: 74 BPM | HEIGHT: 66 IN | SYSTOLIC BLOOD PRESSURE: 125 MMHG | BODY MASS INDEX: 18 KG/M2 | TEMPERATURE: 97.9 F | RESPIRATION RATE: 18 BRPM

## 2017-12-11 DIAGNOSIS — G44.221 CHRONIC TENSION-TYPE HEADACHE, INTRACTABLE: ICD-10-CM

## 2017-12-11 DIAGNOSIS — E11.9 TYPE 2 DIABETES MELLITUS WITHOUT COMPLICATION, WITHOUT LONG-TERM CURRENT USE OF INSULIN (HCC): ICD-10-CM

## 2017-12-11 DIAGNOSIS — Z13.5 GLAUCOMA SCREENING: ICD-10-CM

## 2017-12-11 DIAGNOSIS — G81.14 LEFT SPASTIC HEMIPARESIS (HCC): ICD-10-CM

## 2017-12-11 DIAGNOSIS — G43.019 INTRACTABLE MIGRAINE WITHOUT AURA AND WITHOUT STATUS MIGRAINOSUS: ICD-10-CM

## 2017-12-11 DIAGNOSIS — F32.1 MODERATE SINGLE CURRENT EPISODE OF MAJOR DEPRESSIVE DISORDER (HCC): ICD-10-CM

## 2017-12-11 DIAGNOSIS — F01.50 VASCULAR DEMENTIA WITHOUT BEHAVIORAL DISTURBANCE (HCC): Primary | ICD-10-CM

## 2017-12-11 DIAGNOSIS — I10 ESSENTIAL HYPERTENSION: ICD-10-CM

## 2017-12-11 DIAGNOSIS — K21.9 GASTROESOPHAGEAL REFLUX DISEASE, ESOPHAGITIS PRESENCE NOT SPECIFIED: ICD-10-CM

## 2017-12-11 DIAGNOSIS — R07.89 ATYPICAL CHEST PAIN: Primary | ICD-10-CM

## 2017-12-11 RX ORDER — PROPRANOLOL HYDROCHLORIDE 80 MG/1
80 CAPSULE, EXTENDED RELEASE ORAL DAILY
Qty: 30 CAP | Refills: 1 | Status: SHIPPED | OUTPATIENT
Start: 2017-12-11 | End: 2018-02-03 | Stop reason: SDUPTHER

## 2017-12-11 RX ORDER — PROPRANOLOL HYDROCHLORIDE 40 MG/1
40 TABLET ORAL
Qty: 30 TAB | Refills: 5 | Status: SHIPPED | OUTPATIENT
Start: 2017-12-11 | End: 2017-12-11 | Stop reason: ALTCHOICE

## 2017-12-11 RX ORDER — SERTRALINE HYDROCHLORIDE 100 MG/1
150 TABLET, FILM COATED ORAL DAILY
Qty: 90 TAB | Refills: 5 | Status: SHIPPED | OUTPATIENT
Start: 2017-12-11

## 2017-12-11 RX ORDER — PHENOL/SODIUM PHENOLATE
20 AEROSOL, SPRAY (ML) MUCOUS MEMBRANE DAILY
Qty: 30 TAB | Refills: 2 | Status: SHIPPED | OUTPATIENT
Start: 2017-12-11 | End: 2018-02-06

## 2017-12-11 NOTE — MR AVS SNAPSHOT
Visit Information Date & Time Provider Department Dept. Phone Encounter #  
 12/11/2017  2:30 PM Kris Jaimes MD [address] [id number] Your Appointments 1/24/2018  9:30 AM  
ROUTINE CARE with Kris Jaimes MD  
[address] [facility]) Appt Note: 3 month follow up HTN  
 [address] [facility] [address]  
[phone]  
  
   
 [address] [facility] [address] [address]  
  
    
 4/9/2018  8:40 AM  
Follow Up with Echo Beauchamp MD  
[facility]) Appt Note: follow up dementia/headache [place] [address] [facility] [address] [address] [phone]  
  
   
 [place] [address] [address] [address] Upcoming Health Maintenance Date Due Pneumococcal 19-64 Highest Risk (1 of 3 - PCV13) 4/24/1979 MICROALBUMIN Q1 7/14/2017 EYE EXAM RETINAL OR DILATED Q1 12/13/2017 FOOT EXAM Q1 1/17/2018 HEMOGLOBIN A1C Q6M 3/12/2018 LIPID PANEL Q1 4/14/2018 BREAST CANCER SCRN MAMMOGRAM 5/23/2018 PAP AKA CERVICAL CYTOLOGY 5/13/2021 COLONOSCOPY 11/12/2025 DTaP/Tdap/Td series (2 - Td) 12/14/2026 Allergies as of 12/11/2017  Review Complete On: 12/11/2017 By: Kris Jaimes MD  
  
 Severity Noted Reaction Type Reactions Namenda [Memantine]  11/17/2016    Other (comments) Side effects Current Immunizations  Reviewed on 12/14/2016 No immunizations on file. Not reviewed this visit You Were Diagnosed With   
  
 Codes Comments Atypical chest pain    -  Primary ICD-10-CM: R07.89 ICD-9-CM: 786.59 Gastroesophageal reflux disease, esophagitis presence not specified     ICD-10-CM: K21.9 ICD-9-CM: 530.81 Essential hypertension     ICD-10-CM: I10 
ICD-9-CM: 401.9 Chronic tension-type headache, intractable     ICD-10-CM: M31.934 ICD-9-CM: 339.12   
 Type 2 diabetes mellitus without complication, without long-term current use of insulin (HCC)     ICD-10-CM: E11.9 ICD-9-CM: 250.00 Glaucoma screening     ICD-10-CM: Z13.5 ICD-9-CM: V80.1 Vitals BP Pulse Temp Resp Height(growth percentile) Weight(growth percentile) 125/59 74 97.9 °F (36.6 °C) (Oral) 18 5' 6\" (1.676 m) 112 lb (50.8 kg) BMI OB Status Smoking Status 18.08 kg/m2 Postmenopausal Former Smoker Vitals History BMI and BSA Data Body Mass Index Body Surface Area 18.08 kg/m 2 1.54 m 2 Preferred Pharmacy Pharmacy Name Phone [id number]- [address] AT [place] [address] [phone] Your Updated Medication List  
  
   
This list is accurate as of: 12/11/17  3:28 PM.  Always use your most recent med list.  
  
  
  
  
 acetaminophen 325 mg tablet Commonly known as:  TYLENOL Take 2 Tabs by mouth every six (6) hours as needed for Pain. atorvastatin 10 mg tablet Commonly known as:  LIPITOR Take 10 mg by mouth nightly. clopidogrel 75 mg Tab Commonly known as:  PLAVIX TAKE 1 TAB BY MOUTH DAILY. FOR STROKE PREVENTION. donepezil 10 mg tablet Commonly known as:  ARICEPT Take 10 mg by mouth nightly. quxmnpa-kawbrkchk-wlvwmrbsifmd -325 mg capsule Commonly known as:  MIDRIN  
1-2 caps at onset of migraine. May repeat 1 tab in 4 hours if needed. Limit: max 3 days a week. multivitamin tablet Commonly known as:  ONE A DAY Take 1 Tab by mouth daily. Omeprazole delayed release 20 mg tablet Commonly known as:  PRILOSEC D/R Take 1 Tab by mouth daily for 90 days. propranolol LA 80 mg SR capsule Commonly known as:  INDERAL LA Take 1 Cap by mouth daily. sertraline 100 mg tablet Commonly known as:  ZOLOFT Take 1.5 Tabs by mouth daily. For depression. urea 40 % topical cream  
Commonly known as:  CARMOL Apply  to affected area nightly. Apply to feet after shower Prescriptions Sent to Pharmacy Refills Omeprazole delayed release (PRILOSEC D/R) 20 mg tablet 2 Sig: Take 1 Tab by mouth daily for 90 days. Class: Normal  
 Pharmacy: [facility]/pharmacy #[id number], [address] [facility] Ph #: [phone] Route: Oral  
 propranolol LA (INDERAL LA) 80 mg SR capsule 1 Sig: Take 1 Cap by mouth daily. Class: Normal  
 Pharmacy: [facility]/pharmacy #[id number], [address] [facility] Ph #: [phone] Route: Oral  
  
We Performed the Following MICROALBUMIN, UR, RAND W/ MICROALBUMIN/CREA RATIO [id number] CPT(R)] REFERRAL TO OPHTHALMOLOGY [REF57 Custom] Comments:  
 Diabetic eye exam  
  
Referral Information Referral ID Referred By Referred To  
  
 [id number] Luis Keller Not Available Visits Status Start Date End Date 1 New Request 12/11/17 12/11/18 If your referral has a status of pending review or denied, additional information will be sent to support the outcome of this decision. Introducing hospitals & HEALTH SERVICES! Dear Adi Alberto: Thank you for requesting a [employer] account. Our records indicate that you already have an active [employer] account. You can access your account anytime at [email or web address] Did you know that you can access your hospital and ER discharge instructions at any time in [employer]? You can also review all of your test results from your hospital stay or ER visit. Additional Information If you have questions, please visit the Frequently Asked Questions section of the [employer] website at [email or web address] Remember, [employer] is NOT to be used for urgent needs. For medical emergencies, dial 911. Now available from your iPhone and Android! Please provide this summary of care documentation to your next provider. Your primary care clinician is listed as Amna Ansari. If you have any questions after today's visit, please call [phone].

## 2017-12-11 NOTE — PROGRESS NOTES
HISTORY OF PRESENT ILLNESS  Chico Simmons is a 62 y.o. female. HPI Comments: Chico Simmons is here for hospital follow up after being admitted 12/4/17 - 12/15/17 for chest pain. Cardiology was consulted and it was felt to be atypical, so no cardiac testing was done. Due to the burning nature of the pain, Prilosec was started. Due to the dry cough, sore throat and leukocytosis to 17, respiratory studies, urine and blood cultures were done. These were unremarkable. Also, she saw neurology today who wanted her to start propranolol for her headaches is it was OK to stop the metoprolol she was taking. Hospital Follow Up   The history is provided by the patient (see comments). Pertinent negatives include no chest pain, no abdominal pain and no shortness of breath. Chest Pain (Angina)    The history is provided by the patient and medical records. This is a new problem. Episode onset: about a week ago. The problem has been resolved. The quality of the pain is described as burning. Associated symptoms include malaise/fatigue. Pertinent negatives include no abdominal pain, no dizziness, no nausea, no shortness of breath and no vomiting. Treatments tried: Prilosec. The treatment provided significant relief. Review of Systems   Constitutional: Positive for malaise/fatigue. Respiratory: Negative for shortness of breath. Cardiovascular: Negative for chest pain. Gastrointestinal: Negative for abdominal pain, heartburn, nausea and vomiting. Neurological: Negative for dizziness. Visit Vitals    /59    Pulse 74    Temp 97.9 °F (36.6 °C) (Oral)    Resp 18    Ht 5' 6\" (1.676 m)    Wt 112 lb (50.8 kg)    BMI 18.08 kg/m2     Physical Exam   Constitutional: She is oriented to person, place, and time. She appears well-developed and well-nourished. No distress. Cardiovascular: Normal rate, regular rhythm and normal heart sounds. Exam reveals no gallop and no friction rub.     No murmur heard.  Pulmonary/Chest: Effort normal and breath sounds normal. No respiratory distress. She has no wheezes. She has no rales. Abdominal: Soft. Normal appearance and bowel sounds are normal. She exhibits no distension. There is no hepatosplenomegaly. There is no tenderness. There is no rebound and no guarding. Musculoskeletal: She exhibits no edema. Neurological: She is alert and oriented to person, place, and time. Skin: Skin is warm and dry. She is not diaphoretic. Nursing note and vitals reviewed. ASSESSMENT and PLAN    ICD-10-CM ICD-9-CM    1. Atypical chest pain R07.89 786.59    2. Gastroesophageal reflux disease, esophagitis presence not specified K21.9 530.81 Omeprazole delayed release (PRILOSEC D/R) 20 mg tablet   3. Essential hypertension I10 401.9 propranolol LA (INDERAL LA) 80 mg SR capsule   4. Chronic tension-type headache, intractable G44.221 339.12 propranolol LA (INDERAL LA) 80 mg SR capsule   5. Type 2 diabetes mellitus without complication, without long-term current use of insulin ([facility]) E11.9 250.00 REFERRAL TO OPHTHALMOLOGY      MICROALBUMIN, UR, RAND W/ MICROALBUMIN/CREA RATIO   6. Glaucoma screening Z13.5 V80.1 REFERRAL TO OPHTHALMOLOGY        Chest pain due to gastroesophageal reflux disease, both resolved  Blood pressure controlled, on metoprolol  Neurology would like to switch patient to propranolol for headaches  Continue Prilosec  Stop metoprolol  Start propranolol  Labs per orders. Eye exam     Follow-up Disposition:  Return in about 6 weeks (around 1/22/2018) for blood pressure. Reviewed plan of care. Patient has provided input and agrees with goals.

## 2017-12-11 NOTE — PROGRESS NOTES
Chief Complaint   Patient presents with   [address] [facility] Follow Up     chest pain; [address] [place];      1. Have you been to the ER, urgent care clinic since your last visit? Yes 12/04/17 [place] chest pain Hospitalized since your last visit? Yes [facility] [place] 12/04/17 for chest pain    2. Have you seen or consulted any other health care providers outside of the [address] [place] [address] since your last visit? Include any pap smears or colon screening.  No

## 2017-12-11 NOTE — PROGRESS NOTES
Interval HPI:   This is a 62 y.o. 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  No new stroke or TIA symptoms. Continues taking  Plavix 75 mg/ day, Lipitor 10 mg QHS. Reports that DM med was stopped bcz pt was getting hypoglycemic. Pt recently went to ER for chest pain, had brief admission, no MI, seen by cardiology, started on metoprolol. 2) Mixed alzheimer's-vascular dementia  Cognitive difficulty started after stroke. No noticeable worsening in memory compared to last visit per pt/ daughter. Continues on Aricept 10 mg QHS (couldn't tolerate Namenda)     3) Depression  Pt says mood is about same as last visit, but she's reluctant to quantify (mild-moderate-severe). Daughter says pt is more withdrawn than last visit, doesn't go to [employer] or out with friends very much, not doing her home exercises. Last visit we increased her Zoloft from 50 mg/ day to 100 mg/ day. 4) Chronic tension headaches, episodic migraine without aura  Last visit pt was complaining of near daily headaches, increased Zoloft thinking it might help. No change in headache frequency and daughter says patient sometimes has severe headaches where she feels nauseated. Daughter giving her Fioricet 3 days a week (can't take triptans d/t hx of stroke). Insurance company sent a letter saying they will no longer cover Fioricet for pt's headaches. 5) Misc: received a letter from [employer] regarding [employer].   Neuorpsychology testing in April 2016 noted patient was competent but needed assistance making higher level decisions such as medical care and financial issues so recommended she assign a POA.      =================  Brief Hx: Right MCA territory infarction in June 2015, treated at [facility], resultant mild left hemiparesis/ spastic gait. CTA H+N: no aneurysm, mild/ minimal ICA stenosis, right MCA narrowing consistent with prior infarction. Seen by Neuropsychology/ Dr. Jonna Scott. Dx with mild-moderate dementia (vascular and/or alzheimer's), mild depression. Brief ROS: as above or otherwise negative  There have been no significant changes in PMHx, PSHx, SHx except as noted above. Allergies   Allergen Reactions    Namenda [Memantine] Other (comments)     Side effects     Current Outpatient Prescriptions   Medication Sig Dispense Refill    sertraline (ZOLOFT) 100 mg tablet Take 1.5 Tabs by mouth daily. For depression. 90 Tab 5    propranolol (INDERAL) 40 mg tablet Take 1 Tab by mouth nightly. BP med to reduce headache frequency 30 Tab 5    eqgzwja-appoazcsp-blahmltebcck (MIDRIN) -325 mg capsule 1-2 caps at onset of migraine. May repeat 1 tab in 4 hours if needed. Limit: max 3 days a week. 36 Cap 2    Omeprazole delayed release (PRILOSEC D/R) 20 mg tablet Take 1 Tab by mouth daily. 30 Tab 0    donepezil (ARICEPT) 10 mg tablet Take 10 mg by mouth nightly.  atorvastatin (LIPITOR) 10 mg tablet Take 10 mg by mouth nightly.  metoprolol tartrate (LOPRESSOR) 25 mg tablet Take 12.5 mg by mouth two (2) times a day.  urea (CARMOL) 40 % topical cream Apply  to affected area nightly. Apply to feet after shower      clopidogrel (PLAVIX) 75 mg tab TAKE 1 TAB BY MOUTH DAILY. FOR STROKE PREVENTION. 90 Tab 3    multivitamin (ONE A DAY) tablet Take 1 Tab by mouth daily.  acetaminophen (TYLENOL) 325 mg tablet Take 2 Tabs by mouth every six (6) hours as needed for Pain. 30 Tab 0       Physical Exam  Blood pressure 116/70, pulse (!) 125, height 5' 6\" (1.676 m), weight 51.3 kg (113 lb), SpO2 93 %.     No acute distress, resting, affect is flat, says very little unless prompted  Neck: no stiffness  Skin: no rashes    Focused Neurological Exam     Mental status: Alert and oriented to person, place situation. Language: normal fluency and comprehension; no dysarthria. CNs:   Visual fields grossly normal  Extraocular movements intact, no nystagmus  Face appears symmetric and facial strength normal.    Hearing is intact to casual conversation. Sensory: not tested today    Motor:  Left arm and le/ 5  Right arm and le/ 5  Left side spasticity    Reflexes: increased on left compared to right  Gait: spastic left hemparesis    Impression      ICD-10-CM ICD-9-CM    1. Vascular dementia without behavioral disturbance F01.50 290.40    2. Chronic tension-type headache, intractable G44.221 339.12 propranolol (INDERAL) 40 mg tablet      gwqlhlr-cdbrhjfsh-wdermslwkvtz (MIDRIN) -325 mg capsule   3. Intractable migraine without aura and without status migrainosus G43.019 346.11    4. Moderate single current episode of major depressive disorder ([facility]) F32.1 296.22 sertraline (ZOLOFT) 100 mg tablet   5. Left spastic hemiparesis ([facility]) G81.14 342.10 REFERRAL TO PHYSIATRY        Mixed mild-moderate vascular-alzheimer's dementia by cognitive testing  Chronic tension headache  Depression (mild to moderate)  Left sided spasticity    Continue Aricept 10 mg QHS  (didn't tolerate Namenda). Increased Zoloft 100 mg tab to 1.5 tabs QAM for depression and tension headaches. Discussed trying Propranolol 40 mg QHS for headache prevention but patient was recently started on Metoprolol 25 mg BID for chest pain. Gave her Rx for Propranolol but advised her (wrote on Rx) not to start it until she talked with her PCP or Cardiologist (to decide which to take, metoprolol or propranolol). Common SEFx discussed. Insurance denying Fioricet now. Pt given Rx for midrin to use up to 3 days a week for moderate to severe headaches (can't take triptans d/t hx of stroke).   Referred to Physiatry at [address] [place] [address] to assess left sided spasticity, consideration of botox in left arm. Filled out Representative Payee paperwork. Pt also reported snoring. Advised her that if headaches and sleep didn't improve with going up on Zoloft and being on Betablocker (metoprolol or propranolol), she should ask PCP for referral to Sleep Clinic/ Sleep Study to eval for ALMA. Pt/ daughter expressed understanding.      F/u in 4 months    Cc: PCP (Dr Francie Pope) and Cardiology (Dr Pedro James)      Signed By: Ne Bonner MD     December 11, 2017

## 2017-12-11 NOTE — MR AVS SNAPSHOT
Visit Information Date & Time Provider Department Dept. Phone Encounter #  
 12/11/2017  8:40 AM Yimi Rsos MD [facility] Neurology [facility] [phone] [id number] Follow-up Instructions Return in about 4 months (around 4/11/2018). Your Appointments 12/11/2017  2:30 PM  
ROUTINE CARE with Naima Bolton MD  
[address] [facility]) Appt Note: Hospital Follow up- Chest Pain; Hospital Follow up- Chest Pain [address] [facility] [address]  
[phone]  
  
   
 [address] [facility]. [address] [place] [address]  
  
    
 1/24/2018  9:30 AM  
ROUTINE CARE with Naima Bolton MD  
[address] [facility]) Appt Note: 3 month follow up HTN  
 [address]  
[phone] Upcoming Health Maintenance Date Due Pneumococcal 19-64 Highest Risk (1 of 3 - PCV13) 4/24/1979 MICROALBUMIN Q1 7/14/2017 EYE EXAM RETINAL OR DILATED Q1 12/13/2017 FOOT EXAM Q1 1/17/2018 HEMOGLOBIN A1C Q6M 3/12/2018 LIPID PANEL Q1 4/14/2018 BREAST CANCER SCRN MAMMOGRAM 5/23/2018 PAP AKA CERVICAL CYTOLOGY 5/13/2021 COLONOSCOPY 11/12/2025 DTaP/Tdap/Td series (2 - Td) 12/14/2026 Allergies as of 12/11/2017  Review Complete On: 12/11/2017 By: Ivonne Altamirano LPN Severity Noted Reaction Type Reactions Namenda [Memantine]  11/17/2016    Other (comments) Side effects Current Immunizations  Reviewed on 12/14/2016 No immunizations on file. Not reviewed this visit You Were Diagnosed With   
  
 Codes Comments Left spastic hemiparesis (HCC)    -  Primary ICD-10-CM: G81.14 
ICD-9-CM: 342.10 Chronic tension-type headache, intractable     ICD-10-CM: H86.206 ICD-9-CM: 339.12 Vascular dementia without behavioral disturbance     ICD-10-CM: F01.50 ICD-9-CM: 290.40 Moderate single current episode of major depressive disorder (HCC)     ICD-10-CM: F32.1 ICD-9-CM: 296.22 Vitals BP Pulse Height(growth percentile) Weight(growth percentile) SpO2 BMI  
 116/70 (BP 1 Location: Left arm, BP Patient Position: Sitting) (!) 125 5' 6\" (1.676 m) 113 lb (51.3 kg) 93% 18.24 kg/m2 OB Status Smoking Status Postmenopausal Former Smoker BMI and BSA Data Body Mass Index Body Surface Area  
 18.24 kg/m 2 1.55 m 2 Preferred Pharmacy Pharmacy Name Phone [id number]- [address] AT [place] [address] [phone] Your Updated Medication List  
  
   
This list is accurate as of: 12/11/17  9:29 AM.  Always use your most recent med list.  
  
  
  
  
 acetaminophen 325 mg tablet Commonly known as:  TYLENOL Take 2 Tabs by mouth every six (6) hours as needed for Pain. atorvastatin 10 mg tablet Commonly known as:  LIPITOR Take 10 mg by mouth nightly. butalbital-acetaminophen-caffeine -40 mg per tablet Commonly known as:  FIORICET  
1-2 tabs at onset of moderate headache. May repeat 1 tab in 4 hours if needed. Limit to 3 days a week maximum. clopidogrel 75 mg Tab Commonly known as:  PLAVIX TAKE 1 TAB BY MOUTH DAILY. FOR STROKE PREVENTION. donepezil 10 mg tablet Commonly known as:  ARICEPT Take 10 mg by mouth nightly. metoprolol tartrate 25 mg tablet Commonly known as:  LOPRESSOR Take 12.5 mg by mouth two (2) times a day. multivitamin tablet Commonly known as:  ONE A DAY Take 1 Tab by mouth daily. Omeprazole delayed release 20 mg tablet Commonly known as:  PRILOSEC D/R Take 1 Tab by mouth daily. propranolol 40 mg tablet Commonly known as:  INDERAL Take 1 Tab by mouth nightly. BP med to reduce headache frequency  
  
 sertraline 100 mg tablet Commonly known as:  ZOLOFT  
 Take 1.5 Tabs by mouth daily. For depression. urea 40 % topical cream  
Commonly known as:  CARMOL Apply  to affected area nightly. Apply to feet after shower Prescriptions Printed Refills  
 propranolol (INDERAL) 40 mg tablet 5 Sig: Take 1 Tab by mouth nightly. BP med to reduce headache frequency Class: Print Route: Oral  
  
Prescriptions Sent to Pharmacy Refills  
 sertraline (ZOLOFT) 100 mg tablet 5 Sig: Take 1.5 Tabs by mouth daily. For depression. Class: Normal  
 Pharmacy: [facility]/pharmacy #[id number], [address] [facility] #: [phone] Route: Oral  
  
We Performed the Following REFERRAL TO PHYSIATRY [PUA213 Custom] Comments:  
 Refer to [employer]. Stroke with significant left sided spasticity, left arm pain. Please evaluate, discuss options for botulinum toxin injection to reduced spasticity/ pain. Thx  
  
Follow-up Instructions Return in about 4 months (around 4/11/2018). Referral Information Referral ID Referred By Referred To  
  
 [id number] Camila Elmore Not Available Visits Status Start Date End Date 1 New Request 12/11/17 12/11/18 If your referral has a status of pending review or denied, additional information will be sent to support the outcome of this decision. Introducing [facility] & HEALTH SERVICES! Dear Shravan Glasgow: Thank you for requesting a [employer] account. Our records indicate that you already have an active [employer] account. You can access your account anytime at [email or web address] Did you know that you can access your hospital and ER discharge instructions at any time in [employer]? You can also review all of your test results from your hospital stay or ER visit. Additional Information If you have questions, please visit the Frequently Asked Questions section of the [employer] website at [email or web address] Remember, [employer] is NOT to be used for urgent needs. For medical emergencies, dial 911. Now available from your iPhone and Android! Please provide this summary of care documentation to your next provider. Your primary care clinician is listed as Madyson Gagnon. If you have any questions after today's visit, please call [phone].

## 2017-12-12 ENCOUNTER — DOCUMENTATION ONLY (OUTPATIENT)
Dept: NEUROLOGY | Age: 57
End: 2017-12-12

## 2017-12-12 LAB
ALBUMIN/CREAT UR: 9.3 MG/G CREAT (ref 0–30)
CREAT UR-MCNC: 192.4 MG/DL
MICROALBUMIN UR-MCNC: 17.9 UG/ML

## 2017-12-13 ENCOUNTER — DOCUMENTATION ONLY (OUTPATIENT)
Dept: NEUROLOGY | Age: 57
End: 2017-12-13

## 2017-12-13 NOTE — PROGRESS NOTES
Mailed physicians/medical officers statement of patient capability to manage benefits form to NPM. Copy scanned into media section of chart.

## 2017-12-20 ENCOUNTER — PATIENT OUTREACH (OUTPATIENT)
Dept: FAMILY MEDICINE CLINIC | Age: 57
End: 2017-12-20

## 2017-12-20 NOTE — PROGRESS NOTES
NN Follow-Up          Follow up call placed to patient. Patient discharged from [facility] on 12/5/17 for Chest Pain. Panel Manager contacted the patient by telephone to perform post ED discharge follow up. No answer, message left requesting return call. Will continue to reach out to patient regarding ED visit. Patient has followed up with PCP.

## 2018-01-03 ENCOUNTER — TELEPHONE (OUTPATIENT)
Dept: FAMILY MEDICINE CLINIC | Age: 58
End: 2018-01-03

## 2018-01-04 ENCOUNTER — PATIENT OUTREACH (OUTPATIENT)
Dept: FAMILY MEDICINE CLINIC | Age: 58
End: 2018-01-04

## 2018-01-04 RX ORDER — ATORVASTATIN CALCIUM 10 MG/1
TABLET, FILM COATED ORAL
Qty: 30 TAB | Refills: 0 | Status: SHIPPED | OUTPATIENT
Start: 2018-01-04 | End: 2018-02-01 | Stop reason: SDUPTHER

## 2018-01-04 RX ORDER — CLOPIDOGREL BISULFATE 75 MG/1
TABLET ORAL
Qty: 30 TAB | Refills: 3 | Status: SHIPPED | OUTPATIENT
Start: 2018-01-04 | End: 2018-02-05 | Stop reason: SDUPTHER

## 2018-01-04 NOTE — TELEPHONE ENCOUNTER
Please call patient and her family and let them know she needs to fast for cholesterol labs when she comes to her appointment later this month. Delmar Moss

## 2018-01-04 NOTE — PROGRESS NOTES
Panel Manager will resolve 12/4/17 [facility]. Patient has been contacted and has followed up with PCP. No sign that patient has return to ED/Hospital in the last 30 days.

## 2018-01-08 ENCOUNTER — TELEPHONE (OUTPATIENT)
Dept: FAMILY MEDICINE CLINIC | Age: 58
End: 2018-01-08

## 2018-01-08 NOTE — TELEPHONE ENCOUNTER
Dental Hygienist, Nuria from Dr Marla Banks called stating that Ms. Carter Galvin is  scheduled to have deep scaling on February 5th which could cause a fair amount of bleeding. Asking if Ms Carter Galvin should come off any medications prior to procedure? If she is not available when calling back, you  may speak with the , [facility] and [facility].  [phone]

## 2018-02-03 DIAGNOSIS — I10 ESSENTIAL HYPERTENSION: ICD-10-CM

## 2018-02-03 DIAGNOSIS — G44.221 CHRONIC TENSION-TYPE HEADACHE, INTRACTABLE: ICD-10-CM

## 2018-02-03 RX ORDER — PROPRANOLOL HYDROCHLORIDE 80 MG/1
CAPSULE, EXTENDED RELEASE ORAL
Qty: 30 CAP | Refills: 10 | Status: SHIPPED | OUTPATIENT
Start: 2018-02-03 | End: 2018-02-06 | Stop reason: SDUPTHER

## 2018-02-03 RX ORDER — ATORVASTATIN CALCIUM 10 MG/1
TABLET, FILM COATED ORAL
Qty: 30 TAB | Refills: 2 | Status: SHIPPED | OUTPATIENT
Start: 2018-02-03 | End: 2018-02-05 | Stop reason: SDUPTHER

## 2018-02-05 DIAGNOSIS — G44.221 CHRONIC TENSION-TYPE HEADACHE, INTRACTABLE: ICD-10-CM

## 2018-02-05 DIAGNOSIS — I10 ESSENTIAL HYPERTENSION: ICD-10-CM

## 2018-02-05 RX ORDER — PROPRANOLOL HYDROCHLORIDE 80 MG/1
80 CAPSULE, EXTENDED RELEASE ORAL DAILY
Qty: 90 CAP | Status: CANCELLED | OUTPATIENT
Start: 2018-02-05

## 2018-02-06 ENCOUNTER — OFFICE VISIT (OUTPATIENT)
Dept: FAMILY MEDICINE CLINIC | Age: 58
End: 2018-02-06

## 2018-02-06 VITALS
HEART RATE: 60 BPM | WEIGHT: 112 LBS | HEIGHT: 66 IN | TEMPERATURE: 97.5 F | RESPIRATION RATE: 18 BRPM | BODY MASS INDEX: 18 KG/M2 | SYSTOLIC BLOOD PRESSURE: 125 MMHG | DIASTOLIC BLOOD PRESSURE: 44 MMHG

## 2018-02-06 DIAGNOSIS — G44.219 EPISODIC TENSION-TYPE HEADACHE, NOT INTRACTABLE: ICD-10-CM

## 2018-02-06 DIAGNOSIS — Z86.73 HX OF COMPLETED STROKE: ICD-10-CM

## 2018-02-06 DIAGNOSIS — R07.89 OTHER CHEST PAIN: ICD-10-CM

## 2018-02-06 DIAGNOSIS — I67.9 SPASTIC HEMIPLEGIA DUE TO CEREBROVASCULAR DISEASE, UNSPECIFIED CEREBROVASCULAR DISEASE TYPE, UNSPECIFIED HEMIPLEGIA LATERALITY: ICD-10-CM

## 2018-02-06 DIAGNOSIS — G81.10 SPASTIC HEMIPLEGIA DUE TO CEREBROVASCULAR DISEASE, UNSPECIFIED CEREBROVASCULAR DISEASE TYPE, UNSPECIFIED HEMIPLEGIA LATERALITY: ICD-10-CM

## 2018-02-06 DIAGNOSIS — F01.50 VASCULAR DEMENTIA WITHOUT BEHAVIORAL DISTURBANCE (HCC): ICD-10-CM

## 2018-02-06 DIAGNOSIS — I10 ESSENTIAL HYPERTENSION: ICD-10-CM

## 2018-02-06 DIAGNOSIS — E11.9 TYPE 2 DIABETES MELLITUS WITHOUT COMPLICATION, WITHOUT LONG-TERM CURRENT USE OF INSULIN (HCC): Primary | ICD-10-CM

## 2018-02-06 RX ORDER — PROPRANOLOL HYDROCHLORIDE 60 MG/1
CAPSULE, EXTENDED RELEASE ORAL
Qty: 30 CAP | Refills: 1 | Status: SHIPPED | OUTPATIENT
Start: 2018-02-06 | End: 2018-04-18

## 2018-02-06 NOTE — PROGRESS NOTES
HISTORY OF PRESENT ILLNESS  Chichi Vasquez is a 62 y.o. female. Hypertension   The history is provided by the patient and relative (her daughter is with her). This is a chronic problem. The current episode started more than 1 week ago. The problem occurs constantly. The problem has not changed since onset. Associated symptoms include chest pain. Pertinent negatives include no abdominal pain, no headaches and no shortness of breath. Associated symptoms comments: She has mid, upper chest pain all day yesterday, that felt lie a muscled spasm. She took one of her pain pills and it went away. Denies N/V, diaphoresis, near syncope, irregular heart beat or palpitations. This has happened only once. . Nothing aggravates the symptoms. The symptoms are relieved by medications. Treatments tried: Inderal LA. The treatment provided significant relief. Review of Systems   Constitutional: Negative for diaphoresis and weight loss. No weight gain   Eyes: Negative for blurred vision. Respiratory: Negative for shortness of breath. Cardiovascular: Positive for chest pain. Negative for palpitations and leg swelling. Gastrointestinal: Negative for abdominal pain, nausea and vomiting. Neurological: Positive for sensory change and focal weakness. Negative for dizziness, speech change and headaches. Visit Vitals    /44    Pulse 60    Temp 97.5 °F (36.4 °C) (Oral)    Resp 18    Ht 5' 6\" (1.676 m)    Wt 112 lb (50.8 kg)    BMI 18.08 kg/m2     BP Readings from Last 3 Encounters:   02/06/18 125/44   12/11/17 125/59   12/11/17 116/70     Physical Exam   Constitutional: She appears well-developed and well-nourished. No distress. Cardiovascular: Normal rate, regular rhythm and normal heart sounds. Exam reveals no gallop and no friction rub. No murmur heard. Pulmonary/Chest: Effort normal and breath sounds normal. No respiratory distress. She has no wheezes. She has no rales.    Musculoskeletal: She exhibits no edema. Neurological: She is alert. Gait abnormal.   Skin: Skin is warm and dry. She is not diaphoretic. Nursing note and vitals reviewed. ASSESSMENT and PLAN    ICD-10-CM ICD-9-CM    1. Type 2 diabetes mellitus without complication, without long-term current use of insulin (HCC) E11.9 250.00 NMR LIPOPROFILE      METABOLIC PANEL, BASIC      HEPATIC FUNCTION PANEL      HEMOGLOBIN A1C WITH EAG   2. Essential hypertension I10 401.9 propranolol LA (INDERAL LA) 60 mg SR capsule      METABOLIC PANEL, BASIC   3. Episodic tension-type headache, not intractable G44.219 339.11 propranolol LA (INDERAL LA) 60 mg SR capsule   4. Other chest pain R07.89 786.59    5. Hx of completed stroke Z86.73 V12.54    6. Spastic hemiplegia due to cerebrovascular disease, unspecified cerebrovascular disease type, unspecified hemiplegia laterality (HCC) G81.10 342.10     I67.9 437.9    7. Vascular dementia without behavioral disturbance F01.50 290.40         Needs diabetic labs  Propranolol controlling blood pressure and headaches  Musculoskeletal chest pain  History of CVA with residual hemiplegia causing mild gait difficulties  Unable to obtain a complete history from patient due to her dementia  Labs per orders. Refills per orders    Follow-up Disposition:  Return in about 6 weeks (around 3/20/2018) for blood pressure, diabetes. Reviewed plan of care. Patient has provided input and agrees with goals.

## 2018-02-06 NOTE — MR AVS SNAPSHOT
[address] 
[phone] Patient: Miguel Angel Sampson MRN: [id number]:[id number] Visit Information Date & Time Provider Department Dept. Phone Encounter #  
 2/6/2018 11:30 AM Larry MayberryTiffany 34 [id number] Follow-up Instructions Return in about 6 weeks (around 3/20/2018) for blood pressure, diabetes. Your Appointments 4/9/2018  8:40 AM  
Follow Up with Carlito Hardy MD  
[facility] Appt Note: follow up dementia/headache [place] [address] [facility] 99 [address] [phone]  
  
   
 [place] [address] [id number] [address] Upcoming Health Maintenance Date Due Pneumococcal 19-64 Highest Risk (1 of 3 - PCV13) 4/24/1979 EYE EXAM RETINAL OR DILATED Q1 12/13/2017 FOOT EXAM Q1 1/17/2018 BREAST CANCER SCRN MAMMOGRAM 5/23/2018 HEMOGLOBIN A1C Q6M 3/12/2018 LIPID PANEL Q1 4/14/2018 MICROALBUMIN Q1 12/11/2018 PAP AKA CERVICAL CYTOLOGY 5/13/2021 COLONOSCOPY 11/12/2025 DTaP/Tdap/Td series (2 - Td) 12/14/2026 Allergies as of 2/6/2018  Review Complete On: 2/6/2018 By: Larry Mayberry MD  
  
 Severity Noted Reaction Type Reactions Namenda [Memantine]  11/17/2016    Other (comments) Side effects Current Immunizations  Reviewed on 12/14/2016 No immunizations on file. Not reviewed this visit You Were Diagnosed With   
  
 Codes Comments Type 2 diabetes mellitus without complication, without long-term current use of insulin (HCC)    -  Primary ICD-10-CM: E11.9 ICD-9-CM: 250.00 Essential hypertension     ICD-10-CM: I10 
ICD-9-CM: 401.9 Chronic tension-type headache, intractable     ICD-10-CM: J08.895 ICD-9-CM: 339.12 Vitals BP Pulse Temp Resp Height(growth percentile) Weight(growth percentile) 125/44 60 97.5 °F (36.4 °C) (Oral) 18 5' 6\" (1.676 m) 112 lb (50.8 kg) BMI OB Status Smoking Status 18.08 kg/m2 Postmenopausal Former Smoker Vitals History BMI and BSA Data Body Mass Index Body Surface Area 18.08 kg/m 2 1.54 m 2 Preferred Pharmacy Pharmacy Name Phone [id number] [address] AT [facility] [phone] Your Updated Medication List  
  
   
This list is accurate as of: 2/6/18 12:40 PM.  Always use your most recent med list.  
  
  
  
  
 atorvastatin 10 mg tablet Commonly known as:  LIPITOR  
TAKE 1 TABLET BY MOUTH DAILY. butalbital-acetaminophen-caffeine -40 mg per tablet Commonly known as:  FIORICET, ESGIC  
TAKE 1 TO 2 TABS AT ONSET OF HEADACHE. MAY REPEAT 1 TAB IN 4 HOURS IF NEEDED. LIMIT 3TIMES/WEEK  
  
 clopidogrel 75 mg Tab Commonly known as:  PLAVIX TAKE 1 TAB BY MOUTH DAILY. FOR STROKE PREVENTION. donepezil 10 mg tablet Commonly known as:  ARICEPT  
TAKE 1 TAB BY MOUTH NIGHTLY FOR 90 DAYS. FOR VASCULAR DEMENTIA  
  
 multivitamin tablet Commonly known as:  ONE A DAY Take 1 Tab by mouth daily. propranolol LA 60 mg SR capsule Commonly known as:  INDERAL LA  
TAKE 1 CAP BY MOUTH DAILY. sertraline 100 mg tablet Commonly known as:  ZOLOFT Take 1.5 Tabs by mouth daily. For depression. urea 40 % topical cream  
Commonly known as:  CARMOL Apply  to affected area nightly. Apply to feet after shower Prescriptions Sent to Pharmacy Refills  
 propranolol LA (INDERAL LA) 60 mg SR capsule 1 Sig: TAKE 1 CAP BY MOUTH DAILY. Class: Normal  
 Pharmacy: [id number] [place], [id number] [place] [address] Ph #: [phone] We Performed the Following HEMOGLOBIN A1C WITH EAG [30549 CPT(R)] HEPATIC FUNCTION PANEL [88377 CPT(R)] METABOLIC PANEL, BASIC [39207 CPT(R)] NMR LIPOPROFILE [id number] CPT(R)] Follow-up Instructions Return in about 6 weeks (around 3/20/2018) for blood pressure, diabetes. Introducing [facility] & HEALTH SERVICES! Dear Gabby Wray: Thank you for requesting a [employer] account. Our records indicate that you already have an active [employer] account. You can access your account anytime at [email or web address]. [employer] Did you know that you can access your hospital and ER discharge instructions at any time in [employer]? You can also review all of your test results from your hospital stay or ER visit. Additional Information If you have questions, please visit the Frequently Asked Questions section of the [employer] website at [email or web address] Remember, [employer] is NOT to be used for urgent needs. For medical emergencies, dial 911. Now available from your iPhone and Android! Please provide this summary of care documentation to your next provider. Your primary care clinician is listed as Nicole Garibay. If you have any questions after today's visit, please call [phone].

## 2018-02-06 NOTE — PROGRESS NOTES
Chief Complaint   Patient presents with    Hypertension     3 mo f/u     1. Have you been to the ER, urgent care clinic since your last visit? No Hospitalized since your last visit? No     2. Have you seen or consulted any other health care providers outside of the [address] [place] [address] since your last visit? Include any pap smears or colon screening.  No

## 2018-02-07 RX ORDER — CLOPIDOGREL BISULFATE 75 MG/1
75 TABLET ORAL DAILY
Qty: 90 TAB | Refills: 3 | Status: SHIPPED | OUTPATIENT
Start: 2018-02-07 | End: 2019-02-15 | Stop reason: SDUPTHER

## 2018-02-07 RX ORDER — ATORVASTATIN CALCIUM 10 MG/1
10 TABLET, FILM COATED ORAL DAILY
Qty: 90 TAB | Refills: 0 | Status: SHIPPED | OUTPATIENT
Start: 2018-02-07 | End: 2018-04-16 | Stop reason: SDUPTHER

## 2018-02-15 ENCOUNTER — TELEPHONE (OUTPATIENT)
Dept: NEUROLOGY | Age: 58
End: 2018-02-15

## 2018-02-15 LAB
ALBUMIN SERPL-MCNC: 4.2 G/DL (ref 3.5–5.5)
ALP SERPL-CCNC: 87 IU/L (ref 39–117)
ALT SERPL-CCNC: 46 IU/L (ref 0–32)
AST SERPL-CCNC: 33 IU/L (ref 0–40)
BILIRUB DIRECT SERPL-MCNC: 0.11 MG/DL (ref 0–0.4)
BILIRUB SERPL-MCNC: 0.3 MG/DL (ref 0–1.2)
BUN SERPL-MCNC: 14 MG/DL (ref 6–24)
BUN/CREAT SERPL: 16 (ref 9–23)
CALCIUM SERPL-MCNC: 9.2 MG/DL (ref 8.7–10.2)
CHLORIDE SERPL-SCNC: 101 MMOL/L (ref 96–106)
CHOLEST SERPL-MCNC: 112 MG/DL (ref 100–199)
CO2 SERPL-SCNC: 25 MMOL/L (ref 18–29)
CREAT SERPL-MCNC: 0.87 MG/DL (ref 0.57–1)
EST. AVERAGE GLUCOSE BLD GHB EST-MCNC: 157 MG/DL
GFR SERPLBLD CREATININE-BSD FMLA CKD-EPI: 74 ML/MIN/1.73
GFR SERPLBLD CREATININE-BSD FMLA CKD-EPI: 86 ML/MIN/1.73
GLUCOSE SERPL-MCNC: 96 MG/DL (ref 65–99)
HBA1C MFR BLD: 7.1 % (ref 4.8–5.6)
HDL SERPL-SCNC: 31.3 UMOL/L
HDLC SERPL-MCNC: 54 MG/DL
INTERPRETATION, 910389: NORMAL
LDL SERPL QN: 20.9 NM
LDL SERPL-SCNC: 321 NMOL/L
LDL SMALL SERPL-SCNC: 109 NMOL/L
LDLC SERPL CALC-MCNC: 40 MG/DL (ref 0–99)
LP-IR SCORE SERPL: <25
Lab: NORMAL
POTASSIUM SERPL-SCNC: 4.4 MMOL/L (ref 3.5–5.2)
PROT SERPL-MCNC: 7.6 G/DL (ref 6–8.5)
SODIUM SERPL-SCNC: 141 MMOL/L (ref 134–144)
TRIGL SERPL-MCNC: 90 MG/DL (ref 0–149)

## 2018-02-15 NOTE — TELEPHONE ENCOUNTER
Received [facility] + PA denial letter regarding Midrin 65/100/325 mg #36/30 day s  Reason: patient policy terminated on 5/55/98  ]  Disclaimer: PA request faxed to + on 1/12/18    PA case closed unfavorable for patient     Bailee Son informed of this matter

## 2018-02-16 ENCOUNTER — TELEPHONE (OUTPATIENT)
Dept: FAMILY MEDICINE CLINIC | Age: 58
End: 2018-02-16

## 2018-02-17 PROBLEM — I67.9: Status: ACTIVE | Noted: 2018-02-17

## 2018-02-17 PROBLEM — G81.10: Status: ACTIVE | Noted: 2018-02-17

## 2018-02-17 PROBLEM — G43.009 MIGRAINE WITHOUT AURA AND WITHOUT STATUS MIGRAINOSUS, NOT INTRACTABLE: Status: ACTIVE | Noted: 2017-12-11

## 2018-02-17 NOTE — TELEPHONE ENCOUNTER
Please call patient and her daughter and let them know her diabetes is not controlled and one of her liver function tests is high. She needs to schedule an appointment and bring her blood sugars.

## 2018-02-19 NOTE — TELEPHONE ENCOUNTER
Patient's daughter, Ayah Rocha informed & appointment scheduled for Friday, March 2, 2018 at 11:15 am.

## 2018-03-17 RX ORDER — CALCIUM CITRATE/VITAMIN D3 200MG-6.25
TABLET ORAL
Qty: 100 STRIP | Refills: 5 | Status: SHIPPED | OUTPATIENT
Start: 2018-03-17 | End: 2018-03-20 | Stop reason: SDUPTHER

## 2018-03-23 RX ORDER — LANCETS 28 GAUGE
EACH MISCELLANEOUS
Qty: 100 LANCET | Refills: 3 | Status: SHIPPED | OUTPATIENT
Start: 2018-03-23 | End: 2018-04-30 | Stop reason: SDUPTHER

## 2018-04-09 ENCOUNTER — OFFICE VISIT (OUTPATIENT)
Dept: NEUROLOGY | Age: 58
End: 2018-04-09

## 2018-04-09 VITALS
BODY MASS INDEX: 18.16 KG/M2 | OXYGEN SATURATION: 96 % | WEIGHT: 113 LBS | HEART RATE: 78 BPM | DIASTOLIC BLOOD PRESSURE: 58 MMHG | HEIGHT: 66 IN | SYSTOLIC BLOOD PRESSURE: 130 MMHG

## 2018-04-09 DIAGNOSIS — R06.83 SNORING: Primary | ICD-10-CM

## 2018-04-09 DIAGNOSIS — G44.219 EPISODIC TENSION-TYPE HEADACHE, NOT INTRACTABLE: ICD-10-CM

## 2018-04-09 DIAGNOSIS — I10 ESSENTIAL HYPERTENSION: ICD-10-CM

## 2018-04-09 RX ORDER — BACLOFEN 10 MG/1
TABLET ORAL
Refills: 1 | COMMUNITY
Start: 2018-04-04

## 2018-04-09 NOTE — PROGRESS NOTES
Interval HPI:   This is a 62 y.o. 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  No new stroke or TIA symptoms. Continues taking  Plavix 75 mg/ day, Lipitor 10 mg QHS. Saw Rehab first visit last week. Placed on baclofen for spasticity then plan is to do botox injectoin in left leg, do more therapy and then return to see if any of that helped. 2) Mixed alzheimer's-vascular dementia  Cognitive difficulty started after stroke. No noticeable worsening per daughter and pt. Continues on Aricept 10 mg QHS (couldn't tolerate Namenda)     3) Depression  Increased Zoloft from 100 mg to 150 mg/ day last visit. Pt says mood is about same, daughter agrees. Some days better than others. 4) Chronic tension headaches, episodic migraine without aura  Increasing Zoloft and adding Propranolol LA 60 mg/ day didn't cut down on HA frequency. Pt notes having daily headache and 2 days a week headache is severe. Got a prior authorization on her Fioricet (unitl July 2018). No SEFx from the Propranolol that she can tell. 5) Misc: signed Social  Payee forms last visit (and scanned them into media) but daughter says she never heard back from [place] office. Printed out those forms today and asked nurse to be sure they are faxed in.        =================  Brief Hx: Right MCA territory infarction in June 2015, treated at [facility], resultant mild left hemiparesis/ spastic gait. CTA H+N: no aneurysm, mild/ minimal ICA stenosis, right MCA narrowing consistent with prior infarction. Seen by Neuropsychology/ Dr. Saad Dickson. Dx with mild-moderate dementia (vascular and/or alzheimer's), mild depression.        Brief ROS: as above or otherwise negative  There have been no significant changes in PMHx, PSHx, SHx except as noted above. Allergies   Allergen Reactions    Namenda [Memantine] Other (comments)     Side effects     Current Outpatient Prescriptions   Medication Sig Dispense Refill    baclofen (LIORESAL) 10 mg tablet TAKE 1 TABLET EVERY MORNING FOR 1 WEEK THEN INCREASE TO 1 TABLET TWICE A DAY. STOP IF SEDATION. 1    glucose blood VI test strips (TRUE METRIX GLUCOSE TEST STRIP) strip USE AS DIRECTED DAILY. Diagnoses code: E11.9 100 Strip 5    lancets 28 gauge misc Test Blood Sugar daily. Diagnoses code: E11.9 100 Lancet 3    atorvastatin (LIPITOR) 10 mg tablet Take 1 Tab by mouth daily. 90 Tab 0    clopidogrel (PLAVIX) 75 mg tab Take 1 Tab by mouth daily. 90 Tab 3    butalbital-acetaminophen-caffeine (FIORICET, ESGIC) -40 mg per tablet TAKE 1 TO 2 TABS AT ONSET OF HEADACHE. MAY REPEAT 1 TAB IN 4 HOURS IF NEEDED. LIMIT 3TIMES/WEEK 30 Tab 2    propranolol LA (INDERAL LA) 60 mg SR capsule TAKE 1 CAP BY MOUTH DAILY. 30 Cap 1    donepezil (ARICEPT) 10 mg tablet TAKE 1 TAB BY MOUTH NIGHTLY FOR 90 DAYS. FOR VASCULAR DEMENTIA 90 Tab 1    sertraline (ZOLOFT) 100 mg tablet Take 1.5 Tabs by mouth daily. For depression. 90 Tab 5    urea (CARMOL) 40 % topical cream Apply  to affected area nightly. Apply to feet after shower      multivitamin (ONE A DAY) tablet Take 1 Tab by mouth daily. Physical Exam  Blood pressure 130/58, pulse 78, height 5' 6\" (1.676 m), weight 51.3 kg (113 lb), SpO2 96 %. No acute distress, resting, affect is flat  Neck: no stiffness  Skin: no rashes    Focused Neurological Exam     Mental status: Alert and oriented to person, place situation. Language: normal fluency and comprehension; no dysarthria. CNs:   Visual fields grossly normal  Extraocular movements intact, no nystagmus  Face appears symmetric and facial strength normal.    Hearing is intact to casual conversation. Sensory: reduced LT in left arm; leg not tested today    Motor:  Left arm and le/ 5  Right arm and le/ 5  Left side spasticity    Reflexes: increased on left compared to right  Gait: spastic left hemparesis    Impression      ICD-10-CM ICD-9-CM    1. Snoring R06.83 786.09 SLEEP MEDICINE REFERRAL   2. Essential hypertension I10 401.9    3. Episodic tension-type headache, not intractable G44.219 339.11         Mixed mild-moderate vascular-alzheimer's dementia by cognitive testing  Chronic tension headache  Depression (mild to moderate)  Left sided spasticity    Continue Aricept 10 mg QHS  (didn't tolerate Namenda). Continue Zoloft 150 mg QHS. Advised daughter to increase pt's Propranolol 60 mg LA to 2 tabs/ day (also reported periodic high BPs) to see if that reduces her headache frequency and helps get BP under control (pt/ daughter reported that BP periodically spikes). If pt tolerates, then daughter will call back and I'll send in Rx for Propranolol  mg/ day. Referred to Sleep Medicine Clinic for evaluation (will defer to them to order sleep study if needed) of pt chronic snoring as she may have ALMA and that could be causing her frequent headaches and difficult to control BP. Follow up in 4 months.            Signed By: Leilani Guerrero MD     2018

## 2018-04-09 NOTE — MR AVS SNAPSHOT
[address] [facility] [address] [facility] 99 [id number] [phone] Patient: Derik Helm MRN: [id number] Visit Information Date & Time Provider Department Dept. Phone Encounter #  
 4/9/2018  8:40 AM Mary Bejarano MD [facility] Neurology [facility] [phone] [id number] Follow-up Instructions Return in about 4 months (around 8/9/2018). Your Appointments 4/25/2018  8:45 AM  
ROUTINE CARE with Jonathan Guillaume MD  
[address]) Appt Note: 6 week follow up HTN, DM; r/s  
 [address]  
[phone]  
  
   
 [facility] [address] [address] Upcoming Health Maintenance Date Due Pneumococcal 19-64 Highest Risk (1 of 3 - PCV13) 4/24/1979 EYE EXAM RETINAL OR DILATED Q1 12/13/2017 MEDICARE YEARLY EXAM 3/14/2018 BREAST CANCER SCRN MAMMOGRAM 5/23/2018 HEMOGLOBIN A1C Q6M 8/14/2018 MICROALBUMIN Q1 12/11/2018 LIPID PANEL Q1 2/14/2019 FOOT EXAM Q1 2/19/2019 PAP AKA CERVICAL CYTOLOGY 5/13/2021 COLONOSCOPY 11/12/2025 DTaP/Tdap/Td series (2 - Td) 12/14/2026 Allergies as of 4/9/2018  Review Complete On: 4/9/2018 By: Nirav Anand LPN Severity Noted Reaction Type Reactions Namenda [Memantine]  11/17/2016    Other (comments) Side effects Current Immunizations  Reviewed on 12/14/2016 No immunizations on file. Not reviewed this visit You Were Diagnosed With   
  
 Codes Comments Snoring    -  Primary ICD-10-CM: R06.83 
ICD-9-CM: 786.09 Essential hypertension     ICD-10-CM: I10 
ICD-9-CM: 401.9 Episodic tension-type headache, not intractable     ICD-10-CM: W51.537 ICD-9-CM: 339.11 Vitals BP Pulse Height(growth percentile) Weight(growth percentile) SpO2 BMI 130/58 (BP 1 Location: Right arm, BP Patient Position: Sitting) 78 5' 6\" (1.676 m) 113 lb (51.3 kg) 96% 18.24 kg/m2 OB Status Smoking Status Postmenopausal Former Smoker BMI and BSA Data Body Mass Index Body Surface Area  
 18.24 kg/m 2 1.55 m 2 Preferred Pharmacy Pharmacy Name Phone [id number] [address] [facility] AT [place] [address] [phone] Your Updated Medication List  
  
   
This list is accurate as of 4/9/18  8:58 AM.  Always use your most recent med list.  
  
  
  
  
 atorvastatin 10 mg tablet Commonly known as:  LIPITOR Take 1 Tab by mouth daily. baclofen 10 mg tablet Commonly known as:  LIORESAL  
TAKE 1 TABLET EVERY MORNING FOR 1 WEEK THEN INCREASE TO 1 TABLET TWICE A DAY. STOP IF SEDATION. butalbital-acetaminophen-caffeine -40 mg per tablet Commonly known as:  FIORICET, ESGIC  
TAKE 1 TO 2 TABS AT ONSET OF HEADACHE. MAY REPEAT 1 TAB IN 4 HOURS IF NEEDED. LIMIT 3TIMES/WEEK  
  
 clopidogrel 75 mg Tab Commonly known as:  PLAVIX Take 1 Tab by mouth daily. donepezil 10 mg tablet Commonly known as:  ARICEPT  
TAKE 1 TAB BY MOUTH NIGHTLY FOR 90 DAYS. FOR VASCULAR DEMENTIA  
  
 glucose blood VI test strips strip Commonly known as:  TRUE METRIX GLUCOSE TEST STRIP  
USE AS DIRECTED DAILY. Diagnoses code: E11.9  
  
 lancets 28 gauge Misc Test Blood Sugar daily. Diagnoses code: E11.9  
  
 multivitamin tablet Commonly known as:  ONE A DAY Take 1 Tab by mouth daily. propranolol LA 60 mg SR capsule Commonly known as:  INDERAL LA  
TAKE 1 CAP BY MOUTH DAILY. sertraline 100 mg tablet Commonly known as:  ZOLOFT Take 1.5 Tabs by mouth daily. For depression. urea 40 % topical cream  
Commonly known as:  CARMOL Apply  to affected area nightly. Apply to feet after shower We Performed the Following SLEEP MEDICINE REFERRAL [OKE622 Custom] Comments: Hx of stroke. Chronic snoring. Please see in clinic and set up for sleep study if indicated. Follow-up Instructions Return in about 4 months (around 8/9/2018). Referral Information Referral ID Referred By Referred To  
  
 [id number] Hernandez Hinkle MD   
   [address] Phone: [phone] Fax: [phone] Visits Status Start Date End Date 1 New Request 4/9/18 4/9/19 If your referral has a status of pending review or denied, additional information will be sent to support the outcome of this decision. Introducing [facility] & HEALTH SERVICES! Dear Curly Cuello: Thank you for requesting a [employer] account. Our records indicate that you already have an active [employer] account. You can access your account anytime at [email or web address]. [email or web address] Did you know that you can access your hospital and ER discharge instructions at any time in [employer]? You can also review all of your test results from your hospital stay or ER visit. Additional Information If you have questions, please visit the Frequently Asked Questions section of the [employer] website at [email or web address]/. Remember, [employer] is NOT to be used for urgent needs. For medical emergencies, dial 911. Now available from your iPhone and Android! Please provide this summary of care documentation to your next provider. Your primary care clinician is listed as Reno Merida. If you have any questions after today's visit, please call [phone].

## 2018-04-13 ENCOUNTER — DOCUMENTATION ONLY (OUTPATIENT)
Dept: NEUROLOGY | Age: 58
End: 2018-04-13

## 2018-04-13 NOTE — PROGRESS NOTES
Patients daughter states they never heard anything about SSA form originally mailed on 12/13/17. Re-mailed physicians/medical officers statement of patient capability to manage benefits form to [address] [facility] [address] to [address], [address] Copy scanned into media section of chart.

## 2018-04-17 ENCOUNTER — TELEPHONE (OUTPATIENT)
Dept: FAMILY MEDICINE CLINIC | Age: 58
End: 2018-04-17

## 2018-04-17 NOTE — TELEPHONE ENCOUNTER
90 day refill request from [address] [place] [address] for pt's atorvastatin 10 mg, already pended by Dr. Antonella Graham.  Ldm

## 2018-04-18 DIAGNOSIS — G43.009 MIGRAINE WITHOUT AURA AND WITHOUT STATUS MIGRAINOSUS, NOT INTRACTABLE: Primary | ICD-10-CM

## 2018-04-18 RX ORDER — ATORVASTATIN CALCIUM 10 MG/1
10 TABLET, FILM COATED ORAL DAILY
Qty: 90 TAB | Refills: 1 | Status: SHIPPED | OUTPATIENT
Start: 2018-04-18 | End: 2018-10-14 | Stop reason: SDUPTHER

## 2018-04-18 RX ORDER — PROPRANOLOL HYDROCHLORIDE 120 MG/1
120 CAPSULE, EXTENDED RELEASE ORAL
Qty: 90 CAP | Refills: 1 | Status: SHIPPED | OUTPATIENT
Start: 2018-04-18 | End: 2018-10-05 | Stop reason: SDUPTHER

## 2018-04-25 ENCOUNTER — OFFICE VISIT (OUTPATIENT)
Dept: FAMILY MEDICINE CLINIC | Age: 58
End: 2018-04-25

## 2018-04-25 VITALS
BODY MASS INDEX: 18 KG/M2 | RESPIRATION RATE: 18 BRPM | WEIGHT: 112 LBS | HEIGHT: 66 IN | DIASTOLIC BLOOD PRESSURE: 64 MMHG | HEART RATE: 57 BPM | TEMPERATURE: 97.8 F | SYSTOLIC BLOOD PRESSURE: 144 MMHG

## 2018-04-25 DIAGNOSIS — Z12.39 BREAST CANCER SCREENING: ICD-10-CM

## 2018-04-25 DIAGNOSIS — F01.50 VASCULAR DEMENTIA WITHOUT BEHAVIORAL DISTURBANCE (HCC): ICD-10-CM

## 2018-04-25 DIAGNOSIS — I10 ESSENTIAL HYPERTENSION: ICD-10-CM

## 2018-04-25 DIAGNOSIS — E11.9 TYPE 2 DIABETES MELLITUS WITHOUT COMPLICATION, WITHOUT LONG-TERM CURRENT USE OF INSULIN (HCC): Primary | ICD-10-CM

## 2018-04-25 RX ORDER — PHENOL/SODIUM PHENOLATE
AEROSOL, SPRAY (ML) MUCOUS MEMBRANE
Refills: 2 | COMMUNITY
Start: 2018-04-14 | End: 2018-05-19 | Stop reason: SDUPTHER

## 2018-04-25 RX ORDER — METFORMIN HYDROCHLORIDE 500 MG/1
500 TABLET, EXTENDED RELEASE ORAL
Qty: 30 TAB | Refills: 1 | Status: SHIPPED | OUTPATIENT
Start: 2018-04-25 | End: 2018-05-24 | Stop reason: SDUPTHER

## 2018-04-25 RX ORDER — LISINOPRIL 5 MG/1
5 TABLET ORAL DAILY
Qty: 30 TAB | Refills: 1 | Status: SHIPPED | OUTPATIENT
Start: 2018-04-25 | End: 2018-05-24 | Stop reason: SDUPTHER

## 2018-04-25 NOTE — PROGRESS NOTES
Chief Complaint   Patient presents with    Hypertension     6 wk f/u     1. Have you been to the ER, urgent care clinic since your last visit? No Hospitalized since your last visit? No     2. Have you seen or consulted any other health care providers outside of the [address] [place] [address] since your last visit? Include any pap smears or colon screening.  No      Pt declines pneumonia vaccine

## 2018-04-25 NOTE — MR AVS SNAPSHOT
[address] [phone] Patient: Awais Briones MRN: [id number] XOK:9/11/3580 Visit Information Date & Time Provider Department Dept. Phone Encounter #  
 4/25/2018  8:45 AM Tiffany Campa 34 [id number] Your Appointments 8/8/2018  9:40 AM  
Follow Up with Marquez Garcia MD  
[facility] Appt Note: follow up headache/dementia [place] [address] [facility] [address] [address] [phone]  
  
   
 [place] [address] [address] [address] Upcoming Health Maintenance Date Due Pneumococcal 19-64 Highest Risk (1 of 3 - PCV13) 4/24/1979 EYE EXAM RETINAL OR DILATED Q1 12/13/2017 MEDICARE YEARLY EXAM 3/14/2018 BREAST CANCER SCRN MAMMOGRAM 5/23/2018 HEMOGLOBIN A1C Q6M 8/14/2018 MICROALBUMIN Q1 12/11/2018 LIPID PANEL Q1 2/14/2019 FOOT EXAM Q1 2/19/2019 PAP AKA CERVICAL CYTOLOGY 5/13/2021 COLONOSCOPY 11/12/2025 DTaP/Tdap/Td series (2 - Td) 12/14/2026 Allergies as of 4/25/2018  Review Complete On: 4/25/2018 By: Cookie De La Paz MD  
  
 Severity Noted Reaction Type Reactions Namenda [Memantine]  11/17/2016    Other (comments) Side effects Current Immunizations  Reviewed on 12/14/2016 No immunizations on file. Not reviewed this visit You Were Diagnosed With   
  
 Codes Comments Breast cancer screening    -  Primary ICD-10-CM: Z12.31 
ICD-9-CM: V76.10 Type 2 diabetes mellitus without complication, without long-term current use of insulin (HCC)     ICD-10-CM: E11.9 ICD-9-CM: 250.00 Vascular dementia without behavioral disturbance     ICD-10-CM: F01.50 ICD-9-CM: 290.40 Essential hypertension     ICD-10-CM: I10 
ICD-9-CM: 401.9 Vitals BP Pulse Temp Resp Height(growth percentile) Weight(growth percentile) 144/64 (!) 57 97.8 °F (36.6 °C) (Oral) 18 5' 6\" (1.676 m) 112 lb (50.8 kg) BMI OB Status Smoking Status 18.08 kg/m2 Postmenopausal Former Smoker Vitals History BMI and BSA Data Body Mass Index Body Surface Area 18.08 kg/m 2 1.54 m 2 Preferred Pharmacy Pharmacy Name Phone [facility]/PHARMACY #[id number]- [address] AT [facility] [phone] Your Updated Medication List  
  
   
This list is accurate as of 4/25/18 10:07 AM.  Always use your most recent med list.  
  
  
  
  
 atorvastatin 10 mg tablet Commonly known as:  LIPITOR Take 1 Tab by mouth daily. baclofen 10 mg tablet Commonly known as:  LIORESAL  
TAKE 1 TABLET EVERY MORNING FOR 1 WEEK THEN INCREASE TO 1 TABLET TWICE A DAY. STOP IF SEDATION. butalbital-acetaminophen-caffeine -40 mg per tablet Commonly known as:  FIORICET, ESGIC  
TAKE 1 TO 2 TABS AT ONSET OF HEADACHE. MAY REPEAT 1 TAB IN 4 HOURS IF NEEDED. LIMIT 3TIMES/WEEK  
  
 clopidogrel 75 mg Tab Commonly known as:  PLAVIX Take 1 Tab by mouth daily. donepezil 10 mg tablet Commonly known as:  ARICEPT  
TAKE 1 TAB BY MOUTH NIGHTLY FOR 90 DAYS. FOR VASCULAR DEMENTIA  
  
 glucose blood VI test strips strip Commonly known as:  TRUE METRIX GLUCOSE TEST STRIP  
USE AS DIRECTED DAILY. Diagnoses code: E11.9  
  
 lancets 28 gauge Misc Test Blood Sugar daily. Diagnoses code: E11.9  
  
 lisinopril 5 mg tablet Commonly known as:  Candido Larry Take 1 Tab by mouth daily. metFORMIN  mg tablet Commonly known as:  GLUCOPHAGE XR Take 1 Tab by mouth daily (with dinner). multivitamin tablet Commonly known as:  ONE A DAY Take 1 Tab by mouth daily. Omeprazole delayed release 20 mg tablet Commonly known as:  PRILOSEC D/R  
TAKE 1 TAB BY MOUTH DAILY FOR 90 DAYS. propranolol  mg SR capsule Commonly known as:  INDERAL LA Take 1 Cap by mouth nightly for 90 days. Indications: MIGRAINE PREVENTION  
  
 sertraline 100 mg tablet Commonly known as:  ZOLOFT Take 1.5 Tabs by mouth daily. For depression. urea 40 % topical cream  
Commonly known as:  CARMOL Apply  to affected area nightly. Apply to feet after shower Prescriptions Sent to Pharmacy Refills  
 lisinopril (PRINIVIL, ZESTRIL) 5 mg tablet 1 Sig: Take 1 Tab by mouth daily. Class: Normal  
 Pharmacy: [facility]/pharmacy #[id number]- [place], [address] [facility] Ph #: [phone] Route: Oral  
 metFORMIN ER (GLUCOPHAGE XR) 500 mg tablet 1 Sig: Take 1 Tab by mouth daily (with dinner). Class: Normal  
 Pharmacy: [facility]/pharmacy #[id number]- [place], [address] [facility] Ph #: [phone] Route: Oral  
  
To-Do List   
 04/25/2018 Imaging:  SIVAKUMAR MAMMO BI SCREENING INCL CAD Introducing [facility] SERVICES! Dear Mendoza Omer: Thank you for requesting a [employer] account. Our records indicate that you already have an active [employer] account. You can access your account anytime at [email or web address] Did you know that you can access your hospital and ER discharge instructions at any time in [employer]? You can also review all of your test results from your hospital stay or ER visit. Additional Information If you have questions, please visit the Frequently Asked Questions section of the Moment[employer] website at [email or web address] Remember, Moment[employer] is NOT to be used for urgent needs. For medical emergencies, dial 911. Now available from your iPhone and Android! Please provide this summary of care documentation to your next provider. Your primary care clinician is listed as Katerine Mesa  If you have any questions after today's visit, please call [phone].

## 2018-04-30 RX ORDER — LANCETS 28 GAUGE
EACH MISCELLANEOUS
Qty: 100 LANCET | Refills: 3 | Status: SHIPPED | OUTPATIENT
Start: 2018-04-30

## 2018-04-30 RX ORDER — INSULIN PUMP SYRINGE, 3 ML
EACH MISCELLANEOUS
Qty: 1 KIT | Refills: 0 | Status: SHIPPED | OUTPATIENT
Start: 2018-04-30 | End: 2018-06-04 | Stop reason: SDUPTHER

## 2018-05-18 DIAGNOSIS — K21.9 GASTROESOPHAGEAL REFLUX DISEASE, ESOPHAGITIS PRESENCE NOT SPECIFIED: ICD-10-CM

## 2018-05-19 RX ORDER — PHENOL/SODIUM PHENOLATE
AEROSOL, SPRAY (ML) MUCOUS MEMBRANE
Qty: 30 TAB | Refills: 11 | Status: SHIPPED | OUTPATIENT
Start: 2018-05-19 | End: 2018-08-31

## 2018-05-24 DIAGNOSIS — E11.9 TYPE 2 DIABETES MELLITUS WITHOUT COMPLICATION, WITHOUT LONG-TERM CURRENT USE OF INSULIN (HCC): ICD-10-CM

## 2018-05-24 DIAGNOSIS — I10 ESSENTIAL HYPERTENSION: ICD-10-CM

## 2018-05-27 RX ORDER — METFORMIN HYDROCHLORIDE 500 MG/1
500 TABLET, EXTENDED RELEASE ORAL
Qty: 90 TAB | Refills: 1 | Status: SHIPPED | OUTPATIENT
Start: 2018-05-27

## 2018-05-27 RX ORDER — LISINOPRIL 5 MG/1
5 TABLET ORAL DAILY
Qty: 90 TAB | Refills: 0 | Status: SHIPPED | OUTPATIENT
Start: 2018-05-27 | End: 2018-08-30 | Stop reason: SDUPTHER

## 2018-06-08 RX ORDER — BLOOD-GLUCOSE METER
KIT MISCELLANEOUS
Qty: 1 KIT | Refills: 0 | Status: SHIPPED | OUTPATIENT
Start: 2018-06-08

## 2018-06-12 ENCOUNTER — TELEPHONE (OUTPATIENT)
Dept: FAMILY MEDICINE CLINIC | Age: 58
End: 2018-06-12

## 2018-06-12 DIAGNOSIS — E11.9 DIABETES MELLITUS WITHOUT COMPLICATION (HCC): Primary | ICD-10-CM

## 2018-06-12 NOTE — TELEPHONE ENCOUNTER
[facility] faxed request to change prescription to [facility] and accucheck due to [employer]

## 2018-06-13 RX ORDER — BLOOD-GLUCOSE METER
EACH MISCELLANEOUS
Qty: 1 EACH | Refills: 0 | Status: SHIPPED | OUTPATIENT
Start: 2018-06-13 | End: 2021-06-17 | Stop reason: ALTCHOICE

## 2018-06-26 ENCOUNTER — TELEPHONE (OUTPATIENT)
Dept: FAMILY MEDICINE CLINIC | Age: 58
End: 2018-06-26

## 2018-06-26 DIAGNOSIS — R29.898 WEAKNESS OF BOTH LEGS: Primary | ICD-10-CM

## 2018-06-26 NOTE — TELEPHONE ENCOUNTER
Pt needs new shower chair due to hers breaking when she recently moved.      [facility] AT [facility]   Fax # [phone]    Leg weakness

## 2018-07-09 NOTE — TELEPHONE ENCOUNTER
Cate Nguyen called from pt's [place] plan to check on status of shower chair order requested for pt, was advised Dr. Armida Trevino placed order and nurse faxed on 7/2/18.  Kayla

## 2018-07-14 DIAGNOSIS — F01.50 VASCULAR DEMENTIA WITHOUT BEHAVIORAL DISTURBANCE (HCC): ICD-10-CM

## 2018-07-16 RX ORDER — DONEPEZIL HYDROCHLORIDE 10 MG/1
TABLET, FILM COATED ORAL
Qty: 90 TAB | Refills: 1 | Status: SHIPPED | OUTPATIENT
Start: 2018-07-16 | End: 2019-02-04 | Stop reason: SDUPTHER

## 2018-08-30 DIAGNOSIS — E11.9 TYPE 2 DIABETES MELLITUS WITHOUT COMPLICATION, WITHOUT LONG-TERM CURRENT USE OF INSULIN (HCC): ICD-10-CM

## 2018-08-30 DIAGNOSIS — I10 ESSENTIAL HYPERTENSION: ICD-10-CM

## 2018-08-30 RX ORDER — LISINOPRIL 5 MG/1
TABLET ORAL
Qty: 90 TAB | Refills: 0 | Status: SHIPPED | OUTPATIENT
Start: 2018-08-30 | End: 2018-08-31 | Stop reason: SDUPTHER

## 2018-08-31 ENCOUNTER — OFFICE VISIT (OUTPATIENT)
Dept: FAMILY MEDICINE CLINIC | Age: 58
End: 2018-08-31

## 2018-08-31 VITALS
SYSTOLIC BLOOD PRESSURE: 142 MMHG | TEMPERATURE: 97.7 F | DIASTOLIC BLOOD PRESSURE: 63 MMHG | BODY MASS INDEX: 18.42 KG/M2 | HEART RATE: 66 BPM | WEIGHT: 114.6 LBS | HEIGHT: 66 IN | RESPIRATION RATE: 12 BRPM

## 2018-08-31 DIAGNOSIS — R51.9 CHRONIC NONINTRACTABLE HEADACHE, UNSPECIFIED HEADACHE TYPE: ICD-10-CM

## 2018-08-31 DIAGNOSIS — I10 ESSENTIAL HYPERTENSION: ICD-10-CM

## 2018-08-31 DIAGNOSIS — E11.9 TYPE 2 DIABETES MELLITUS WITHOUT COMPLICATION, WITHOUT LONG-TERM CURRENT USE OF INSULIN (HCC): Primary | ICD-10-CM

## 2018-08-31 DIAGNOSIS — G89.29 CHRONIC NONINTRACTABLE HEADACHE, UNSPECIFIED HEADACHE TYPE: ICD-10-CM

## 2018-08-31 DIAGNOSIS — G81.10: ICD-10-CM

## 2018-08-31 DIAGNOSIS — I67.9: ICD-10-CM

## 2018-08-31 DIAGNOSIS — F32.1 MODERATE SINGLE CURRENT EPISODE OF MAJOR DEPRESSIVE DISORDER (HCC): ICD-10-CM

## 2018-08-31 RX ORDER — LISINOPRIL 10 MG/1
TABLET ORAL
Qty: 30 TAB | Refills: 0 | Status: SHIPPED | OUTPATIENT
Start: 2018-08-31 | End: 2018-09-24 | Stop reason: SDUPTHER

## 2018-08-31 RX ORDER — BUTALBITAL, ACETAMINOPHEN AND CAFFEINE 50; 325; 40 MG/1; MG/1; MG/1
TABLET ORAL
Qty: 15 TAB | Refills: 0 | Status: SHIPPED | OUTPATIENT
Start: 2018-08-31 | End: 2018-11-14 | Stop reason: SDUPTHER

## 2018-08-31 NOTE — MR AVS SNAPSHOT
[address] [phone] Patient: Yosef Oliver MRN: [id number] NNW:[birth year] Visit Information Date & Time Provider Department Dept. Phone Encounter #  
 8/31/2018  8:15 AM Christopher Guandeo 34 [id number] Follow-up Instructions Return in about 2 weeks (around 9/14/2018) for blood pressure, headaches. Your Appointments 9/6/2018  9:20 AM  
Any with Pinky Rosas MD  
[address] ([facility]) Appt Note: NP, refd by Dr. Vinicius Carey, snoring [address] [facility], [address] [address] [address] [place] [address], [address] [address]  
  
    
 10/4/2018  3:20 PM  
Follow Up with Vinicius Carey MD  
[facility]) Appt Note: prov r/s (from [facility]) f/u headaches/dementia [facility] 07/25/18; prov r/s (from [facility]) f/u headaches/dementia [place] [facility] [address] [facility] [address] [address] [phone]  
  
   
 [place] [address] [facility] [address] [address] [address] [place] Upcoming Health Maintenance Date Due Pneumococcal 19-64 Highest Risk (1 of 3 - PCV13) 4/24/1979 BREAST CANCER SCRN MAMMOGRAM 5/23/2018 Influenza Age 5 to Adult 8/1/2018 HEMOGLOBIN A1C Q6M 8/14/2018 MEDICARE YEARLY EXAM 8/30/2018 MICROALBUMIN Q1 12/11/2018 EYE EXAM RETINAL OR DILATED Q1 2/14/2019 LIPID PANEL Q1 2/14/2019 FOOT EXAM Q1 2/19/2019 PAP AKA CERVICAL CYTOLOGY 5/13/2021 COLONOSCOPY 11/12/2025 DTaP/Tdap/Td series (2 - Td) 12/14/2026 Allergies as of 8/31/2018  Review Complete On: 8/31/2018 By: Marybel Holbrook MD  
  
 Severity Noted Reaction Type Reactions Namenda [Memantine]  11/17/2016    Other (comments) Side effects Current Immunizations  Reviewed on 12/14/2016 No immunizations on file. Not reviewed this visit You Were Diagnosed With   
  
 Codes Comments Type 2 diabetes mellitus without complication, without long-term current use of insulin (HCC)    -  Primary ICD-10-CM: E11.9 ICD-9-CM: 250.00 Chronic nonintractable headache, unspecified headache type     ICD-10-CM: R51 ICD-9-CM: 784.0 Essential hypertension     ICD-10-CM: I10 
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 ([facility] [address]     ICD-10-CM: I67.9, G81.10 ICD-9-CM: 437.9, 342.10 Vitals BP Pulse Temp Resp Height(growth percentile) Weight(growth percentile) 142/63 (BP 1 Location: Right arm, BP Patient Position: Sitting) 66 97.7 °F (36.5 °C) (Oral) 12 5' 6\" (1.676 m) 114 lb 9.6 oz (52 kg) BMI OB Status Smoking Status 18.5 kg/m2 Postmenopausal Former Smoker Vitals History BMI and BSA Data Body Mass Index Body Surface Area 18.5 kg/m 2 1.56 m 2 Preferred Pharmacy Pharmacy Name Phone [facility]/PHARMACY #[id number] Pr[place], [facility] Tiffanie Hemphill [phone] Your Updated Medication List  
  
   
This list is accurate as of 8/31/18  9:16 AM.  Always use your most recent med list.  
  
  
  
  
 atorvastatin 10 mg tablet Commonly known as:  LIPITOR Take 1 Tab by mouth daily. baclofen 10 mg tablet Commonly known as:  LIORESAL  
TAKE 1 TABLET EVERY MORNING FOR 1 WEEK THEN INCREASE TO 1 TABLET TWICE A DAY. STOP IF SEDATION. butalbital-acetaminophen-caffeine -40 mg per tablet Commonly known as:  FIORICET, ESGIC  
TAKE 1 TO 2 TABS AT ONSET OF HEADACHE. MAY REPEAT 1 TAB IN 4 HOURS IF NEEDED. LIMIT 3 TIMES/WEEK  
  
 clopidogrel 75 mg Tab Commonly known as:  PLAVIX Take 1 Tab by mouth daily. donepezil 10 mg tablet Commonly known as:  ARICEPT  
 TAKE 1 TAB BY MOUTH NIGHTLY FOR 90 DAYS. FOR VASCULAR DEMENTIA * glucose blood VI test strips strip Commonly known as:  TRUE METRIX GLUCOSE TEST STRIP  
USE AS DIRECTED DAILY. Diagnoses code: E11.9  
  
 * glucose blood VI test strips strip Commonly known as:  blood glucose test  
Test blood sugar daily. Dx. E11.9  
  
 * glucose blood VI test strips strip Commonly known as:  TRUE METRIX GLUCOSE TEST STRIP Test daily or as directed. E11.9.  
  
 lancets 28 gauge Misc Test Blood Sugar daily. Diagnoses code: E11.9  
  
 lisinopril 10 mg tablet Commonly known as:  PRINIVIL, ZESTRIL  
TAKE 1 TABLET BY MOUTH EVERY DAY  
  
 metFORMIN  mg tablet Commonly known as:  GLUCOPHAGE XR Take 1 Tab by mouth daily (with dinner). multivitamin tablet Commonly known as:  ONE A DAY Take 1 Tab by mouth daily. [employer] ULTRA2 monitoring kit Generic drug:  Blood-Glucose Meter TEST BLOOD SUGAR DAILY. DX. E11.9 * Blood-Glucose Meter Misc Commonly known as:  TRUE METRIX GLUCOSE METER Dx Code-E11.9 Type 2 Diabetes. sertraline 100 mg tablet Commonly known as:  ZOLOFT Take 1.5 Tabs by mouth daily. For depression. * Notice: This list has 5 medication(s) that are the same as other medications prescribed for you. Read the directions carefully, and ask your doctor or other care provider to review them with you. Prescriptions Printed Refills  
 butalbital-acetaminophen-caffeine (FIORICET, ESGIC) -40 mg per tablet 0 Sig: TAKE 1 TO 2 TABS AT ONSET OF HEADACHE. MAY REPEAT 1 TAB IN 4 HOURS IF NEEDED. LIMIT 3 TIMES/WEEK Class: Print Prescriptions Sent to Pharmacy Refills  
 lisinopril (PRINIVIL, ZESTRIL) 10 mg tablet 0 Sig: TAKE 1 TABLET BY MOUTH EVERY DAY Class: Normal  
 Pharmacy: [facility]/pharmacy [address] [place] [address], [address] [place] [address] Ph #: [phone] We Performed the Following HEMOGLOBIN A1C WITH EAG [29300 CPT(R)] METABOLIC PANEL, BASIC [89425 CPT(R)] Follow-up Instructions Return in about 2 weeks (around 9/14/2018) for blood pressure, headaches. Introducing [facility]! Dear Adi Alberto: Thank you for requesting a [employer] account. Our records indicate that you already have an active [employer] account. You can access your account anytime at [email or web address] Did you know that you can access your hospital and ER discharge instructions at any time in [employer]? You can also review all of your test results from your hospital stay or ER visit. Additional Information If you have questions, please visit the Frequently Asked Questions section of the [employer] website at [email or web address] Remember, [employer] is NOT to be used for urgent needs. For medical emergencies, dial 911. Now available from your iPhone and Android! Please provide this summary of care documentation to your next provider. Your primary care clinician is listed as Feliberto Ellis. If you have any questions after today's visit, please call [phone].

## 2018-08-31 NOTE — PROGRESS NOTES
Bo Reardon is a 62 y.o. female      Chief Complaint   Patient presents with    Hypertension       1. Have you been to the ER, urgent care clinic since your last visit? Hospitalized since your last visit? No    2. Have you seen or consulted any other health care providers outside of the [address] [place] [address] since your last visit? Include any pap smears or colon screening.  No

## 2018-08-31 NOTE — PROGRESS NOTES
HISTORY OF PRESENT ILLNESS  Yosef Oliver is a 62 y.o. female. HPI Comments: Yosef Oliver is here for diabetic follow up. Her daughter is with her and gives much of the history due to the patient's dementia. Their  FBS have been in the 130's. This is a chronic problem which has changed for the better. Denies chest pain, abdominal pain, headaches, or shortness of breath. She has been having daily headaches since the first of the month. It is worsened by nothing, and improved by metformin, which is working fairly well. Review of Systems   Constitutional: Negative for weight loss. No weight gain   Eyes: Negative for blurred vision. Respiratory: Negative for shortness of breath. Cardiovascular: Negative for chest pain and leg swelling. Gastrointestinal: Negative for abdominal pain. Genitourinary:        No polyuria   Neurological: Positive for focal weakness. Negative for dizziness, sensory change and speech change. Chronic LUE and LLE weakness from her CVA. Endo/Heme/Allergies: Negative for polydipsia. Psychiatric/Behavioral: Negative for depression. The patient is not nervous/anxious and does not have insomnia. Visit Vitals    /63 (BP 1 Location: Right arm, BP Patient Position: Sitting)    Pulse 66    Temp 97.7 °F (36.5 °C) (Oral)    Resp 12    Ht 5' 6\" (1.676 m)    Wt 114 lb 9.6 oz (52 kg)    BMI 18.5 kg/m2     BP Readings from Last 3 Encounters:   08/31/18 142/63   04/25/18 144/64   04/09/18 130/58     Physical Exam   Constitutional: She is oriented to person, place, and time. She appears well-developed and well-nourished. No distress. Eyes: EOM are normal. Pupils are equal, round, and reactive to light. Fundoscopic exam:       The right eye shows no papilledema. The left eye shows no papilledema. Cardiovascular: Normal rate, regular rhythm and normal heart sounds. Exam reveals no gallop and no friction rub.     No murmur heard.  Pulmonary/Chest: Effort normal and breath sounds normal. No respiratory distress. She has no wheezes. She has no rales. Musculoskeletal: She exhibits no edema. Neurological: She is alert and oriented to person, place, and time. She displays no tremor. Skin: Skin is warm and dry. She is not diaphoretic. Psychiatric: She has a normal mood and affect. Her behavior is normal. Judgment and thought content normal.   Nursing note and vitals reviewed. ASSESSMENT and PLAN    ICD-10-CM ICD-9-CM    1. Type 2 diabetes mellitus without complication, without long-term current use of insulin ([facility]) E11.9 250.00 HEMOGLOBIN A1C WITH EAG      METABOLIC PANEL, BASIC      lisinopril (PRINIVIL, ZESTRIL) 10 mg tablet   2. Chronic nonintractable headache, unspecified headache type R51 784.0 butalbital-acetaminophen-caffeine (FIORICET, ESGIC) -40 mg per tablet   3. Essential hypertension W52 720.2 METABOLIC PANEL, BASIC      lisinopril (PRINIVIL, ZESTRIL) 10 mg tablet   4. Moderate single current episode of major depressive disorder ([facility]) F32.1 296.22    5. Spastic hemiplegia due to cerebrovascular disease ([facility]) I67.9 437.9     G81.10 890.81         Uncertain diabetic control  Headaches increasing in frequency  Blood pressure mildly elevated  Depression stable  Hemiplegia stable  Labs per orders. Refills per orders  Increase lisinopril    Follow-up Disposition:  Return in about 2 weeks (around 9/14/2018) for blood pressure, headaches. Reviewed plan of care. Patient and her daughter have provided input and agree with goals.

## 2018-09-24 DIAGNOSIS — I10 ESSENTIAL HYPERTENSION: ICD-10-CM

## 2018-09-24 DIAGNOSIS — E11.9 TYPE 2 DIABETES MELLITUS WITHOUT COMPLICATION, WITHOUT LONG-TERM CURRENT USE OF INSULIN (HCC): ICD-10-CM

## 2018-09-25 RX ORDER — LISINOPRIL 10 MG/1
TABLET ORAL
Qty: 90 TAB | Refills: 0 | Status: SHIPPED | OUTPATIENT
Start: 2018-09-25 | End: 2018-10-21 | Stop reason: SDUPTHER

## 2018-10-05 DIAGNOSIS — G43.009 MIGRAINE WITHOUT AURA AND WITHOUT STATUS MIGRAINOSUS, NOT INTRACTABLE: ICD-10-CM

## 2018-10-05 LAB
BUN SERPL-MCNC: 11 MG/DL (ref 6–24)
BUN/CREAT SERPL: 15 (ref 9–23)
CALCIUM SERPL-MCNC: 9.2 MG/DL (ref 8.7–10.2)
CHLORIDE SERPL-SCNC: 107 MMOL/L (ref 96–106)
CO2 SERPL-SCNC: 23 MMOL/L (ref 20–29)
CREAT SERPL-MCNC: 0.72 MG/DL (ref 0.57–1)
EST. AVERAGE GLUCOSE BLD GHB EST-MCNC: 148 MG/DL
GLUCOSE SERPL-MCNC: 110 MG/DL (ref 65–99)
HBA1C MFR BLD: 6.8 % (ref 4.8–5.6)
POTASSIUM SERPL-SCNC: 3.9 MMOL/L (ref 3.5–5.2)
SODIUM SERPL-SCNC: 145 MMOL/L (ref 134–144)

## 2018-10-08 ENCOUNTER — TELEPHONE (OUTPATIENT)
Dept: FAMILY MEDICINE CLINIC | Age: 58
End: 2018-10-08

## 2018-10-12 RX ORDER — PROPRANOLOL HYDROCHLORIDE 120 MG/1
120 CAPSULE, EXTENDED RELEASE ORAL DAILY
Qty: 30 CAP | Refills: 0 | Status: SHIPPED | OUTPATIENT
Start: 2018-10-12 | End: 2018-11-11

## 2018-10-12 NOTE — TELEPHONE ENCOUNTER
Pt cancelled visit on 8-8, 8-20, and no-showed visit on 10-4-18. Will send in 1 month RF of Propranolol but will need to be seen for any further Rx.

## 2018-10-17 RX ORDER — ATORVASTATIN CALCIUM 10 MG/1
TABLET, FILM COATED ORAL
Qty: 90 TAB | Refills: 1 | Status: SHIPPED | OUTPATIENT
Start: 2018-10-17 | End: 2019-04-09 | Stop reason: SDUPTHER

## 2018-10-18 ENCOUNTER — OFFICE VISIT (OUTPATIENT)
Dept: FAMILY MEDICINE CLINIC | Age: 58
End: 2018-10-18

## 2018-10-18 VITALS
TEMPERATURE: 97.5 F | HEART RATE: 66 BPM | SYSTOLIC BLOOD PRESSURE: 136 MMHG | RESPIRATION RATE: 18 BRPM | HEIGHT: 66 IN | WEIGHT: 116 LBS | BODY MASS INDEX: 18.64 KG/M2 | DIASTOLIC BLOOD PRESSURE: 65 MMHG

## 2018-10-18 DIAGNOSIS — E11.9 TYPE 2 DIABETES MELLITUS WITHOUT COMPLICATION, WITHOUT LONG-TERM CURRENT USE OF INSULIN (HCC): ICD-10-CM

## 2018-10-18 DIAGNOSIS — I10 ESSENTIAL HYPERTENSION: ICD-10-CM

## 2018-10-18 DIAGNOSIS — I10 ESSENTIAL HYPERTENSION: Primary | ICD-10-CM

## 2018-10-18 NOTE — PROGRESS NOTES
Chief Complaint   Patient presents with    Blood Pressure Check     2 wk f/u and blood sugar     1. Have you been to the ER, urgent care clinic since your last visit? Hospitalized since your last visit? No     2. Have you seen or consulted any other health care providers outside of the [facility] since your last visit? Include any pap smears or colon screening.  No

## 2018-10-18 NOTE — PROGRESS NOTES
HISTORY OF PRESENT ILLNESS  Ashley Vences is a 62 y.o. female. Hypertension   The history is provided by the patient. This is a chronic problem. The current episode started more than 1 week ago. The problem occurs constantly. The problem has been gradually improving. Pertinent negatives include no chest pain, no abdominal pain, no headaches and no shortness of breath. Nothing aggravates the symptoms. The symptoms are relieved by medications. Treatments tried: lisinopril. The treatment provided significant relief. Review of Systems   Constitutional: Negative for weight loss. No weight gain   Eyes: Negative for blurred vision. Respiratory: Negative for shortness of breath. Cardiovascular: Negative for chest pain and leg swelling. Gastrointestinal: Negative for abdominal pain. Neurological: Positive for focal weakness. Negative for dizziness, sensory change, speech change and headaches. Visit Vitals  /65   Pulse 66   Temp 97.5 °F (36.4 °C) (Oral)   Resp 18   Ht 5' 6\" (1.676 m)   Wt 116 lb (52.6 kg)   BMI 18.72 kg/m²     BP Readings from Last 3 Encounters:   10/18/18 136/65   08/31/18 142/63   04/25/18 144/64     Physical Exam   Constitutional: She is oriented to person, place, and time. She appears well-developed and well-nourished. No distress. Cardiovascular: Normal rate, regular rhythm and normal heart sounds. Exam reveals no gallop and no friction rub. No murmur heard. Pulmonary/Chest: Effort normal and breath sounds normal. No respiratory distress. She has no wheezes. She has no rales. Musculoskeletal: She exhibits no edema. Neurological: She is alert and oriented to person, place, and time. Skin: Skin is warm and dry. She is not diaphoretic. Nursing note and vitals reviewed. ASSESSMENT and PLAN    ICD-10-CM ICD-9-CM    1. Essential hypertension I10 401.9         Blood pressure controlled  Continue current plans.     Follow-up Disposition:  Return in about 4 months (around 2/18/2019) for diabetes. Reviewed plan of care. Patient has provided input and agrees with goals.

## 2018-10-21 RX ORDER — LISINOPRIL 10 MG/1
TABLET ORAL
Qty: 30 TAB | Refills: 11 | Status: SHIPPED | OUTPATIENT
Start: 2018-10-21 | End: 2018-10-25 | Stop reason: SDUPTHER

## 2018-10-25 ENCOUNTER — TELEPHONE (OUTPATIENT)
Dept: FAMILY MEDICINE CLINIC | Age: 58
End: 2018-10-25

## 2018-10-25 DIAGNOSIS — I10 ESSENTIAL HYPERTENSION: ICD-10-CM

## 2018-10-25 DIAGNOSIS — E11.9 TYPE 2 DIABETES MELLITUS WITHOUT COMPLICATION, WITHOUT LONG-TERM CURRENT USE OF INSULIN (HCC): ICD-10-CM

## 2018-10-28 RX ORDER — LISINOPRIL 10 MG/1
10 TABLET ORAL DAILY
Qty: 90 TAB | Refills: 3 | Status: SHIPPED | OUTPATIENT
Start: 2018-10-28 | End: 2018-11-21 | Stop reason: SDUPTHER

## 2018-11-14 RX ORDER — BUTALBITAL, ACETAMINOPHEN AND CAFFEINE 50; 325; 40 MG/1; MG/1; MG/1
TABLET ORAL
Qty: 15 TAB | Refills: 0 | OUTPATIENT
Start: 2018-11-14 | End: 2019-01-20 | Stop reason: SDUPTHER

## 2018-11-21 DIAGNOSIS — I10 ESSENTIAL HYPERTENSION: ICD-10-CM

## 2018-11-21 DIAGNOSIS — E11.9 TYPE 2 DIABETES MELLITUS WITHOUT COMPLICATION, WITHOUT LONG-TERM CURRENT USE OF INSULIN (HCC): ICD-10-CM

## 2018-11-25 RX ORDER — LISINOPRIL 10 MG/1
10 TABLET ORAL DAILY
Qty: 90 TAB | Refills: 3 | Status: SHIPPED | OUTPATIENT
Start: 2018-11-25 | End: 2019-10-21

## 2018-11-26 RX ORDER — PROPRANOLOL HYDROCHLORIDE 120 MG/1
CAPSULE, EXTENDED RELEASE ORAL
Qty: 30 CAP | Refills: 0 | Status: SHIPPED | OUTPATIENT
Start: 2018-11-26 | End: 2019-01-08 | Stop reason: SDUPTHER

## 2019-01-14 RX ORDER — PROPRANOLOL HYDROCHLORIDE 120 MG/1
CAPSULE, EXTENDED RELEASE ORAL
Qty: 30 CAP | Refills: 0 | Status: SHIPPED | OUTPATIENT
Start: 2019-01-14 | End: 2019-02-14 | Stop reason: SDUPTHER

## 2019-01-20 RX ORDER — BUTALBITAL, ACETAMINOPHEN AND CAFFEINE 50; 325; 40 MG/1; MG/1; MG/1
TABLET ORAL
Qty: 15 TAB | Refills: 0 | OUTPATIENT
Start: 2019-01-20 | End: 2019-02-14

## 2019-01-22 ENCOUNTER — TELEPHONE (OUTPATIENT)
Dept: FAMILY MEDICINE CLINIC | Age: 59
End: 2019-01-22

## 2019-01-22 NOTE — TELEPHONE ENCOUNTER
----- Message from Catrina Hoffman sent at 1/22/2019  7:51 AM EST -----  Regarding: Dr. Adalgisa Carmichael  Pt is calling to be fit in for appt Thusday morning. She is diabetic and been experiencing left knee swelling. [phone]

## 2019-02-14 ENCOUNTER — OFFICE VISIT (OUTPATIENT)
Dept: NEUROLOGY | Age: 59
End: 2019-02-14

## 2019-02-14 VITALS
SYSTOLIC BLOOD PRESSURE: 138 MMHG | WEIGHT: 116 LBS | RESPIRATION RATE: 20 BRPM | HEIGHT: 66 IN | BODY MASS INDEX: 18.64 KG/M2 | DIASTOLIC BLOOD PRESSURE: 90 MMHG

## 2019-02-14 DIAGNOSIS — Z91.199 NO-SHOW FOR APPOINTMENT: Primary | ICD-10-CM

## 2019-02-14 DIAGNOSIS — F01.50 VASCULAR DEMENTIA WITHOUT BEHAVIORAL DISTURBANCE (HCC): ICD-10-CM

## 2019-02-14 RX ORDER — PROPRANOLOL HYDROCHLORIDE 120 MG/1
120 CAPSULE, EXTENDED RELEASE ORAL DAILY
Qty: 30 CAP | Refills: 6 | Status: SHIPPED | OUTPATIENT
Start: 2019-02-14 | End: 2019-07-15 | Stop reason: SDUPTHER

## 2019-02-14 RX ORDER — BUTALBITAL, ACETAMINOPHEN AND CAFFEINE 50; 325; 40 MG/1; MG/1; MG/1
TABLET ORAL
Qty: 30 TAB | Refills: 1 | Status: SHIPPED | OUTPATIENT
Start: 2019-02-14 | End: 2019-05-02 | Stop reason: SDUPTHER

## 2019-02-14 RX ORDER — DONEPEZIL HYDROCHLORIDE 10 MG/1
10 TABLET, FILM COATED ORAL
Qty: 30 TAB | Refills: 6 | Status: SHIPPED | OUTPATIENT
Start: 2019-02-14 | End: 2019-09-19 | Stop reason: SDUPTHER

## 2019-02-14 NOTE — PROGRESS NOTES
Interval HPI:   This is a 62 y.o. female with hx of stroke (mild left hemiparesis), mixed alzheimer-vascular dementia (by cognitive testing) who is following up for     Chief Complaint   Patient presents with    Headache     Follow up    Dementia     Follow Up     Interval Hx:     1) Hx of Right MCA stroke, left spastic hemiparesis   No new stroke or TIA symptoms. Continues taking  Plavix 75 mg/ day, Lipitor 10 mg QHS. Saw Rehab/ Physiatry for a couple visits. Never pursued Botox injection for spasticity due to potential worsening of gait. Pt now taking Baclofen 10 mg TID and says it's helped her walking. 2) Mixed alzheimer's-vascular dementia  Cognitive difficulty started after stroke. No noticeable worsening per daughter and pt. Continues on Aricept 10 mg QHS (couldn't tolerate Namenda)     3) Depression  Pt says mood is unchanged, denies feeling depressed or easily irritated. Currently on Zoloft 150 mg/ day    4) Chronic tension headaches, episodic migraine without aura  Increased Propranolol LA to 120 mg/ day at last visit. Now saying not having daily headache and maybe only 4-5 days of headache a month. She says not having severe headaches often, thinks it's been months since last migraine. Pt uses Fioricet periodically for moderate to severe headache. She       Brief ROS: as above or otherwise negative    =================  Brief Hx: Right MCA territory infarction in June 2015, treated at [facility], resultant mild left hemiparesis/ spastic gait. CTA H+N: no aneurysm, mild/ minimal ICA stenosis, right MCA narrowing consistent with prior infarction. Seen by Neuropsychology/ Dr. Ant Mccracken. Dx with mild-moderate dementia (vascular and/or alzheimer's), mild depression.        Allergies   Allergen Reactions    Namenda [Memantine] Other (comments)     Side effects     Current Outpatient Medications   Medication Sig Dispense Refill    propranolol LA (INDERAL LA) 120 mg SR capsule Take 1 Cap by mouth daily. 30 Cap 6    butalbital-acetaminophen-caffeine (FIORICET, ESGIC) -40 mg per tablet TAKE 1-2 TAB BY MOUTH AT ONSET OF HEADACHE. MAY REPEAT IN 4 HOURS AS NEEDED. MAX 3 TIMES PER WEEK. 30 Tab 1    donepezil (ARICEPT) 10 mg tablet Take 1 Tab by mouth nightly. Memory medication 30 Tab 6    lisinopril (PRINIVIL, ZESTRIL) 10 mg tablet Take 1 Tab by mouth daily. 90 Tab 3    glucose blood VI test strips (BLOOD GLUCOSE TEST) strip Test daily or as directed. PLEASE PROVIDE ONE TOUCH ULTRA BLUE TEST STRIPS. 100 Strip 3    atorvastatin (LIPITOR) 10 mg tablet TAKE 1 TAB BY MOUTH DAILY. 90 Tab 1    Blood-Glucose Meter (TRUE METRIX GLUCOSE METER) misc Dx Code-E11.9 Type 2 Diabetes. 1 Each 0    ONETOUCH ULTRA2 monitoring kit TEST BLOOD SUGAR DAILY. DX. E11.9 1 Kit 0    metFORMIN ER (GLUCOPHAGE XR) 500 mg tablet Take 1 Tab by mouth daily (with dinner). 90 Tab 1    lancets 28 gauge misc Test Blood Sugar daily. Diagnoses code: E11.9 100 Lancet 3    baclofen (LIORESAL) 10 mg tablet TAKE 1 TABLET EVERY MORNING FOR 1 WEEK THEN INCREASE TO 1 TABLET TWICE A DAY. STOP IF SEDATION. 1    clopidogrel (PLAVIX) 75 mg tab Take 1 Tab by mouth daily. 90 Tab 3    sertraline (ZOLOFT) 100 mg tablet Take 1.5 Tabs by mouth daily. For depression. 90 Tab 5    multivitamin (ONE A DAY) tablet Take 1 Tab by mouth daily. Past Medical History:   Diagnosis Date    Chronic gout of multiple sites 10/24/2017    Depression     on Zoloft    Diabetes [facility])     Essential hypertension 6/2/2016    Essential hypertension 10/24/2017    History of cerebral aneurysm repair 11/10/2015    History of stroke 03/06/2015    Migraine without aura and without status migrainosus, not intractable 12/11/2017    Spastic hemiplegia due to cerebrovascular disease ([address] 2/17/2018    Vascular dementia        No past surgical history on file.     Family History   Problem Relation Age of Onset    Heart Attack Mother     Heart Attack Father     Headache Sister     Migraines Sister     Stroke Sister     Headache Brother     Migraines Brother        Social History     Socioeconomic History    Marital status:      Spouse name: Not on file    Number of children: Not on file    Years of education: Not on file    Highest education level: Not on file   Social Needs    Financial resource strain: Not on file    Food insecurity - worry: Not on file    Food insecurity - inability: Not on file   [employer] needs - medical: Not on file   [employer] needs - non-medical: Not on file   Occupational History    Not on file   Tobacco Use    Smoking status: Former Smoker     Last attempt to quit: 2015     Years since quitting: 3.5    Smokeless tobacco: Never Used    Tobacco comment: Never used vapor or e-cigs    Substance and Sexual Activity    Alcohol use: No     Alcohol/week: 0.0 oz    Drug use: No    Sexual activity: Yes     Partners: Male     Birth control/protection: None   Other Topics Concern    Not on file   Social History Narrative    Not on file         Physical Exam  Blood pressure 138/90, resp. rate 20, height 5' 6\" (1.676 m), weight 52.6 kg (116 lb). No acute distress, resting, NAD  Neck: no stiffness  Skin: no rashes    Focused Neurological Exam     Mental status: Alert and oriented to person, place situation. Language: normal fluency and comprehension; no dysarthria. CNs:   Visual fields grossly normal  Extraocular movements intact, no nystagmus  Face appears symmetric and facial strength normal.    Hearing is intact to casual conversation.      Sensory: reduced LT in left arm; leg not tested today    Motor:  Left arm and le/ 5  Right arm and le/ 5  Left side spasticity    Reflexes: increased on left compared to right  Gait: spastic left hemparesis    Impression      ICD-10-CM ICD-9-CM    1. Vascular dementia without behavioral disturbance F01.50 290.40 donepezil (ARICEPT) 10 mg tablet        Mixed mild-moderate vascular-alzheimer's dementia by cognitive testing  Chronic tension headache  Depression (mild to moderate)  Left sided spasticity    Renewed Rx Aricept 10 mg QHS  (didn't tolerate Namenda). Continue Zoloft 150 mg QHS. Continue Propranolol  mg/ day (renewed Rx). Continue Fioricet prn moderate to severe HA (renewed Rx, #30 tabs + 1 RF, should last 6 months).   Follow up in 6 months        Signed By: Narayan Forbes MD     February 14, 2019

## 2019-02-14 NOTE — PROGRESS NOTES
Patient is here for a follow up for headaches and vascular dementia. She states that she has been feeling okay. Only having headaches every now and then, not too often. Memory is fairly well. No complaints or concerns at this time.

## 2019-02-17 RX ORDER — CLOPIDOGREL BISULFATE 75 MG/1
TABLET ORAL
Qty: 90 TAB | Refills: 3 | Status: SHIPPED | OUTPATIENT
Start: 2019-02-17 | End: 2020-03-24

## 2019-02-20 NOTE — PROGRESS NOTES
HISTORY OF PRESENT ILLNESS  Derik Helm is a 62 y.o. female. Derik Helm is here for diabetic follow up. Their  FBS have been around - she forgot her book. This is a chronic problem which has not changed. Denies chest pain, abdominal pain, headaches, or shortness of breath. It is worsened by nothing, and improved by metformin, which has worked well in the past.    Evidently, she bumped and injured her left foot causing her knee to swell about 2 weeks ago. She went to [facility] and she was given an ACE for the knee. It is better, but still hurts when it she walks. She has a history of a CVA and wearing her AFO on that side makes it better. Denies swelling, redness, warmth, locking or giving way. She hasn't taken her blood pressure medication yet today. Review of Systems   Constitutional: Negative for weight loss. No weight gain   Eyes: Negative for blurred vision. Respiratory: Negative for shortness of breath. Cardiovascular: Negative for chest pain and leg swelling. Gastrointestinal: Negative for abdominal pain. Genitourinary:        No polyuria   Neurological: Positive for focal weakness. Negative for dizziness, sensory change and speech change. Endo/Heme/Allergies: Negative for polydipsia. Lab Results   Component Value Date/Time    Hemoglobin A1c 6.8 (H) 10/04/2018 09:05 AM         Visit Vitals  /80   Pulse 62   Temp 97.5 °F (36.4 °C) (Oral)   Resp 18   Ht 5' 6\" (1.676 m)   Wt 124 lb (56.2 kg)   BMI 20.01 kg/m²     BP Readings from Last 3 Encounters:   02/21/19 160/80   02/14/19 138/90   10/18/18 136/65     Physical Exam   Constitutional: She is oriented to person, place, and time. She appears well-developed and well-nourished. No distress. Cardiovascular: Normal rate, regular rhythm, normal heart sounds and intact distal pulses. Exam reveals no gallop and no friction rub. No murmur heard.   Pulmonary/Chest: Effort normal and breath sounds normal. No respiratory distress. She has no wheezes. She has no rales. Musculoskeletal:        Left knee: She exhibits swelling. She exhibits normal range of motion, no effusion, no deformity, no erythema, no LCL laxity, no bony tenderness, normal meniscus and no MCL laxity. No tenderness found. Neurological: She is alert and oriented to person, place, and time. Normal monofilament exam   Skin: Skin is warm and dry. She is not diaphoretic. Feet and nails in good condition. Nursing note and vitals reviewed. ASSESSMENT and PLAN    ICD-10-CM ICD-9-CM    1. Type 2 diabetes mellitus without complication, without long-term current use of insulin ([facility]) E11.9 250.00 HM DIABETES FOOT EXAM      METABOLIC PANEL, BASIC      NMR LIPOPROFILE      HEPATIC FUNCTION PANEL      HEMOGLOBIN A1C WITH EAG      MICROALBUMIN, UR, RAND W/ MICROALB/CREAT RATIO   2. Essential hypertension Z01 462.4 METABOLIC PANEL, BASIC   3. Knee strain, left, initial encounter S86.912A 863.1         Uncertain diabetic control  Blood pressure elevated due lack of medication  Knee improving as expected  Labs per orders. Continue current plans. Rest, ice, elevation, Tylenol prn for knee    Follow-up Disposition:  Return in about 4 months (around 6/21/2019) for diabetes, blood pressure. Reviewed plan of care. Patient has provided input and agrees with goals.

## 2019-02-21 ENCOUNTER — OFFICE VISIT (OUTPATIENT)
Dept: FAMILY MEDICINE CLINIC | Age: 59
End: 2019-02-21

## 2019-02-21 VITALS
SYSTOLIC BLOOD PRESSURE: 160 MMHG | RESPIRATION RATE: 18 BRPM | HEART RATE: 62 BPM | DIASTOLIC BLOOD PRESSURE: 80 MMHG | BODY MASS INDEX: 19.93 KG/M2 | WEIGHT: 124 LBS | TEMPERATURE: 97.5 F | HEIGHT: 66 IN

## 2019-02-21 DIAGNOSIS — S86.912A KNEE STRAIN, LEFT, INITIAL ENCOUNTER: ICD-10-CM

## 2019-02-21 DIAGNOSIS — I10 ESSENTIAL HYPERTENSION: ICD-10-CM

## 2019-02-21 DIAGNOSIS — E11.9 TYPE 2 DIABETES MELLITUS WITHOUT COMPLICATION, WITHOUT LONG-TERM CURRENT USE OF INSULIN (HCC): Primary | ICD-10-CM

## 2019-02-21 NOTE — PROGRESS NOTES
Chief Complaint   Patient presents with    Diabetes     4 mo f/u     Chief Complaint   Patient presents with    Diabetes     4 mo f/u    Knee Swelling     left       1. Have you been to the ER, urgent care clinic since your last visit? Hospitalized since your last visit? Yes [facility] 02/15/19 for left foot and knee injury    2. Have you seen or consulted any other health care providers outside of the [address] [place] [address] since your last visit? Include any pap smears or colon screening.  No

## 2019-04-12 RX ORDER — ATORVASTATIN CALCIUM 10 MG/1
TABLET, FILM COATED ORAL
Qty: 90 TAB | Refills: 0 | Status: SHIPPED | OUTPATIENT
Start: 2019-04-12 | End: 2019-07-14 | Stop reason: SDUPTHER

## 2019-05-02 RX ORDER — BUTALBITAL, ACETAMINOPHEN AND CAFFEINE 50; 325; 40 MG/1; MG/1; MG/1
TABLET ORAL
Qty: 30 TAB | Refills: 1 | Status: SHIPPED | OUTPATIENT
Start: 2019-05-02 | End: 2019-09-19 | Stop reason: SDUPTHER

## 2019-07-14 DIAGNOSIS — E78.00 HYPERCHOLESTEROLEMIA: Primary | ICD-10-CM

## 2019-07-15 RX ORDER — ATORVASTATIN CALCIUM 10 MG/1
TABLET, FILM COATED ORAL
Qty: 90 TAB | Refills: 0 | Status: SHIPPED | OUTPATIENT
Start: 2019-07-15 | End: 2019-10-11 | Stop reason: SDUPTHER

## 2019-07-16 NOTE — TELEPHONE ENCOUNTER
Please call patient and remind them to have the cholesterol labs I ordered in May done. I cannot continue to refill their medications until these have been done.

## 2019-08-11 NOTE — PATIENT INSTRUCTIONS
Medicare Wellness Visit, Female The best way to live healthy is to have a lifestyle where you eat a well-balanced diet, exercise regularly, limit alcohol use, and quit all forms of tobacco/nicotine, if applicable. Regular preventive services are another way to keep healthy. Preventive services (vaccines, screening tests, monitoring & exams) can help personalize your care plan, which helps you manage your own care. Screening tests can find health problems at the earliest stages, when they are easiest to treat. [facility] follows the current, evidence-based guidelines published by the [facility] Perry Gage ([facility]) when recommending preventive services for our patients. Because we follow these guidelines, sometimes recommendations change over time as research supports it. (For example, mammograms used to be recommended annually. Even though Medicare will still pay for an annual mammogram, the newer guidelines recommend a mammogram every two years for women of average risk.) Of course, you and your doctor may decide to screen more often for some diseases, based on your risk and your health status. Preventive services for you include: - Medicare offers their members a free annual wellness visit, which is time for you and your primary care provider to discuss and plan for your preventive service needs. Take advantage of this benefit every year! 
-All adults over the age of 72 should receive the recommended pneumonia vaccines. Current USPSTF guidelines recommend a series of two vaccines for the best pneumonia protection.  
-All adults should have a flu vaccine yearly and a tetanus vaccine every 10 years. All adults age 61 and older should receive a shingles vaccine once in their lifetime.   
-A bone mass density test is recommended when a woman turns 65 to screen for osteoporosis. This test is only recommended one time, as a screening. Some providers will use this same test as a disease monitoring tool if you already have osteoporosis. -All adults age 38-68 who are overweight should have a diabetes screening test once every three years.  
-Other screening tests and preventive services for persons with diabetes include: an eye exam to screen for diabetic retinopathy, a kidney function test, a foot exam, and stricter control over your cholesterol.  
-Cardiovascular screening for adults with routine risk involves an electrocardiogram (ECG) at intervals determined by your doctor.  
-Colorectal cancer screenings should be done for adults age 54-65 with no increased risk factors for colorectal cancer. There are a number of acceptable methods of screening for this type of cancer. Each test has its own benefits and drawbacks. Discuss with your doctor what is most appropriate for you during your annual wellness visit. The different tests include: colonoscopy (considered the best screening method), a fecal occult blood test, a fecal DNA test, and sigmoidoscopy. -Breast cancer screenings are recommended every other year for women of normal risk, age 54-69. 
-Cervical cancer screenings for women over age 72 are only recommended with certain risk factors.  
-All adults born between [facility] should be screened once for Hepatitis C. Here is a list of your current Health Maintenance items (your personalized list of preventive services) with a due date: 
Health Maintenance Due Topic Date Due  Pneumococcal Vaccine (1 of 1 - PPSV23) 04/24/1966  Shingles Vaccine (1 of 2) 04/24/2010  Mammogram  05/23/2018 [address] [place] [address] Annual Well Visit  10/13/2018  Albumin Urine Test  12/11/2018  Cholesterol Test   02/14/2019  Hemoglobin A1C    04/04/2019  Flu Vaccine  08/01/2019

## 2019-08-14 ENCOUNTER — OFFICE VISIT (OUTPATIENT)
Dept: FAMILY MEDICINE CLINIC | Age: 59
End: 2019-08-14

## 2019-08-14 DIAGNOSIS — Z12.31 ENCOUNTER FOR SCREENING MAMMOGRAM FOR MALIGNANT NEOPLASM OF BREAST: ICD-10-CM

## 2019-08-14 DIAGNOSIS — E11.9 TYPE 2 DIABETES MELLITUS WITHOUT COMPLICATION, WITHOUT LONG-TERM CURRENT USE OF INSULIN (HCC): Primary | ICD-10-CM

## 2019-08-14 DIAGNOSIS — Z00.00 MEDICARE ANNUAL WELLNESS VISIT, SUBSEQUENT: ICD-10-CM

## 2019-08-14 DIAGNOSIS — I10 ESSENTIAL HYPERTENSION: ICD-10-CM

## 2019-09-19 ENCOUNTER — OFFICE VISIT (OUTPATIENT)
Dept: NEUROLOGY | Age: 59
End: 2019-09-19

## 2019-09-19 VITALS
DIASTOLIC BLOOD PRESSURE: 64 MMHG | OXYGEN SATURATION: 94 % | BODY MASS INDEX: 21.44 KG/M2 | WEIGHT: 133.4 LBS | RESPIRATION RATE: 16 BRPM | HEART RATE: 74 BPM | SYSTOLIC BLOOD PRESSURE: 146 MMHG | HEIGHT: 66 IN

## 2019-09-19 DIAGNOSIS — F01.50 VASCULAR DEMENTIA WITHOUT BEHAVIORAL DISTURBANCE (HCC): Primary | ICD-10-CM

## 2019-09-19 DIAGNOSIS — G44.219 EPISODIC TENSION-TYPE HEADACHE, NOT INTRACTABLE: ICD-10-CM

## 2019-09-19 DIAGNOSIS — F32.A DEPRESSION, UNSPECIFIED DEPRESSION TYPE: ICD-10-CM

## 2019-09-19 DIAGNOSIS — G81.14 LEFT SPASTIC HEMIPARESIS (HCC): ICD-10-CM

## 2019-09-19 RX ORDER — PROPRANOLOL HYDROCHLORIDE 120 MG/1
120 CAPSULE, EXTENDED RELEASE ORAL DAILY
Qty: 90 CAP | Refills: 1 | Status: SHIPPED | OUTPATIENT
Start: 2019-09-19 | End: 2019-12-18

## 2019-09-19 RX ORDER — DONEPEZIL HYDROCHLORIDE 10 MG/1
10 TABLET, FILM COATED ORAL
Qty: 90 TAB | Refills: 1 | Status: SHIPPED | OUTPATIENT
Start: 2019-09-19 | End: 2019-12-18

## 2019-09-19 RX ORDER — BUTALBITAL, ACETAMINOPHEN AND CAFFEINE 50; 325; 40 MG/1; MG/1; MG/1
TABLET ORAL
Qty: 30 TAB | Refills: 0 | Status: SHIPPED | OUTPATIENT
Start: 2019-09-19 | End: 2021-11-23

## 2019-09-19 NOTE — PROGRESS NOTES
Interval HPI:   This is a 61 y.o. female with hx of stroke (mild left hemiparesis), mixed alzheimer-vascular dementia (by cognitive testing)      Chief Complaint   Patient presents with    Dementia     vascular dementia f/u     Interval Hx:     Following up for history of stroke (left-sided spastic hemiparesis), mixed Alzheimer's-vascular dementia, depression, episodic tension headaches. She continues taking Plavix 75 mg a day, Lipitor 10 mg nightly. She saw physiatry/rehab regarding potential Botox injections for her left-sided spasticity and says they told her that it would cause increased leg weakness so they did not recommend doing it. He is taking baclofen 10 mg 3 times a day and says that since being on it it significantly reduced her left-sided pain. In terms of memory she feels that her memory has gotten better since her last visit she continues taking Aricept 10 mg at bedtime (could not tolerate Namenda in the past). Says her mood has been pretty good on the Zoloft 150 mg a day, denies feeling depressed recently. In terms of headaches she continues the propranolol  mg a day. She is not having daily headaches anymore and may be 4 or 5 headache days a month. She uses Fioricet on days where the headache is moderate to severe. Brief ROS: as above or otherwise negative    =================  Brief Hx: Right MCA territory infarction in June 2015, treated at [facility], resultant mild left hemiparesis/ spastic gait. CTA H+N: no aneurysm, mild/ minimal ICA stenosis, right MCA narrowing consistent with prior infarction. Seen by Neuropsychology/ Dr. Roberto Mcneal. Dx with mild-moderate dementia (vascular and/or alzheimer's), mild depression. Allergies   Allergen Reactions    Namenda [Memantine] Other (comments)     Side effects     Current Outpatient Medications   Medication Sig Dispense Refill    donepezil (ARICEPT) 10 mg tablet Take 1 Tab by mouth nightly for 90 days. Memory medication 90 Tab 1    butalbital-acetaminophen-caffeine (FIORICET, ESGIC) -40 mg per tablet Take 1 to 2 tabs at onset of moderate to severe headache. May repeat 1 tablet in 4 hours. Limit use to 2 days per week. 30 Tab 0    propranolol LA (INDERAL LA) 120 mg SR capsule Take 1 Cap by mouth daily for 90 days. 90 Cap 1    atorvastatin (LIPITOR) 10 mg tablet TAKE 1 TAB BY MOUTH DAILY. 90 Tab 0    clopidogrel (PLAVIX) 75 mg tab TAKE 1 TABLET BY MOUTH EVERY DAY 90 Tab 3    lisinopril (PRINIVIL, ZESTRIL) 10 mg tablet Take 1 Tab by mouth daily. 90 Tab 3    glucose blood VI test strips (BLOOD GLUCOSE TEST) strip Test daily or as directed. PLEASE PROVIDE ONE TOUCH ULTRA BLUE TEST STRIPS. 100 Strip 3    Blood-Glucose Meter (TRUE METRIX GLUCOSE METER) misc Dx Code-E11.9 Type 2 Diabetes. 1 Each 0    ONETOUCH ULTRA2 monitoring kit TEST BLOOD SUGAR DAILY. DX. E11.9 1 Kit 0    metFORMIN ER (GLUCOPHAGE XR) 500 mg tablet Take 1 Tab by mouth daily (with dinner). 90 Tab 1    lancets 28 gauge misc Test Blood Sugar daily. Diagnoses code: E11.9 100 Lancet 3    baclofen (LIORESAL) 10 mg tablet TAKE 1 TABLET EVERY MORNING FOR 1 WEEK THEN INCREASE TO 1 TABLET TWICE A DAY. STOP IF SEDATION. 1    sertraline (ZOLOFT) 100 mg tablet Take 1.5 Tabs by mouth daily. For depression. 90 Tab 5    multivitamin (ONE A DAY) tablet Take 1 Tab by mouth daily. PMHx:   Past Medical History:   Diagnosis Date    Chronic gout of multiple sites 10/24/2017    Depression     on Zoloft    Diabetes [facility])     Essential hypertension 6/2/2016    Essential hypertension 10/24/2017    History of cerebral aneurysm repair 11/10/2015    History of stroke 03/06/2015    Migraine without aura and without status migrainosus, not intractable 12/11/2017    Spastic hemiplegia due to cerebrovascular disease ([facility] 2/17/2018    Vascular dementia      PSHx:  has no past surgical history on file.     SocHx:  reports that she quit smoking about 4 years ago. She has never used smokeless tobacco. She reports that she does not drink alcohol or use drugs. FHx: family history includes Headache in her brother and sister; Heart Attack in her father and mother; Migraines in her brother and sister; Stroke in her sister. Physical Exam  Blood pressure 146/64, pulse 74, resp. rate 16, height 5' 6\" (1.676 m), weight 60.5 kg (133 lb 6.4 oz), SpO2 94 %. No acute distress, resting, NAD  Neck: no stiffness  Skin: no rashes    Focused Neurological Exam     Mental status: Alert and oriented to person, place situation. Language: normal fluency and comprehension; no dysarthria. CNs:   Visual fields grossly normal  Extraocular movements intact, no nystagmus  Face appears symmetric and facial strength normal.    Hearing is intact to casual conversation. Sensory: reduced LT on left side, normal on right side    Motor:  Left arm and le/ 5  Right arm and le/ 5  Left side spasticity    Reflexes:3+ on left, 2+ on right    Gait: spastic left hemparesis    Impression      ICD-10-CM ICD-9-CM    1. Vascular dementia without behavioral disturbance F01.50 290.40 donepezil (ARICEPT) 10 mg tablet   2. Left spastic hemiparesis (HCC) G81.14 342.10    3. Episodic tension-type headache, not intractable G44.219 339.11 butalbital-acetaminophen-caffeine (FIORICET, ESGIC) -40 mg per tablet      propranolol LA (INDERAL LA) 120 mg SR capsule   4. Depression, unspecified depression type F32.9 311         Continue Aricept 10 mg at bedtime for dementia. Encourage patient to keep blood pressure under control to reduce the chance of future TIA or stroke and reduce progression of her vascular dementia. Continue Zoloft 150 mg QHS for stroke related depression. Continue Propranolol  mg/ day blood pressure and headache reduction. Continue Fioricet as needed moderate to severe headache. Renewed Fioricet prn moderate to severe HA (30 tablets, no RF) .   Follow up in 6 months    Signed By: Jose Guadalupe Quigley MD     September 20, 2019

## 2019-10-03 ENCOUNTER — TELEPHONE (OUTPATIENT)
Dept: FAMILY MEDICINE CLINIC | Age: 59
End: 2019-10-03

## 2019-10-03 NOTE — TELEPHONE ENCOUNTER
----- Message from Lori Mcghee sent at 10/2/2019 12:39 PM EDT -----  Regarding: Dr. Petar House Message/Vendor Calls    Caller's first and last name: Merissa Karl      Reason for call:lab work    Callback required yes/no and why:yes      Best contact number(s): [phone]      Details to clarify the request: Pt would like to know if her lab orders are still in the system that was written in May.       Lori Mcghee

## 2019-10-03 NOTE — TELEPHONE ENCOUNTER
Called and spoke with pt, and she has been advised and states understanding that lab slips from 02/2019 are in system. Pt requests lab slips be mailed to her home, this has been done.

## 2019-10-09 LAB
ALBUMIN SERPL-MCNC: 4.6 G/DL (ref 3.5–5.5)
ALBUMIN/CREAT UR: 51.8 MG/G CREAT (ref 0–30)
ALP SERPL-CCNC: 145 IU/L (ref 39–117)
ALT SERPL-CCNC: 16 IU/L (ref 0–32)
AST SERPL-CCNC: 15 IU/L (ref 0–40)
BILIRUB DIRECT SERPL-MCNC: 0.1 MG/DL (ref 0–0.4)
BILIRUB SERPL-MCNC: 0.2 MG/DL (ref 0–1.2)
BUN SERPL-MCNC: 12 MG/DL (ref 6–24)
BUN/CREAT SERPL: 17 (ref 9–23)
CALCIUM SERPL-MCNC: 9.8 MG/DL (ref 8.7–10.2)
CHLORIDE SERPL-SCNC: 104 MMOL/L (ref 96–106)
CHOLEST SERPL-MCNC: 110 MG/DL (ref 100–199)
CO2 SERPL-SCNC: 22 MMOL/L (ref 20–29)
CREAT SERPL-MCNC: 0.7 MG/DL (ref 0.57–1)
CREAT UR-MCNC: 102.1 MG/DL
EST. AVERAGE GLUCOSE BLD GHB EST-MCNC: 197 MG/DL
GLUCOSE SERPL-MCNC: 175 MG/DL (ref 65–99)
HBA1C MFR BLD: 8.5 % (ref 4.8–5.6)
HDL SERPL-SCNC: 33.2 UMOL/L
HDLC SERPL-MCNC: 47 MG/DL
INTERPRETATION, 910389: NORMAL
LDL SERPL QN: 19.8 NM
LDL SERPL-SCNC: 405 NMOL/L
LDL SMALL SERPL-SCNC: 272 NMOL/L
LDLC SERPL CALC-MCNC: 42 MG/DL (ref 0–99)
LP-IR SCORE SERPL: 42
Lab: NORMAL
MICROALBUMIN UR-MCNC: 52.9 UG/ML
POTASSIUM SERPL-SCNC: 4.3 MMOL/L (ref 3.5–5.2)
PROT SERPL-MCNC: 8 G/DL (ref 6–8.5)
SODIUM SERPL-SCNC: 142 MMOL/L (ref 134–144)
TRIGL SERPL-MCNC: 103 MG/DL (ref 0–149)

## 2019-10-11 DIAGNOSIS — E78.00 HYPERCHOLESTEROLEMIA: ICD-10-CM

## 2019-10-13 RX ORDER — ATORVASTATIN CALCIUM 10 MG/1
TABLET, FILM COATED ORAL
Qty: 90 TAB | Refills: 4 | Status: SHIPPED | OUTPATIENT
Start: 2019-10-13 | End: 2020-10-27 | Stop reason: SDUPTHER

## 2019-10-18 NOTE — TELEPHONE ENCOUNTER
Called pt, and left a voice message, asking that he/she call the office back in regard to his/her recent lab/test results. 39.2

## 2019-10-21 ENCOUNTER — OFFICE VISIT (OUTPATIENT)
Dept: FAMILY MEDICINE CLINIC | Age: 59
End: 2019-10-21

## 2019-10-21 VITALS
HEART RATE: 74 BPM | HEIGHT: 66 IN | RESPIRATION RATE: 18 BRPM | BODY MASS INDEX: 21.38 KG/M2 | WEIGHT: 133 LBS | SYSTOLIC BLOOD PRESSURE: 154 MMHG | TEMPERATURE: 96.6 F | DIASTOLIC BLOOD PRESSURE: 84 MMHG

## 2019-10-21 DIAGNOSIS — Z13.39 SCREENING FOR ALCOHOLISM: ICD-10-CM

## 2019-10-21 DIAGNOSIS — Z00.00 MEDICARE ANNUAL WELLNESS VISIT, SUBSEQUENT: ICD-10-CM

## 2019-10-21 DIAGNOSIS — E11.9 TYPE 2 DIABETES MELLITUS WITHOUT COMPLICATION, WITHOUT LONG-TERM CURRENT USE OF INSULIN (HCC): Primary | ICD-10-CM

## 2019-10-21 DIAGNOSIS — Z13.31 SCREENING FOR DEPRESSION: ICD-10-CM

## 2019-10-21 DIAGNOSIS — I10 ESSENTIAL HYPERTENSION: ICD-10-CM

## 2019-10-21 DIAGNOSIS — F01.50 VASCULAR DEMENTIA WITHOUT BEHAVIORAL DISTURBANCE (HCC): ICD-10-CM

## 2019-10-21 DIAGNOSIS — Z12.31 ENCOUNTER FOR SCREENING MAMMOGRAM FOR MALIGNANT NEOPLASM OF BREAST: ICD-10-CM

## 2019-10-21 RX ORDER — LISINOPRIL 20 MG/1
20 TABLET ORAL DAILY
Qty: 30 TAB | Refills: 1 | Status: SHIPPED | OUTPATIENT
Start: 2019-10-21 | End: 2019-11-20 | Stop reason: SDUPTHER

## 2019-10-21 NOTE — PROGRESS NOTES
Chief Complaint   Patient presents with    Annual Wellness Visit    Diabetes     and bp f/u     1. Have you been to the ER, urgent care clinic since your last visit? Hospitalized since your last visit? No     2. Have you seen or consulted any other health care providers outside of the [address] [place] [address] since your last visit? Include any pap smears or colon screening.  No

## 2019-10-21 NOTE — PROGRESS NOTES
HISTORY OF PRESENT ILLNESS  Lavelle Gilmore is a 61 y.o. female. Lavelle Gilmore is here for diabetic follow up. Her recent A1C was 8.5. This is a chronic problem which has worsened. .  It is improved by metformin, which is/are working moderately. She also is due for follow up on her HTN. States that she takes her medications regularly and that her daughter takes care of this    Also, she has vascular dementia, but she is able to give a fair amount of history. Review of Systems   Constitutional: Negative for weight loss. No weight gain   Eyes: Negative for blurred vision. Respiratory: Negative for shortness of breath. Cardiovascular: Negative for chest pain and leg swelling. Gastrointestinal: Negative for abdominal pain. Genitourinary:        No polyuria   Neurological: Negative for dizziness, sensory change, speech change and focal weakness. Endo/Heme/Allergies: Negative for polydipsia. Lab Results   Component Value Date/Time    Hemoglobin A1c 8.5 (H) 10/08/2019 09:11 AM         Visit Vitals  /84 Comment: right arm, manual cuff   Pulse 74   Temp 96.6 °F (35.9 °C) (Oral)   Resp 18   Ht 5' 6\" (1.676 m)   Wt 133 lb (60.3 kg)   BMI 21.47 kg/m²     BP Readings from Last 3 Encounters:   10/21/19 187/88   09/19/19 146/64   02/21/19 160/80     Physical Exam   Constitutional: She is oriented to person, place, and time. She appears well-developed and well-nourished. No distress. Cardiovascular: Normal rate, regular rhythm and normal heart sounds. Exam reveals no gallop and no friction rub. No murmur heard. Pulmonary/Chest: Effort normal and breath sounds normal. No respiratory distress. She has no wheezes. She has no rales. Musculoskeletal: She exhibits no edema. Neurological: She is alert and oriented to person, place, and time. Skin: Skin is warm and dry. She is not diaphoretic. Nursing note and vitals reviewed. ASSESSMENT and PLAN    ICD-10-CM ICD-9-CM    1.  Type 2 diabetes mellitus without complication, without long-term current use of insulin (HCC) E11.9 250.00 SITagliptin (JANUVIA) 100 mg tablet      lisinopril (PRINIVIL, ZESTRIL) 20 mg tablet   2. Essential hypertension I10 401.9 lisinopril (PRINIVIL, ZESTRIL) 20 mg tablet   3. Vascular dementia without behavioral disturbance (HCC) F01.50 290.40        Uncontrolled diabetes  Blood pressure elevated  Add Januvia  Increase lisinopril    Follow-up and Dispositions    · Return in about 6 weeks (around 12/2/2019) for blood pressure, diabetes, bring blood sugars. Reviewed plan of care. Patient has provided input and agrees with goals. This is the Subsequent Medicare Annual Wellness Exam, performed 12 months or more after the Initial AWV or the last Subsequent AWV    I have reviewed the patient's medical history in detail and updated the computerized patient record. History     Past Medical History:   Diagnosis Date    Chronic gout of multiple sites 10/24/2017    Depression     on Zoloft    Diabetes [facility])     Essential hypertension 6/2/2016    Essential hypertension 10/24/2017    History of cerebral aneurysm repair 11/10/2015    History of stroke 03/06/2015    Migraine without aura and without status migrainosus, not intractable 12/11/2017    Spastic hemiplegia due to cerebrovascular disease ([facility] [address] 2/17/2018    Vascular dementia       No past surgical history on file. Current Outpatient Medications   Medication Sig Dispense Refill    atorvastatin (LIPITOR) 10 mg tablet TAKE 1 TAB BY MOUTH DAILY. 90 Tab 4    donepezil (ARICEPT) 10 mg tablet Take 1 Tab by mouth nightly for 90 days. Memory medication 90 Tab 1    butalbital-acetaminophen-caffeine (FIORICET, ESGIC) -40 mg per tablet Take 1 to 2 tabs at onset of moderate to severe headache. May repeat 1 tablet in 4 hours. Limit use to 2 days per week.  30 Tab 0    propranolol LA (INDERAL LA) 120 mg SR capsule Take 1 Cap by mouth daily for 90 days. 90 Cap 1    clopidogrel (PLAVIX) 75 mg tab TAKE 1 TABLET BY MOUTH EVERY DAY 90 Tab 3    lisinopril (PRINIVIL, ZESTRIL) 10 mg tablet Take 1 Tab by mouth daily. 90 Tab 3    glucose blood VI test strips (BLOOD GLUCOSE TEST) strip Test daily or as directed. PLEASE PROVIDE ONE TOUCH ULTRA BLUE TEST STRIPS. 100 Strip 3    Blood-Glucose Meter (TRUE METRIX GLUCOSE METER) misc Dx Code-E11.9 Type 2 Diabetes. 1 Each 0    ONETOUCH ULTRA2 monitoring kit TEST BLOOD SUGAR DAILY. DX. E11.9 1 Kit 0    metFORMIN ER (GLUCOPHAGE XR) 500 mg tablet Take 1 Tab by mouth daily (with dinner). 90 Tab 1    lancets 28 gauge misc Test Blood Sugar daily. Diagnoses code: E11.9 100 Lancet 3    baclofen (LIORESAL) 10 mg tablet TAKE 1 TABLET EVERY MORNING FOR 1 WEEK THEN INCREASE TO 1 TABLET TWICE A DAY. STOP IF SEDATION. 1    sertraline (ZOLOFT) 100 mg tablet Take 1.5 Tabs by mouth daily. For depression. 90 Tab 5    multivitamin (ONE A DAY) tablet Take 1 Tab by mouth daily.        Allergies   Allergen Reactions    Namenda [Memantine] Other (comments)     Side effects     Family History   Problem Relation Age of Onset    Heart Attack Mother     Heart Attack Father     Headache Sister     Migraines Sister     Stroke Sister     Headache Brother     Migraines Brother      Social History     Tobacco Use    Smoking status: Former Smoker     Last attempt to quit: 2015     Years since quittin.2    Smokeless tobacco: Never Used    Tobacco comment: Never used vapor or e-cigs    Substance Use Topics    Alcohol use: No     Alcohol/week: 0.0 standard drinks     Patient Active Problem List   Diagnosis Code    Status post stroke Z86.73    History of cerebral aneurysm repair Z98.890, Z86.79    Episodic tension-type headache, not intractable G44.219    Essential hypertension I10    Type 2 diabetes mellitus without complication ([address] F72.5    Vascular dementia without behavioral disturbance ([facility] [address] F01.50    Hx of completed stroke Z86.73    Chest pain R07.9    Migraine without aura and without status migrainosus, not intractable G43.009    Moderate single current episode of major depressive disorder (HCC) F32.1    Spastic hemiplegia due to cerebrovascular disease (HCC) I67.9, G81.10       Depression Risk Factor Screening:     3 most recent PHQ Screens 10/21/2019   PHQ Not Done -   Little interest or pleasure in doing things Not at all   Feeling down, depressed, irritable, or hopeless Not at all   Total Score PHQ 2 0     Alcohol Risk Factor Screening:   AUDIT Screen Score: AUDIT Score: 0      Document Brief Intervention (corresponds directly with the 5 A's, Ask, Advise, Assess, Assist, and Arrange):  NA    At- Risk Patients (Score 7-15 for women; 8-15 for men)  Discuss concern patient is drinking at unhealthy levels known to increase risk of alcohol-related health problems. Is Patient ready to commit to change? NA    If No:   Encourage reflection   Discuss short term and long term health risks of consuming alcohol   Barriers to change   Reaffirm willingness to help / Educational materials provided  If Yes:   Set goal  Mentor Me provided    Harmful use or Dependence (Score 16 or greater)   Discuss short term and long term health risks of consuming alcohol   Recommendations   Negotiate drinking goal   Recommend addiction specialist/center   Arrange follow-up appointments. Functional Ability and Level of Safety:   Hearing Loss  Hearing is good.     Activities of Daily Living  ADL Assessment 10/21/2019   Feeding yourself No Help Needed   Getting from bed to chair No Help Needed   Getting dressed No Help Needed   Bathing or showering No Help Needed   Walk across the room (includes cane/walker) No Help Needed   Using the telphone No Help Needed   Taking your medications No Help Needed   Preparing meals No Help Needed   Managing money (expenses/bills) Help Needed   Moderately strenuous housework (laundry) No Help Needed   Shopping for personal items (toiletries/medicines) Help Needed   Shopping for groceries Help Needed   Driving Help Needed   Climbing a flight of stairs No Help Needed   Getting to places beyond walking distances No Help Needed         Fall Risk  Fall Risk Assessment, last 12 mths 10/21/2019   Able to walk? Yes   Fall in past 12 months? No       Abuse Screen  Abuse Screening Questionnaire 10/21/2019   Do you ever feel afraid of your partner? N   Are you in a relationship with someone who physically or mentally threatens you? N   Is it safe for you to go home? Y       Cognitive Screening   Evaluation of Cognitive Function:  Has your family/caregiver stated any concerns about your memory: yes  Abnormal, Clock Drawing test    Patient Care Team   Patient Care Team:  Amira Carranza MD as PCP - General (Family Practice)  Chas Maldonado MD (Neurology)  Sofia White PsyD (Psychology)    Assessment/Plan   Education and counseling provided:  Are appropriate based on today's review and evaluation  End-of-Life planning (with patient's consent)  Patient plans to complete an advanced directive and bring it in  [address] was discussed but the patient did not wish or was not able to name a surrogate decision maker or provide an Advance Care Plan       Diagnoses and all orders for this visit:    1. Type 2 diabetes mellitus without complication, without long-term current use of insulin (HCC)  -     SITagliptin (JANUVIA) 100 mg tablet; Take 1 Tab by mouth daily. -     lisinopril (PRINIVIL, ZESTRIL) 20 mg tablet; Take 1 Tab by mouth daily. 2. Essential hypertension  -     lisinopril (PRINIVIL, ZESTRIL) 20 mg tablet; Take 1 Tab by mouth daily. 3. Vascular dementia without behavioral disturbance ([facility] [address]    4. Medicare annual wellness visit, subsequent    5. Encounter for screening mammogram for malignant neoplasm of breast  -     SIVAKUMAR MAMMO BI SCREENING INCL CAD; Future    6.  Screening for alcoholism  -     OK ANNUAL ALCOHOL SCREEN 15 MIN    7. Screening for depression  -     Francisc[place] Maintenance Due   Topic Date Due    Pneumococcal 0-64 years (1 of 1 - PPSV23) 04/24/1966    Shingrix Vaccine Age 50> (1 of 2) 04/24/2010    BREAST CANCER SCRN MAMMOGRAM  05/23/2018    MEDICARE YEARLY EXAM  10/13/2018    Influenza Age 9 to Adult  08/01/2019

## 2019-10-21 NOTE — PATIENT INSTRUCTIONS
Medicare Wellness Visit, Female     The best way to live healthy is to have a lifestyle where you eat a well-balanced diet, exercise regularly, limit alcohol use, and quit all forms of tobacco/nicotine, if applicable. Regular preventive services are another way to keep healthy. Preventive services (vaccines, screening tests, monitoring & exams) can help personalize your care plan, which helps you manage your own care. Screening tests can find health problems at the earliest stages, when they are easiest to treat. [facility] follows the current, evidence-based guidelines published by the [facility]age ([facility]) when recommending preventive services for our patients. Because we follow these guidelines, sometimes recommendations change over time as research supports it. (For example, mammograms used to be recommended annually. Even though Medicare will still pay for an annual mammogram, the newer guidelines recommend a mammogram every two years for women of average risk.)  Of course, you and your doctor may decide to screen more often for some diseases, based on your risk and your health status. Preventive services for you include:  - Medicare offers their members a free annual wellness visit, which is time for you and your primary care provider to discuss and plan for your preventive service needs. Take advantage of this benefit every year!  -All adults over the age of 72 should receive the recommended pneumonia vaccines. Current USPSTF guidelines recommend a series of two vaccines for the best pneumonia protection.   -All adults should have a flu vaccine yearly and a tetanus vaccine every 10 years. All adults age 61 and older should receive a shingles vaccine once in their lifetime.    -A bone mass density test is recommended when a woman turns 65 to screen for osteoporosis. This test is only recommended one time, as a screening.  Some providers will use this same test as a disease monitoring tool if you already have osteoporosis. -All adults age 38-68 who are overweight should have a diabetes screening test once every three years.   -Other screening tests and preventive services for persons with diabetes include: an eye exam to screen for diabetic retinopathy, a kidney function test, a foot exam, and stricter control over your cholesterol.   -Cardiovascular screening for adults with routine risk involves an electrocardiogram (ECG) at intervals determined by your doctor.   -Colorectal cancer screenings should be done for adults age 54-65 with no increased risk factors for colorectal cancer. There are a number of acceptable methods of screening for this type of cancer. Each test has its own benefits and drawbacks. Discuss with your doctor what is most appropriate for you during your annual wellness visit. The different tests include: colonoscopy (considered the best screening method), a fecal occult blood test, a fecal DNA test, and sigmoidoscopy. -Breast cancer screenings are recommended every other year for women of normal risk, age 54-69.  -Cervical cancer screenings for women over age 72 are only recommended with certain risk factors.   -All adults born between [facility] should be screened once for Hepatitis C. Here is a list of your current Health Maintenance items (your personalized list of preventive services) with a due date:  Health Maintenance Due   Topic Date Due    Pneumococcal Vaccine (1 of 1 - PPSV23) 04/24/1966    Shingles Vaccine (1 of 2) 04/24/2010    Mammogram  05/23/2018    Annual Well Visit  10/13/2018    Flu Vaccine  08/01/2019         Medicare Wellness Visit, Female     The best way to live healthy is to have a lifestyle where you eat a well-balanced diet, exercise regularly, limit alcohol use, and quit all forms of tobacco/nicotine, if applicable. Regular preventive services are another way to keep healthy.  Preventive services (vaccines, screening tests, monitoring & exams) can help personalize your care plan, which helps you manage your own care. Screening tests can find health problems at the earliest stages, when they are easiest to treat. [facility] follows the current, evidence-based guidelines published by the [facility] [place] ([facility]) when recommending preventive services for our patients. Because we follow these guidelines, sometimes recommendations change over time as research supports it. (For example, mammograms used to be recommended annually. Even though Medicare will still pay for an annual mammogram, the newer guidelines recommend a mammogram every two years for women of average risk.)  Of course, you and your doctor may decide to screen more often for some diseases, based on your risk and your health status. Preventive services for you include:  - Medicare offers their members a free annual wellness visit, which is time for you and your primary care provider to discuss and plan for your preventive service needs. Take advantage of this benefit every year!  -All adults over the age of 72 should receive the recommended pneumonia vaccines. Current USPSTF guidelines recommend a series of two vaccines for the best pneumonia protection.   -All adults should have a flu vaccine yearly and a tetanus vaccine every 10 years. All adults age 61 and older should receive a shingles vaccine once in their lifetime.    -A bone mass density test is recommended when a woman turns 65 to screen for osteoporosis. This test is only recommended one time, as a screening. Some providers will use this same test as a disease monitoring tool if you already have osteoporosis.   -All adults age 38-68 who are overweight should have a diabetes screening test once every three years.   -Other screening tests and preventive services for persons with diabetes include: an eye exam to screen for diabetic retinopathy, a kidney function test, a foot exam, and stricter control over your cholesterol.   -Cardiovascular screening for adults with routine risk involves an electrocardiogram (ECG) at intervals determined by your doctor.   -Colorectal cancer screenings should be done for adults age 54-65 with no increased risk factors for colorectal cancer. There are a number of acceptable methods of screening for this type of cancer. Each test has its own benefits and drawbacks. Discuss with your doctor what is most appropriate for you during your annual wellness visit. The different tests include: colonoscopy (considered the best screening method), a fecal occult blood test, a fecal DNA test, and sigmoidoscopy. -Breast cancer screenings are recommended every other year for women of normal risk, age 54-69.  -Cervical cancer screenings for women over age 72 are only recommended with certain risk factors.   -All adults born between [facility] should be screened once for Hepatitis C. Here is a list of your current Health Maintenance items (your personalized list of preventive services) with a due date:  Health Maintenance Due   Topic Date Due    Pneumococcal Vaccine (1 of 1 - PPSV23) 04/24/1966    Shingles Vaccine (1 of 2) 04/24/2010    Mammogram  05/23/2018    Flu Vaccine  08/01/2019         Medicare Wellness Visit, Female     The best way to live healthy is to have a lifestyle where you eat a well-balanced diet, exercise regularly, limit alcohol use, and quit all forms of tobacco/nicotine, if applicable. Regular preventive services are another way to keep healthy. Preventive services (vaccines, screening tests, monitoring & exams) can help personalize your care plan, which helps you manage your own care. Screening tests can find health problems at the earliest stages, when they are easiest to treat.    [facility] follows the current, evidence-based guidelines published by the [place] [facility] [employer] Gage (USPSTF) when recommending preventive services for our patients. Because we follow these guidelines, sometimes recommendations change over time as research supports it. (For example, mammograms used to be recommended annually. Even though Medicare will still pay for an annual mammogram, the newer guidelines recommend a mammogram every two years for women of average risk.)  Of course, you and your doctor may decide to screen more often for some diseases, based on your risk and your health status. Preventive services for you include:  - Medicare offers their members a free annual wellness visit, which is time for you and your primary care provider to discuss and plan for your preventive service needs. Take advantage of this benefit every year!  -All adults over the age of 72 should receive the recommended pneumonia vaccines. Current USPSTF guidelines recommend a series of two vaccines for the best pneumonia protection.   -All adults should have a flu vaccine yearly and a tetanus vaccine every 10 years. All adults age 61 and older should receive a shingles vaccine once in their lifetime.    -A bone mass density test is recommended when a woman turns 65 to screen for osteoporosis. This test is only recommended one time, as a screening. Some providers will use this same test as a disease monitoring tool if you already have osteoporosis. -All adults age 38-68 who are overweight should have a diabetes screening test once every three years.   -Other screening tests and preventive services for persons with diabetes include: an eye exam to screen for diabetic retinopathy, a kidney function test, a foot exam, and stricter control over your cholesterol.   -Cardiovascular screening for adults with routine risk involves an electrocardiogram (ECG) at intervals determined by your doctor.   -Colorectal cancer screenings should be done for adults age 54-65 with no increased risk factors for colorectal cancer.   There are a number of acceptable methods of screening for this type of cancer. Each test has its own benefits and drawbacks. Discuss with your doctor what is most appropriate for you during your annual wellness visit. The different tests include: colonoscopy (considered the best screening method), a fecal occult blood test, a fecal DNA test, and sigmoidoscopy. -Breast cancer screenings are recommended every other year for women of normal risk, age 54-69.  -Cervical cancer screenings for women over age 72 are only recommended with certain risk factors.   -All adults born between [facility] should be screened once for Hepatitis C.      Here is a list of your current Health Maintenance items (your personalized list of preventive services) with a due date:  Health Maintenance Due   Topic Date Due    Pneumococcal Vaccine (1 of 1 - PPSV23) 04/24/1966    Shingles Vaccine (1 of 2) 04/24/2010    Mammogram  05/23/2018    Flu Vaccine  08/01/2019

## 2019-11-20 DIAGNOSIS — E11.9 TYPE 2 DIABETES MELLITUS WITHOUT COMPLICATION, WITHOUT LONG-TERM CURRENT USE OF INSULIN (HCC): ICD-10-CM

## 2019-11-20 DIAGNOSIS — I10 ESSENTIAL HYPERTENSION: ICD-10-CM

## 2019-11-20 RX ORDER — LISINOPRIL 20 MG/1
TABLET ORAL
Qty: 30 TAB | Refills: 1 | Status: SHIPPED | OUTPATIENT
Start: 2019-11-20 | End: 2019-12-16 | Stop reason: SDUPTHER

## 2019-12-16 DIAGNOSIS — E11.9 TYPE 2 DIABETES MELLITUS WITHOUT COMPLICATION, WITHOUT LONG-TERM CURRENT USE OF INSULIN (HCC): ICD-10-CM

## 2019-12-16 DIAGNOSIS — I10 ESSENTIAL HYPERTENSION: ICD-10-CM

## 2019-12-18 RX ORDER — LISINOPRIL 20 MG/1
TABLET ORAL
Qty: 30 TAB | Refills: 1 | Status: SHIPPED | OUTPATIENT
Start: 2019-12-18 | End: 2020-01-09

## 2019-12-18 NOTE — TELEPHONE ENCOUNTER
Please call patient and schedule them for a blood pressure and diabetic follow up. She needs to bring her blood sugars. Let them know I cannot continue to refill their medications until they come in. Eren Martin

## 2019-12-18 NOTE — TELEPHONE ENCOUNTER
Called pt, and left a voice message, that he/she is due for their follow up appointment, here at [facility]. Advised pt to call the office back to schedule their appointment.

## 2019-12-31 DIAGNOSIS — E11.9 TYPE 2 DIABETES MELLITUS WITHOUT COMPLICATION, WITHOUT LONG-TERM CURRENT USE OF INSULIN (HCC): ICD-10-CM

## 2019-12-31 RX ORDER — SITAGLIPTIN 100 MG/1
TABLET, FILM COATED ORAL
Qty: 30 TAB | Refills: 1 | Status: SHIPPED | OUTPATIENT
Start: 2019-12-31 | End: 2020-03-02 | Stop reason: ALTCHOICE

## 2020-01-09 DIAGNOSIS — E11.9 TYPE 2 DIABETES MELLITUS WITHOUT COMPLICATION, WITHOUT LONG-TERM CURRENT USE OF INSULIN (HCC): ICD-10-CM

## 2020-01-09 DIAGNOSIS — I10 ESSENTIAL HYPERTENSION: ICD-10-CM

## 2020-01-09 RX ORDER — LISINOPRIL 20 MG/1
TABLET ORAL
Qty: 30 TAB | Refills: 1 | Status: SHIPPED | OUTPATIENT
Start: 2020-01-09 | End: 2020-02-20 | Stop reason: SDUPTHER

## 2020-02-20 ENCOUNTER — OFFICE VISIT (OUTPATIENT)
Dept: FAMILY MEDICINE CLINIC | Age: 60
End: 2020-02-20

## 2020-02-20 VITALS
HEIGHT: 66 IN | TEMPERATURE: 95.6 F | BODY MASS INDEX: 21.69 KG/M2 | HEART RATE: 96 BPM | SYSTOLIC BLOOD PRESSURE: 161 MMHG | DIASTOLIC BLOOD PRESSURE: 82 MMHG | WEIGHT: 135 LBS | RESPIRATION RATE: 18 BRPM

## 2020-02-20 DIAGNOSIS — E11.9 TYPE 2 DIABETES MELLITUS WITHOUT COMPLICATION, WITHOUT LONG-TERM CURRENT USE OF INSULIN (HCC): Primary | ICD-10-CM

## 2020-02-20 DIAGNOSIS — Z12.31 ENCOUNTER FOR SCREENING MAMMOGRAM FOR MALIGNANT NEOPLASM OF BREAST: ICD-10-CM

## 2020-02-20 DIAGNOSIS — I10 ESSENTIAL HYPERTENSION: ICD-10-CM

## 2020-02-20 DIAGNOSIS — Z23 ENCOUNTER FOR IMMUNIZATION: ICD-10-CM

## 2020-02-20 RX ORDER — LISINOPRIL 20 MG/1
TABLET ORAL
Qty: 30 TAB | Refills: 1 | Status: SHIPPED | OUTPATIENT
Start: 2020-02-20 | End: 2020-02-25

## 2020-02-20 NOTE — PROGRESS NOTES
Chief Complaint   Patient presents with    Diabetes     bp f/u     Pt has been out of Lisinopril x 1 wk. 1. Have you been to the ER, urgent care clinic since your last visit? Hospitalized since your last visit? No     2. Have you seen or consulted any other health care providers outside of the [address] [place] [address] since your last visit? Include any pap smears or colon screening.  No

## 2020-02-24 DIAGNOSIS — E11.9 TYPE 2 DIABETES MELLITUS WITHOUT COMPLICATION, WITHOUT LONG-TERM CURRENT USE OF INSULIN (HCC): ICD-10-CM

## 2020-02-24 DIAGNOSIS — I10 ESSENTIAL HYPERTENSION: ICD-10-CM

## 2020-02-25 RX ORDER — LISINOPRIL 20 MG/1
TABLET ORAL
Qty: 90 TAB | Refills: 0 | Status: SHIPPED | OUTPATIENT
Start: 2020-02-25 | End: 2020-06-16

## 2020-03-02 ENCOUNTER — HOSPITAL ENCOUNTER (OUTPATIENT)
Dept: LAB | Age: 60
Discharge: HOME | End: 2020-03-02

## 2020-03-02 ENCOUNTER — TELEPHONE (OUTPATIENT)
Dept: FAMILY MEDICINE CLINIC | Age: 60
End: 2020-03-02

## 2020-03-02 DIAGNOSIS — E11.9 TYPE 2 DIABETES MELLITUS WITHOUT COMPLICATION, WITHOUT LONG-TERM CURRENT USE OF INSULIN (HCC): ICD-10-CM

## 2020-03-02 DIAGNOSIS — E11.9 TYPE 2 DIABETES MELLITUS WITHOUT COMPLICATION, WITHOUT LONG-TERM CURRENT USE OF INSULIN (HCC): Primary | ICD-10-CM

## 2020-03-02 DIAGNOSIS — I10 ESSENTIAL HYPERTENSION: ICD-10-CM

## 2020-03-02 LAB
ANION GAP SERPL CALC-SCNC: 6 MMOL/L (ref 5–15)
BUN SERPL-MCNC: 13 MG/DL (ref 6–20)
BUN/CREAT SERPL: 18 (ref 12–20)
CALCIUM SERPL-MCNC: 9.1 MG/DL (ref 8.5–10.1)
CHLORIDE SERPL-SCNC: 109 MMOL/L (ref 97–108)
CO2 SERPL-SCNC: 26 MMOL/L (ref 21–32)
CREAT SERPL-MCNC: 0.73 MG/DL (ref 0.55–1.02)
EST. AVERAGE GLUCOSE BLD GHB EST-MCNC: 177 MG/DL
GLUCOSE SERPL-MCNC: 142 MG/DL (ref 65–100)
HBA1C MFR BLD: 7.8 % (ref 4–5.6)
POTASSIUM SERPL-SCNC: 3.7 MMOL/L (ref 3.5–5.1)
SODIUM SERPL-SCNC: 141 MMOL/L (ref 136–145)

## 2020-03-02 NOTE — TELEPHONE ENCOUNTER
Called and spoke with pt, and she has been advised and states understanding of results and recommendation. Pt agrees with plan. Pt will keep her 04/03/2020 appointment, and will bring blood sugar log book at that time.

## 2020-03-02 NOTE — TELEPHONE ENCOUNTER
Please call patient and her daughter and let them know her A1C is too high. I would like for her to stop the Januvia she is taking and start Jardiance instead. She needs to see me in 6 weeks and bring her blood sugars. A prescription has been sent to her pharmacy.

## 2020-03-24 RX ORDER — CLOPIDOGREL BISULFATE 75 MG/1
TABLET ORAL
Qty: 90 TAB | Refills: 4 | Status: SHIPPED | OUTPATIENT
Start: 2020-03-24 | End: 2021-06-11

## 2020-03-28 DIAGNOSIS — E11.9 TYPE 2 DIABETES MELLITUS WITHOUT COMPLICATION, WITHOUT LONG-TERM CURRENT USE OF INSULIN (HCC): ICD-10-CM

## 2020-03-29 RX ORDER — SITAGLIPTIN 100 MG/1
TABLET, FILM COATED ORAL
Qty: 90 TAB | Refills: 3 | Status: SHIPPED | OUTPATIENT
Start: 2020-03-29 | End: 2020-09-25

## 2020-04-20 DIAGNOSIS — G44.219 EPISODIC TENSION-TYPE HEADACHE, NOT INTRACTABLE: ICD-10-CM

## 2020-04-20 RX ORDER — PROPRANOLOL HYDROCHLORIDE 120 MG/1
CAPSULE, EXTENDED RELEASE ORAL
Qty: 90 CAP | Refills: 1 | OUTPATIENT
Start: 2020-04-20

## 2020-05-07 ENCOUNTER — VIRTUAL VISIT (OUTPATIENT)
Dept: FAMILY MEDICINE CLINIC | Age: 60
End: 2020-05-07

## 2020-05-07 VITALS — BODY MASS INDEX: 21.69 KG/M2 | WEIGHT: 135 LBS | HEIGHT: 66 IN

## 2020-05-07 DIAGNOSIS — K02.9 DENTAL CARIES: ICD-10-CM

## 2020-05-07 DIAGNOSIS — G81.10: Primary | ICD-10-CM

## 2020-05-07 DIAGNOSIS — I67.9: Primary | ICD-10-CM

## 2020-05-07 NOTE — PROGRESS NOTES
Chief Complaint   Patient presents with   [address] [place] [address] Chair     Referral Follow Up     Dental      Pt is having virtual appointment at home in [place].

## 2020-05-07 NOTE — PROGRESS NOTES
Silvino Gottlieb is a 61 y.o. female evaluated via telephone on 5/7/2020. Consent:  She and/or health care decision maker is aware that she may receive a bill for this telephone service, depending on her insurance coverage, and has provided verbal consent to proceed: Yes      Documentation:  I communicated with the patient and/or health care decision maker about obtaining a shower chair and dental referral.   Details of this discussion including any medical advice provided:       Subjective:   Silvino Gottlieb was seen for New Order (Shower Chair ) and Referral Follow Up (Dental )      Patient presents with:  New Order: Shower Chair   Referral Follow Up: Dental     She has had a CVA with deficits and needs a shower chair because she is having difficulty getting in and out of the tub, increasing her fall risk. Also, she has cavities in her front teeth and needs a referral to the dentist.  She does not have a dentist.              Review of Systems   Neurological: Positive for focal weakness and weakness. Objective:     Visit Vitals  Ht 5' 6\" (1.676 m)   Wt 135 lb (61.2 kg)   BMI 21.79 kg/m²         Assessment & Plan:   Diagnoses and all orders for this visit:    1. 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 Dental caries  -     REFERRAL TO DENTISTRY        Shower chair ordered  Referred to the [place] [address] clinic    Follow-up and Dispositions    · Return if symptoms worsen or fail to improve. Reviewed plan of care. Patient has provided input and agrees with goals. I affirm this is a Patient Initiated Episode with an Established Patient who has not had a related appointment within my department in the past 7 days or scheduled within the next 24 hours.     Total Time: minutes: 5-10 minutes    Note: not billable if this call serves to triage the patient into an appointment for the relevant concern      Charles Mckinney MD

## 2020-05-19 DIAGNOSIS — E11.9 TYPE 2 DIABETES MELLITUS WITHOUT COMPLICATION, WITHOUT LONG-TERM CURRENT USE OF INSULIN (HCC): ICD-10-CM

## 2020-05-22 RX ORDER — EMPAGLIFLOZIN 10 MG/1
TABLET, FILM COATED ORAL
Qty: 90 TAB | Refills: 4 | Status: SHIPPED | OUTPATIENT
Start: 2020-05-22 | End: 2020-12-15

## 2020-06-16 DIAGNOSIS — I10 ESSENTIAL HYPERTENSION: ICD-10-CM

## 2020-06-16 DIAGNOSIS — E11.9 TYPE 2 DIABETES MELLITUS WITHOUT COMPLICATION, WITHOUT LONG-TERM CURRENT USE OF INSULIN (HCC): ICD-10-CM

## 2020-06-16 RX ORDER — LISINOPRIL 20 MG/1
TABLET ORAL
Qty: 90 TAB | Refills: 0 | Status: SHIPPED | OUTPATIENT
Start: 2020-06-16 | End: 2020-08-20 | Stop reason: SDUPTHER

## 2020-06-16 NOTE — TELEPHONE ENCOUNTER
Called pt, and left a voice message, that he/she is due for their follow up appointment, here at [facility]. Advised pt to call the office back to schedule their appointment.

## 2020-06-16 NOTE — TELEPHONE ENCOUNTER
Pt called to check on status of refill requested for her blood pressure medication and wants to make sure it's sent to the pharmacy because she's out of it.  Kayla

## 2020-07-30 DIAGNOSIS — I10 ESSENTIAL HYPERTENSION: ICD-10-CM

## 2020-07-30 DIAGNOSIS — E11.9 TYPE 2 DIABETES MELLITUS WITHOUT COMPLICATION, WITHOUT LONG-TERM CURRENT USE OF INSULIN (HCC): ICD-10-CM

## 2020-07-30 NOTE — LETTER
7/31/2020 1:17 PM    Ms. Cabezas Post  [address] [address]      Dear Ms. Dawson:    We've missed you! Please call our office at [phone] and schedule a diabetic  follow up appointment for your continued care, we are also offering virtual appointments. Look forward to seeing you soon.          Sincerely,      Elliott Goodrich MD

## 2020-07-31 RX ORDER — LISINOPRIL 20 MG/1
TABLET ORAL
Qty: 90 TAB | Refills: 0 | OUTPATIENT
Start: 2020-07-31

## 2020-08-14 DIAGNOSIS — I10 ESSENTIAL HYPERTENSION: ICD-10-CM

## 2020-08-14 DIAGNOSIS — E11.9 TYPE 2 DIABETES MELLITUS WITHOUT COMPLICATION, WITHOUT LONG-TERM CURRENT USE OF INSULIN (HCC): ICD-10-CM

## 2020-08-17 NOTE — TELEPHONE ENCOUNTER
Pt called to check on status of blood pressure medication sent to Dr. Luciana Johnson on Friday.  Kayla

## 2020-08-18 RX ORDER — LISINOPRIL 20 MG/1
TABLET ORAL
Qty: 90 TAB | Refills: 0 | OUTPATIENT
Start: 2020-08-18

## 2020-08-20 DIAGNOSIS — I10 ESSENTIAL HYPERTENSION: ICD-10-CM

## 2020-08-20 DIAGNOSIS — E11.9 TYPE 2 DIABETES MELLITUS WITHOUT COMPLICATION, WITHOUT LONG-TERM CURRENT USE OF INSULIN (HCC): ICD-10-CM

## 2020-08-20 RX ORDER — LISINOPRIL 20 MG/1
20 TABLET ORAL DAILY
Qty: 30 TAB | Refills: 0 | Status: SHIPPED | OUTPATIENT
Start: 2020-08-20 | End: 2020-08-27 | Stop reason: SDUPTHER

## 2020-08-21 NOTE — TELEPHONE ENCOUNTER
Called pt, and left a voice message, that he/she is due for their follow up appointment, here at [facility]. Advised pt to call the office back to schedule their appointment.

## 2020-08-27 DIAGNOSIS — E11.9 TYPE 2 DIABETES MELLITUS WITHOUT COMPLICATION, WITHOUT LONG-TERM CURRENT USE OF INSULIN (HCC): ICD-10-CM

## 2020-08-27 DIAGNOSIS — I10 ESSENTIAL HYPERTENSION: ICD-10-CM

## 2020-08-27 RX ORDER — LISINOPRIL 20 MG/1
20 TABLET ORAL DAILY
Qty: 90 TAB | Refills: 0 | Status: SHIPPED | OUTPATIENT
Start: 2020-08-27 | End: 2020-10-02 | Stop reason: SDUPTHER

## 2020-08-27 NOTE — TELEPHONE ENCOUNTER
Pt called stating she's totally out of her blood pressure medication and needs refill sent to [facility] pharmacy.  Kayla

## 2020-08-28 NOTE — TELEPHONE ENCOUNTER
Called pt, and left a voice message, that he/she is due for their follow up appointment, here at [facility]. Advised pt to call the office back to schedule their appointment.

## 2020-09-25 ENCOUNTER — VIRTUAL VISIT (OUTPATIENT)
Dept: FAMILY MEDICINE CLINIC | Age: 60
End: 2020-09-25
Payer: MEDICAID

## 2020-09-25 DIAGNOSIS — E11.9 TYPE 2 DIABETES MELLITUS WITHOUT COMPLICATION, WITHOUT LONG-TERM CURRENT USE OF INSULIN (HCC): ICD-10-CM

## 2020-09-25 DIAGNOSIS — E11.9 TYPE 2 DIABETES MELLITUS WITHOUT COMPLICATION, WITHOUT LONG-TERM CURRENT USE OF INSULIN (HCC): Primary | ICD-10-CM

## 2020-09-25 DIAGNOSIS — I10 ESSENTIAL HYPERTENSION: ICD-10-CM

## 2020-09-25 PROCEDURE — 99442 PR PHYS/QHP TELEPHONE EVALUATION 11-20 MIN: CPT | Performed by: FAMILY MEDICINE

## 2020-09-25 RX ORDER — LISINOPRIL 20 MG/1
TABLET ORAL
Qty: 90 TAB | Refills: 0 | OUTPATIENT
Start: 2020-09-25

## 2020-09-25 NOTE — PROGRESS NOTES
Jocelynn Prieto is a 61 y.o. female who was seen by synchronous (real-time) audio-video technology on 9/25/2020. Assessment & Plan:  
{There are no diagnoses linked to this encounter. (Refresh or delete this SmartLink)} *** 
 
{Assessment and Plan:56755} Reviewed plan of care. Patient has provided input and agrees with goals. CPT Codes 45922-80791 for Established Patients may apply to this Telehealth Visit {Time-based coding, delete if not needed:42716::\"I spent at least 15 minutes with this established patient, and >50% of the time was spent counseling and/or coordinating care regarding ***\"} Subjective:  
Jocelynn Prieto was seen for Hypertension and Diabetes (Follow up) Jocelynn Prieto is here for diabetic follow up. Their FBS have been ***. This is a chronic problem which has *** changed. It is improved by ***, which is/are working ***. 
 
 
 
ROS Objective:  
 
Physical Exam 
 
Due to this being a TeleHealth evaluation, many elements of the physical examination are unable to be assessed. We discussed the expected course, resolution and complications of the diagnosis(es) in detail. Medication risks, benefits, costs, interactions, and alternatives were discussed as indicated. I advised her to contact the office if her condition worsens, changes or fails to improve as anticipated. She expressed understanding with the diagnosis(es) and plan. Pursuant to the emergency declaration under the [facility], [facility] waiver authority and the [employer] Act, this Virtual  Visit was conducted, with patient's consent, to reduce the patient's risk of exposure to COVID-19 and provide continuity of care for an established patient. Services were provided through a video synchronous discussion virtually to substitute for in-person clinic visit.  
 
Elliott Goodrich MD

## 2020-09-25 NOTE — PROGRESS NOTES
Mario Draper is a 61 y.o. female evaluated via telephone on 9/25/2020. Consent:  She and/or health care decision maker is aware that she may receive a bill for this telephone service, depending on her insurance coverage, and has provided verbal consent to proceed: Yes      Documentation:  I communicated with the patient and/or health care decision maker about her diabetes and BP. Details of this discussion including any medical advice provided:       Subjective:   Mario Draper was seen for Hypertension and Diabetes (Follow up)      Mario Draper is here for diabetic follow up. Their FBS have been - unknown. This is a chronic problem which has ? changed. It is improved by Genell Sanders, metformin, which is/are working ? Yaa Beth Review of Systems   Constitutional: Negative for weight loss. No weight gain   Eyes: Negative for blurred vision. Respiratory: Negative for shortness of breath. Cardiovascular: Negative for chest pain and leg swelling. Gastrointestinal: Negative for abdominal pain. Genitourinary:        No polyuria   Neurological: Positive for focal weakness. Negative for dizziness, sensory change, speech change and headaches. Endo/Heme/Allergies: Negative for polydipsia. Objective:   /70    Assessment & Plan:   Diagnoses and all orders for this visit:    1. Type 2 diabetes mellitus without complication, without long-term current use of insulin (HCC)  -     METABOLIC PANEL, BASIC; Future  -     HEMOGLOBIN A1C WITH EAG; Future    2. Essential hypertension  -     METABOLIC PANEL, BASIC; Future        Unknown diabetic control  Blood pressure controlled  Labs per orders. Continue current plans. Follow-up and Dispositions    · Return in about 4 months (around 1/25/2021) for diabetes. Reviewed plan of care. Patient has provided input and agrees with goals.       I affirm this is a Patient Initiated Episode with an Established Patient who has not had a related appointment within my department in the past 7 days or scheduled within the next 24 hours.     Total Time: minutes: 11-20 minutes    Note: not billable if this call serves to triage the patient into an appointment for the relevant concern      Ronni Zheng MD

## 2020-09-25 NOTE — PROGRESS NOTES
Chief Complaint   Patient presents with    Hypertension    Diabetes     Follow up   1. Have you been to the ER, urgent care clinic since your last visit? Hospitalized since your last visit? No    2. Have you seen or consulted any other health care providers outside of the [address] [place] [address] since your last visit? Include any pap smears or colon screening.  No

## 2020-10-02 RX ORDER — LISINOPRIL 20 MG/1
20 TABLET ORAL DAILY
Qty: 90 TAB | Refills: 3 | Status: SHIPPED | OUTPATIENT
Start: 2020-10-02 | End: 2021-09-01

## 2020-10-27 DIAGNOSIS — E78.00 HYPERCHOLESTEROLEMIA: ICD-10-CM

## 2020-10-29 DIAGNOSIS — E78.00 HYPERCHOLESTEROLEMIA: ICD-10-CM

## 2020-10-29 DIAGNOSIS — E11.9 TYPE 2 DIABETES MELLITUS WITHOUT COMPLICATION, WITHOUT LONG-TERM CURRENT USE OF INSULIN (HCC): Primary | ICD-10-CM

## 2020-10-29 DIAGNOSIS — I10 ESSENTIAL HYPERTENSION: ICD-10-CM

## 2020-10-29 DIAGNOSIS — E11.9 TYPE 2 DIABETES MELLITUS WITHOUT COMPLICATION, WITHOUT LONG-TERM CURRENT USE OF INSULIN (HCC): ICD-10-CM

## 2020-10-29 RX ORDER — ATORVASTATIN CALCIUM 10 MG/1
TABLET, FILM COATED ORAL
Qty: 90 TAB | Refills: 0 | Status: SHIPPED | OUTPATIENT
Start: 2020-10-29 | End: 2021-01-07

## 2020-11-04 ENCOUNTER — TELEPHONE (OUTPATIENT)
Dept: FAMILY MEDICINE CLINIC | Age: 60
End: 2020-11-04

## 2020-11-04 NOTE — TELEPHONE ENCOUNTER
Pt called stating she had the first of her shingles vaccines done in February of this year, but pharmacy is not administering her second one because she may need to start the series over. Please advise if this is correct or if pt can get second vaccine.  Kayla

## 2020-11-13 LAB
BUN SERPL-MCNC: 9 MG/DL (ref 8–27)
BUN/CREAT SERPL: 13 (ref 12–28)
CALCIUM SERPL-MCNC: 9.4 MG/DL (ref 8.7–10.3)
CHLORIDE SERPL-SCNC: 105 MMOL/L (ref 96–106)
CO2 SERPL-SCNC: 23 MMOL/L (ref 20–29)
CREAT SERPL-MCNC: 0.69 MG/DL (ref 0.57–1)
EST. AVERAGE GLUCOSE BLD GHB EST-MCNC: 206 MG/DL
GLUCOSE SERPL-MCNC: 117 MG/DL (ref 65–99)
HBA1C MFR BLD: 8.8 % (ref 4.8–5.6)
POTASSIUM SERPL-SCNC: 4 MMOL/L (ref 3.5–5.2)
SODIUM SERPL-SCNC: 143 MMOL/L (ref 134–144)

## 2020-12-15 ENCOUNTER — VIRTUAL VISIT (OUTPATIENT)
Dept: FAMILY MEDICINE CLINIC | Age: 60
End: 2020-12-15
Payer: MEDICAID

## 2020-12-15 DIAGNOSIS — E11.9 TYPE 2 DIABETES MELLITUS WITHOUT COMPLICATION, WITHOUT LONG-TERM CURRENT USE OF INSULIN (HCC): Primary | ICD-10-CM

## 2020-12-15 DIAGNOSIS — I10 ESSENTIAL HYPERTENSION: ICD-10-CM

## 2020-12-15 PROCEDURE — 99442 PR PHYS/QHP TELEPHONE EVALUATION 11-20 MIN: CPT | Performed by: FAMILY MEDICINE

## 2020-12-15 NOTE — PROGRESS NOTES
Chief Complaint   Patient presents with    Diabetes     Follow up    Hypertension    Labs     Review     1. Have you been to the ER, urgent care clinic since your last visit? Hospitalized since your last visit? No    2. Have you seen or consulted any other health care providers outside of the [address] [place] [address] since your last visit? Include any pap smears or colon screening. No    .

## 2020-12-15 NOTE — PROGRESS NOTES
Luca Sierra is a 61 y.o. female evaluated via telephone on 12/15/2020. Consent:  She and/or health care decision maker is aware that she may receive a bill for this telephone service, depending on her insurance coverage, and has provided verbal consent to proceed: Yes      Documentation:  I communicated with the patient and/or health care decision maker about her diabetes and HTN. Details of this discussion including any medical advice provided:       Subjective:   Luca Sierra was seen for Diabetes (Follow up); Hypertension; and Labs (Review)      Luca Sierra is here for diabetic follow up. Her recent A1C was 8.8. This is a chronic problem which has worsened. It is improved by Jardiance, metformin, which is/are working moderately. Review of Systems   Constitutional: Negative for weight loss. No weight gain   Eyes: Negative for blurred vision. Respiratory: Negative for shortness of breath. Cardiovascular: Negative for chest pain and leg swelling. Gastrointestinal: Negative for abdominal pain. Genitourinary:        No polyuria   Neurological: Negative for dizziness, sensory change, speech change and focal weakness. Endo/Heme/Allergies: Negative for polydipsia. Objective:   /61    BP Readings from Last 3 Encounters:   02/20/20 161/82   10/21/19 154/84   09/19/19 146/64         Assessment & Plan:   Diagnoses and all orders for this visit:    1. Type 2 diabetes mellitus without complication, without long-term current use of insulin (HCC)  -     empagliflozin (JARDIANCE) 25 mg tablet; TAKE 1 TABLET BY MOUTH EVERY DAY    2. Essential hypertension        Uncontrolled diabetes  Blood pressure controlled  Increase Jardiance    Follow-up and Dispositions    · Return in about 6 weeks (around 1/26/2021) for diabetes. Reviewed plan of care. Patient has provided input and agrees with goals.       I affirm this is a Patient Initiated Episode with an Established Patient who has not had a related appointment within my department in the past 7 days or scheduled within the next 24 hours.     Total Time: minutes: 11-20 minutes    Note: not billable if this call serves to triage the patient into an appointment for the relevant concern      Maryanne Hammans, MD

## 2021-01-06 DIAGNOSIS — E11.9 TYPE 2 DIABETES MELLITUS WITHOUT COMPLICATION, WITHOUT LONG-TERM CURRENT USE OF INSULIN (HCC): ICD-10-CM

## 2021-01-06 DIAGNOSIS — E78.00 HYPERCHOLESTEROLEMIA: ICD-10-CM

## 2021-01-07 RX ORDER — ATORVASTATIN CALCIUM 10 MG/1
TABLET, FILM COATED ORAL
Qty: 90 TAB | Refills: 0 | Status: SHIPPED | OUTPATIENT
Start: 2021-01-07 | End: 2021-05-27

## 2021-04-01 ENCOUNTER — TELEPHONE (OUTPATIENT)
Dept: FAMILY MEDICINE CLINIC | Age: 61
End: 2021-04-01

## 2021-04-01 NOTE — TELEPHONE ENCOUNTER
Pt called requesting assistance with scheduling Covid vaccine, has not registered with [employer], but has history of stroke and was unable to take the phone number to register. Pt can be reached at [phone] and prefers to have vaccine at local pharmacy. Advised pt  [employer] is not giving vaccines at pharmacy and to have family member assist with registering through [employer] by phone or website. Will mail information to pt.  Kayla

## 2021-04-05 DIAGNOSIS — E78.00 HYPERCHOLESTEROLEMIA: ICD-10-CM

## 2021-04-06 RX ORDER — ATORVASTATIN CALCIUM 10 MG/1
TABLET, FILM COATED ORAL
Qty: 90 TAB | Refills: 0 | OUTPATIENT
Start: 2021-04-06

## 2021-04-12 DIAGNOSIS — E78.00 HYPERCHOLESTEROLEMIA: ICD-10-CM

## 2021-04-12 DIAGNOSIS — E11.9 TYPE 2 DIABETES MELLITUS WITHOUT COMPLICATION, WITHOUT LONG-TERM CURRENT USE OF INSULIN (HCC): ICD-10-CM

## 2021-04-14 RX ORDER — CLOPIDOGREL BISULFATE 75 MG/1
TABLET ORAL
Qty: 90 TAB | Refills: 4 | OUTPATIENT
Start: 2021-04-14

## 2021-04-14 RX ORDER — ATORVASTATIN CALCIUM 10 MG/1
TABLET, FILM COATED ORAL
Qty: 90 TAB | Refills: 0 | OUTPATIENT
Start: 2021-04-14

## 2021-04-14 RX ORDER — SITAGLIPTIN 100 MG/1
TABLET, FILM COATED ORAL
Qty: 90 TAB | Refills: 3 | OUTPATIENT
Start: 2021-04-14

## 2021-04-14 NOTE — TELEPHONE ENCOUNTER
Called and spoke with pt, and she has been advised and states understanding of needing labs drawn and agrees with plan and will call office back to schedule.

## 2021-04-14 NOTE — TELEPHONE ENCOUNTER
Needs labs prior to a refill. She should have a lab slip. Also, please schedule her for a diabetic follow up.

## 2021-04-14 NOTE — TELEPHONE ENCOUNTER
----- Message from Tobias Osler sent at 4/14/2021 11:26 AM EDT -----  Regarding: Medication Refill  Contact: [phone]  Medication Refill    Caller (if not patient): NA      Relationship of caller (if not patient): Self       Best contact number(s): [phone]      Name of medication and dosage if known: clopidogrel bisulfate 75 mg         Is patient out of this medication (yes/no):Yes      Pharmacy name: [facility]    Pharmacy listed in chart? (yes/no): [facility]  Pharmacy phone number: NA      Details to clarify the request: NA Tobias Osler

## 2021-05-08 DIAGNOSIS — E78.00 HYPERCHOLESTEROLEMIA: ICD-10-CM

## 2021-05-08 RX ORDER — ATORVASTATIN CALCIUM 10 MG/1
TABLET, FILM COATED ORAL
Qty: 90 TAB | Refills: 0 | OUTPATIENT
Start: 2021-05-08

## 2021-05-08 NOTE — LETTER
5/10/2021 10:23 AM    Ms. Navin Drake  [address] [facility] [address]      Dear Ms. Dawson:    We've missed you! Please call our office at [phone] and schedule a follow up appointment for your continued care, we are offering virtual appointments as well. I will be unable to continue refilling your medication until you have been seen for an appointment. Look forward to seeing you soon.          Sincerely,      Vira Longoria MD

## 2021-05-27 ENCOUNTER — VIRTUAL VISIT (OUTPATIENT)
Dept: FAMILY MEDICINE CLINIC | Age: 61
End: 2021-05-27
Payer: MEDICARE

## 2021-05-27 ENCOUNTER — TELEPHONE (OUTPATIENT)
Dept: FAMILY MEDICINE CLINIC | Age: 61
End: 2021-05-27

## 2021-05-27 DIAGNOSIS — I10 ESSENTIAL HYPERTENSION: ICD-10-CM

## 2021-05-27 DIAGNOSIS — E11.9 TYPE 2 DIABETES MELLITUS WITHOUT COMPLICATION, WITHOUT LONG-TERM CURRENT USE OF INSULIN (HCC): Primary | ICD-10-CM

## 2021-05-27 DIAGNOSIS — E78.00 HYPERCHOLESTEROLEMIA: ICD-10-CM

## 2021-05-27 PROCEDURE — 2022F DILAT RTA XM EVC RTNOPTHY: CPT | Performed by: FAMILY MEDICINE

## 2021-05-27 PROCEDURE — 3046F HEMOGLOBIN A1C LEVEL >9.0%: CPT | Performed by: FAMILY MEDICINE

## 2021-05-27 PROCEDURE — 99214 OFFICE O/P EST MOD 30 MIN: CPT | Performed by: FAMILY MEDICINE

## 2021-05-27 PROCEDURE — G9899 SCRN MAM PERF RSLTS DOC: HCPCS | Performed by: FAMILY MEDICINE

## 2021-05-27 PROCEDURE — 3017F COLORECTAL CA SCREEN DOC REV: CPT | Performed by: FAMILY MEDICINE

## 2021-05-27 PROCEDURE — G8427 DOCREV CUR MEDS BY ELIG CLIN: HCPCS | Performed by: FAMILY MEDICINE

## 2021-05-27 PROCEDURE — G8756 NO BP MEASURE DOC: HCPCS | Performed by: FAMILY MEDICINE

## 2021-05-27 PROCEDURE — G8421 BMI NOT CALCULATED: HCPCS | Performed by: FAMILY MEDICINE

## 2021-05-27 PROCEDURE — G9717 DOC PT DX DEP/BP F/U NT REQ: HCPCS | Performed by: FAMILY MEDICINE

## 2021-05-27 RX ORDER — ATORVASTATIN CALCIUM 10 MG/1
TABLET, FILM COATED ORAL
Qty: 90 TABLET | Refills: 0 | Status: SHIPPED | OUTPATIENT
Start: 2021-05-27 | End: 2021-06-14

## 2021-05-27 NOTE — PROGRESS NOTES
Chief Complaint   Patient presents with    Medication Evaluation     1. Have you been to the ER, urgent care clinic since your last visit? Hospitalized since your last visit? No    2. Have you seen or consulted any other health care providers outside of the [address] [place] [address] since your last visit? Include any pap smears or colon screening.  No

## 2021-05-27 NOTE — PROGRESS NOTES
John Apodaca is a 64 y.o. female who was seen by synchronous (real-time) audio-video technology on 5/27/2021. Assessment & Plan:   Diagnoses and all orders for this visit:    1. Type 2 diabetes mellitus without complication, without long-term current use of insulin (HCC)  -     METABOLIC PANEL, BASIC; Future  -     HEMOGLOBIN A1C WITH EAG; Future  -     MICROALBUMIN, UR, RAND W/ MICROALB/CREAT RATIO; Future    2. Essential hypertension  -     METABOLIC PANEL, BASIC; Future    3. Hypercholesterolemia  -     LIPID PANEL; Future  -     HEPATIC FUNCTION PANEL; Future        Unknown diabetic control  Unknown BP today  Labs per orders. Continue current plans. Follow-up and Dispositions    · Return in about 4 months (around 9/27/2021) for diabetes. Reviewed plan of care. Patient has provided input and agrees with goals. CPT Codes 38776-52723 for Established Patients may apply to this Telehealth Visit      Subjective:   John Apodaca was seen for Medication Evaluation      John Apodaca is here for diabetic follow up. Their FBS have been around ? (her daughter has the records and she is not there). This is a chronic problem. Review of Systems   Eyes: Negative for blurred vision. Respiratory: Negative for shortness of breath. Cardiovascular: Negative for chest pain. Genitourinary:        No polyuria   Neurological: Negative for dizziness, sensory change, speech change, focal weakness and headaches. Endo/Heme/Allergies: Negative for polydipsia. Objective:     Physical Exam  Constitutional:       General: She is not in acute distress. Appearance: Normal appearance. Neurological:      Mental Status: She is alert and oriented to person, place, and time. Due to this being a TeleHealth evaluation, many elements of the physical examination are unable to be assessed.          We discussed the expected course, resolution and complications of the diagnosis(es) in detail. Medication risks, benefits, costs, interactions, and alternatives were discussed as indicated. I advised her to contact the office if her condition worsens, changes or fails to improve as anticipated. She expressed understanding with the diagnosis(es) and plan. Pursuant to the emergency declaration under the [address] [place] [address], [facility] waiver authority and the [employer] Act, this Virtual  Visit was conducted, with patient's consent, to reduce the patient's risk of exposure to COVID-19 and provide continuity of care for an established patient. Services were provided through a video synchronous discussion virtually to substitute for in-person clinic visit.     Diane Clark MD

## 2021-06-01 LAB
ALBUMIN SERPL-MCNC: 4.9 G/DL (ref 3.8–4.8)
ALBUMIN/CREAT UR: 15 MG/G CREAT (ref 0–29)
ALP SERPL-CCNC: 102 IU/L (ref 48–121)
ALT SERPL-CCNC: 11 IU/L (ref 0–32)
AST SERPL-CCNC: 16 IU/L (ref 0–40)
BILIRUB DIRECT SERPL-MCNC: 0.15 MG/DL (ref 0–0.4)
BILIRUB SERPL-MCNC: 0.6 MG/DL (ref 0–1.2)
BUN SERPL-MCNC: 11 MG/DL (ref 8–27)
BUN/CREAT SERPL: 14 (ref 12–28)
CALCIUM SERPL-MCNC: 9.8 MG/DL (ref 8.7–10.3)
CHLORIDE SERPL-SCNC: 103 MMOL/L (ref 96–106)
CHOLEST SERPL-MCNC: 165 MG/DL (ref 100–199)
CO2 SERPL-SCNC: 22 MMOL/L (ref 20–29)
CREAT SERPL-MCNC: 0.78 MG/DL (ref 0.57–1)
CREAT UR-MCNC: 92.9 MG/DL
EST. AVERAGE GLUCOSE BLD GHB EST-MCNC: 203 MG/DL
GLUCOSE SERPL-MCNC: 122 MG/DL (ref 65–99)
HBA1C MFR BLD: 8.7 % (ref 4.8–5.6)
HDLC SERPL-MCNC: 51 MG/DL
IMP & REVIEW OF LAB RESULTS: NORMAL
LDLC SERPL CALC-MCNC: 96 MG/DL (ref 0–99)
MICROALBUMIN UR-MCNC: 13.7 UG/ML
POTASSIUM SERPL-SCNC: 3.7 MMOL/L (ref 3.5–5.2)
PROT SERPL-MCNC: 8.2 G/DL (ref 6–8.5)
SODIUM SERPL-SCNC: 142 MMOL/L (ref 134–144)
TRIGL SERPL-MCNC: 95 MG/DL (ref 0–149)
VLDLC SERPL CALC-MCNC: 18 MG/DL (ref 5–40)

## 2021-06-02 ENCOUNTER — TELEPHONE (OUTPATIENT)
Dept: PHARMACY | Age: 61
End: 2021-06-02

## 2021-06-02 NOTE — TELEPHONE ENCOUNTER
CLINICAL PHARMACY: ADHERENCE REVIEW  Identified care gap per [place]; fills at [facility]: ACE/ARB, Diabetes and Statin adherence    Last Visit: 5/27/21    Patient identified as LIS = 3, therefore patient's co-pays are $0.00 through all phases of the benefit regardless of the days' supply dispensed    ASSESSMENT  ACE/ARB ADHERENCE    Per Insurance Records through 5/23/21 (2020 [place] = n/a%; YTD [place] = 100%; Potential Fail Date: 10/18/21):   Lisinopril 20 mg tablets last filled on 4/26/21 for 90 day supply. Next refill due: 8/16/21  Per chart review, 1 year supply of refills sent to pharmacy 10/2/2020    BP Readings from Last 3 Encounters:   02/20/20 161/82   10/21/19 154/84   09/19/19 146/64     CrCl cannot be calculated (Unknown ideal weight.). DIABETES ADHERENCE    Per Insurance Records through 5/23/21 ([address] [place] = n/a%; YTD [place] = filled only once):   Jardiance last filled on 5/8/21 for 90 day supply. Next refill due: 8/6/21  1 year supply of refills sent to pharmacy 1/7/21    Lab Results   Component Value Date/Time    Hemoglobin A1c 8.7 (H) 05/29/2021 10:06 AM    Hemoglobin A1c 8.8 (H) 11/12/2020 08:49 AM    Hemoglobin A1c 7.8 (H) 03/02/2020 11:32 AM     NOTE A1c <9%    STATIN ADHERENCE    Per Insurance Records through 5/23/21 (2020 [place] = n/a%; YTD [place] = filled only once): Atorvastatin 10 mg tablets last filled on 1/7/21 for 90 day supply. Next refill due: 4/7/21    Per [facility]:   Atorvastatin 10 mg tablets filled and ready and waiting to be picked up since 5/27/21 for 90 day supply. 0 refills remaining. Billed through [employer], $0. Will be held for 14 days from fill date.     Lab Results   Component Value Date/Time    Cholesterol, total 165 05/29/2021 10:06 AM    HDL Cholesterol 51 05/29/2021 10:06 AM    LDL, calculated 96 05/29/2021 10:06 AM    LDL, calculated 37 04/14/2017 09:32 AM    VLDL, calculated 18 05/29/2021 10:06 AM    VLDL, calculated 17 04/14/2017 09:32 AM    Triglyceride 95 05/29/2021 10:06 AM     ALT (SGPT)   Date Value Ref Range Status   05/29/2021 11 0 - 32 IU/L Final     AST (SGOT)   Date Value Ref Range Status   05/29/2021 16 0 - 40 IU/L Final     The ASCVD Risk score (Lavonne Lees, et al., 2013) failed to calculate for the following reasons: The systolic blood pressure is missing     PLAN  The following are interventions that have been identified:  - Patient overdue refilling atorvastatin and active on home medication list. Ready at pharmacy, awaiting  $0. Attempting to reach patient to review.  Left message asking for return call. No future appointments.     Codie Aguilar, PharmD, [address]  Direct: [phone]  Department, toll free: [phone], option 7

## 2021-06-11 DIAGNOSIS — E11.9 TYPE 2 DIABETES MELLITUS WITHOUT COMPLICATION, WITHOUT LONG-TERM CURRENT USE OF INSULIN (HCC): ICD-10-CM

## 2021-06-11 RX ORDER — CLOPIDOGREL BISULFATE 75 MG/1
TABLET ORAL
Qty: 90 TABLET | Refills: 4 | Status: SHIPPED | OUTPATIENT
Start: 2021-06-11 | End: 2022-07-04

## 2021-06-11 RX ORDER — EMPAGLIFLOZIN 10 MG/1
TABLET, FILM COATED ORAL
Qty: 90 TABLET | Refills: 0 | OUTPATIENT
Start: 2021-06-11

## 2021-06-13 DIAGNOSIS — E11.9 TYPE 2 DIABETES MELLITUS WITHOUT COMPLICATION, WITHOUT LONG-TERM CURRENT USE OF INSULIN (HCC): ICD-10-CM

## 2021-06-14 DIAGNOSIS — E78.00 HYPERCHOLESTEROLEMIA: ICD-10-CM

## 2021-06-14 RX ORDER — ATORVASTATIN CALCIUM 10 MG/1
TABLET, FILM COATED ORAL
Qty: 90 TABLET | Refills: 3 | Status: SHIPPED | OUTPATIENT
Start: 2021-06-14 | End: 2021-06-17

## 2021-06-14 RX ORDER — SITAGLIPTIN 100 MG/1
TABLET, FILM COATED ORAL
Qty: 90 TABLET | Refills: 3 | Status: SHIPPED | OUTPATIENT
Start: 2021-06-14 | End: 2021-06-17 | Stop reason: ALTCHOICE

## 2021-06-17 ENCOUNTER — VIRTUAL VISIT (OUTPATIENT)
Dept: FAMILY MEDICINE CLINIC | Age: 61
End: 2021-06-17
Payer: MEDICARE

## 2021-06-17 DIAGNOSIS — I10 ESSENTIAL HYPERTENSION: ICD-10-CM

## 2021-06-17 DIAGNOSIS — E11.9 TYPE 2 DIABETES MELLITUS WITHOUT COMPLICATION, WITHOUT LONG-TERM CURRENT USE OF INSULIN (HCC): Primary | ICD-10-CM

## 2021-06-17 DIAGNOSIS — E78.00 HYPERCHOLESTEROLEMIA: ICD-10-CM

## 2021-06-17 DIAGNOSIS — F01.50 VASCULAR DEMENTIA WITHOUT BEHAVIORAL DISTURBANCE (HCC): ICD-10-CM

## 2021-06-17 PROCEDURE — 99443 PR PHYS/QHP TELEPHONE EVALUATION 21-30 MIN: CPT | Performed by: FAMILY MEDICINE

## 2021-06-17 RX ORDER — DULAGLUTIDE 0.75 MG/.5ML
0.75 INJECTION, SOLUTION SUBCUTANEOUS
Qty: 4 PEN | Refills: 2 | Status: SHIPPED | OUTPATIENT
Start: 2021-06-17 | End: 2021-07-19 | Stop reason: SDUPTHER

## 2021-06-17 RX ORDER — INSULIN PUMP SYRINGE, 3 ML
EACH MISCELLANEOUS
Qty: 1 KIT | Refills: 0 | Status: SHIPPED | OUTPATIENT
Start: 2021-06-17

## 2021-06-17 RX ORDER — ATORVASTATIN CALCIUM 20 MG/1
20 TABLET, FILM COATED ORAL
Qty: 90 TABLET | Refills: 0 | Status: SHIPPED | OUTPATIENT
Start: 2021-06-17 | End: 2021-07-20

## 2021-06-17 RX ORDER — IBUPROFEN 200 MG
CAPSULE ORAL
Qty: 100 STRIP | Refills: 4 | Status: SHIPPED | OUTPATIENT
Start: 2021-06-17

## 2021-06-17 RX ORDER — PEN NEEDLE, DIABETIC 30 GX3/16"
NEEDLE, DISPOSABLE MISCELLANEOUS
Qty: 100 PACKAGE | Refills: 4 | Status: SHIPPED | OUTPATIENT
Start: 2021-06-17

## 2021-06-17 NOTE — PROGRESS NOTES
Gunnar Perez is a 64 y.o. female evaluated via telephone on 6/17/2021. Consent:  She and/or health care decision maker is aware that she may receive a bill for this telephone service, depending on her insurance coverage, and has provided verbal consent to proceed: Yes      Documentation:  I communicated with the patient and/or health care decision maker about her diabetes and cholesterol. Details of this discussion including any medical advice provided:       Subjective:   Gunnar Perez was seen for Results (discuss lab results )      Patient presents with:  Results: discuss lab results     Her recent labs were significant for an A1C of 8.7 and an LDL of 96. She is diabetic and is checking her blood sugars, but their glucometer is not working. Compliant with meds. Her daughter is with her and helps with the history due to the patient's dementia. Her daughter requests an order for a BP monitor. Review of Systems   Constitutional: Negative for weight loss. No weight gain   Eyes: Negative for blurred vision. Neurological: Negative for dizziness and headaches. Objective:     BP Readings from Last 3 Encounters:   02/20/20 161/82   10/21/19 154/84   09/19/19 146/64           Assessment & Plan:   Diagnoses and all orders for this visit:    1. Type 2 diabetes mellitus without complication, without long-term current use of insulin (HCC)  -     dulaglutide (Trulicity) 0.85 OF/3.3 mL sub-q pen; 0.5 mL by SubCUTAneous route every seven (7) days. 2. Hypercholesterolemia  -     atorvastatin (LIPITOR) 20 mg tablet; Take 1 Tablet by mouth nightly. -     LIPID PANEL; Future  -     HEPATIC FUNCTION PANEL; Future    3. Vascular dementia without behavioral disturbance ([facility] [address]    4. Essential hypertension  -     AMB SUPPLY ORDER    Other orders  -     glucose blood VI test strips (blood glucose test) strip;  Test daily or as directed; please use brand covered by insurance  -     Insulin Needles, Disposable, 31 gauge x 5/16\" ndle; Use once daily as directed  -     Blood-Glucose Meter monitoring kit; Test daily or as directed; please use brand covered by insurance        Uncontrolled diabetes, LDL not at goal  Add Trulicity  Stop Dmitriuvia  Increase Lipitor with lipid labs in 3 months  BP machine ordered  Glucometer with supplies ordered    Follow-up and Dispositions    · Return in about 2 months (around 8/17/2021) for diabetes. Reviewed plan of care. Patient and her daughter have provided input and agree with goals. I affirm this is a Patient Initiated Episode with an Established Patient who has not had a related appointment within my department in the past 7 days or scheduled within the next 24 hours.     Total Time: minutes: 21-30 minutes    Note: not billable if this call serves to triage the patient into an appointment for the relevant concern      Vira Longorai MD

## 2021-07-06 NOTE — TELEPHONE ENCOUNTER
Per [facility], patient picked up atorvastatin 10 mg tablets 6/11/21, then updated atorvastatin 20 mg tablets filled 6/17/21 on 6/21/21. No further outreach planned at this time.     For Pharmacy Admin Tracking Only     CPA in place: No   Gap Closed?: Yes   Time Spent (min): 10

## 2021-07-19 DIAGNOSIS — E78.00 HYPERCHOLESTEROLEMIA: ICD-10-CM

## 2021-07-19 DIAGNOSIS — E11.9 TYPE 2 DIABETES MELLITUS WITHOUT COMPLICATION, WITHOUT LONG-TERM CURRENT USE OF INSULIN (HCC): ICD-10-CM

## 2021-07-19 NOTE — TELEPHONE ENCOUNTER
Pt requesting refill on Trulicity to be sent to [facility]. Per chart, refill sent 6/17/21 with 2 additional refills, but pt states she was advised to contact Dr. Evaristo Welelr for refill.  Mcm

## 2021-07-20 RX ORDER — DULAGLUTIDE 0.75 MG/.5ML
0.75 INJECTION, SOLUTION SUBCUTANEOUS
Qty: 4 PEN | Refills: 3 | Status: SHIPPED | OUTPATIENT
Start: 2021-07-20 | End: 2021-09-29 | Stop reason: SDUPTHER

## 2021-07-20 RX ORDER — ATORVASTATIN CALCIUM 20 MG/1
TABLET, FILM COATED ORAL
Qty: 90 TABLET | Refills: 0 | Status: SHIPPED | OUTPATIENT
Start: 2021-07-20 | End: 2022-01-02

## 2021-07-20 RX ORDER — EMPAGLIFLOZIN 10 MG/1
TABLET, FILM COATED ORAL
Qty: 90 TABLET | Refills: 3 | OUTPATIENT
Start: 2021-07-20

## 2021-07-21 ENCOUNTER — TELEPHONE (OUTPATIENT)
Dept: FAMILY MEDICINE CLINIC | Age: 61
End: 2021-07-21

## 2021-07-21 NOTE — TELEPHONE ENCOUNTER
Pt called to advise Dr. Adonis Jasso she had both of her shingles vaccines done at her local [facility] last year and wants to know if she has to get the vaccine again. No record found in pt's chart and pt agrees to have pharmacy fax vaccine record to Dr. Adonis Jasso, but wants call from nurse to confirm.  Kayla

## 2021-07-22 NOTE — TELEPHONE ENCOUNTER
Called twice mail box if full sent e-mail to on file [email or web address] advised to have imms faxed.

## 2021-09-01 DIAGNOSIS — I10 ESSENTIAL HYPERTENSION: ICD-10-CM

## 2021-09-01 DIAGNOSIS — E11.9 TYPE 2 DIABETES MELLITUS WITHOUT COMPLICATION, WITHOUT LONG-TERM CURRENT USE OF INSULIN (HCC): ICD-10-CM

## 2021-09-01 RX ORDER — LISINOPRIL 20 MG/1
TABLET ORAL
Qty: 30 TABLET | Refills: 0 | Status: SHIPPED | OUTPATIENT
Start: 2021-09-01 | End: 2021-11-03

## 2021-09-09 ENCOUNTER — TELEPHONE (OUTPATIENT)
Dept: FAMILY MEDICINE CLINIC | Age: 61
End: 2021-09-09

## 2021-09-09 NOTE — TELEPHONE ENCOUNTER
Called pt, however, pt states she is doing well and does not need to follow up with Dr. Adolfo Shaw.

## 2021-09-09 NOTE — TELEPHONE ENCOUNTER
----- Message from Mariann Dunn sent at 9/9/2021  1:27 PM EDT -----  Regarding: Dr. Frankel Grandchild: [phone]  General Message/Vendor Calls    Caller's first and last name: Pt.       Reason for call: Needs to schedule [facility] F/ up Kip Jenkins admitted last week for Covid, discharged unknown date. Callback required yes/no and why: Yes, needs to schedule [facility]. Best contact number(s): [phone]      Details to clarify the request: N/a.       Mariann Dunn

## 2021-09-17 ENCOUNTER — VIRTUAL VISIT (OUTPATIENT)
Dept: FAMILY MEDICINE CLINIC | Age: 61
End: 2021-09-17
Payer: MEDICARE

## 2021-09-17 DIAGNOSIS — Z09 HOSPITAL DISCHARGE FOLLOW-UP: ICD-10-CM

## 2021-09-17 DIAGNOSIS — U07.1 COVID-19: Primary | ICD-10-CM

## 2021-09-17 PROCEDURE — 99443 PR PHYS/QHP TELEPHONE EVALUATION 21-30 MIN: CPT | Performed by: FAMILY MEDICINE

## 2021-09-17 PROCEDURE — 1111F DSCHRG MED/CURRENT MED MERGE: CPT | Performed by: FAMILY MEDICINE

## 2021-09-17 RX ORDER — GLIPIZIDE 5 MG/1
TABLET ORAL
COMMUNITY
Start: 2021-08-22 | End: 2021-09-29 | Stop reason: SDUPTHER

## 2021-09-17 RX ORDER — MELATONIN
1000 DAILY
COMMUNITY

## 2021-09-17 RX ORDER — DEXAMETHASONE 4 MG/1
TABLET ORAL
COMMUNITY
Start: 2021-08-22 | End: 2021-11-23

## 2021-09-17 RX ORDER — ASCORBIC ACID 500 MG
500 TABLET ORAL 2 TIMES DAILY
COMMUNITY
Start: 2021-08-24

## 2021-09-17 RX ORDER — AMLODIPINE BESYLATE 5 MG/1
5 TABLET ORAL DAILY
COMMUNITY
Start: 2021-08-22 | End: 2021-11-23

## 2021-09-17 NOTE — PROGRESS NOTES
Chief Complaint   Patient presents with   [facility] Follow Up     1. Have you been to the ER, urgent care clinic since your last visit? Hospitalized since your last visit? Yes, [facility], [id number] complications. 2. Have you seen or consulted any other health care providers outside of the [facility] since your last visit? Include any pap smears or colon screening.  No

## 2021-09-17 NOTE — PROGRESS NOTES
Aleah Moreno is a 64 y.o. female evaluated via telephone on 9/17/2021. Consent:  She and/or health care decision maker is aware that she may receive a bill for this telephone service, depending on her insurance coverage, and has provided verbal consent to proceed: Yes      Documentation:  I communicated with the patient and/or health care decision maker about her hospitalization for COVID-19. Details of this discussion including any medical advice provided:       Subjective:   Aleah Moreno was seen for Hospital Follow Up      Aleah Moreno is here for hospital follow up after being admitted 8/16/21 to 8/23/21 for ECSYS-82 complications. Discharge summary unavailable. Her daughter is with her. She needs FMLA in order to care for her mother prn. Currently, she is feeling much better and is almost back to normal.            Review of Systems   Constitutional: Negative for chills, fever and malaise/fatigue. HENT: Negative for congestion and sore throat. No loss of taste or smell   Respiratory: Negative for cough, shortness of breath and wheezing. Cardiovascular: Negative for chest pain. Gastrointestinal: Negative for abdominal pain, diarrhea, nausea and vomiting. Musculoskeletal: Negative for myalgias. Neurological: Negative for headaches. Objective:     BP Readings from Last 3 Encounters:   02/20/20 161/82   10/21/19 154/84   09/19/19 146/64           Assessment & Plan:   Diagnoses and all orders for this visit:    1. COVID-19        Doing well  Daughter will schedule an appointment for her FMLA    Follow-up and Dispositions    · Return in about 2 weeks (around 10/1/2021) for diabetes. Reviewed plan of care. Patient has provided input and agrees with goals. I affirm this is a Patient Initiated Episode with an Established Patient who has not had a related appointment within my department in the past 7 days or scheduled within the next 24 hours.     Total Time: minutes: 21-30 minutes    Note: not billable if this call serves to triage the patient into an appointment for the relevant concern      Tyler You MD

## 2021-09-28 PROBLEM — F32.1 MODERATE SINGLE CURRENT EPISODE OF MAJOR DEPRESSIVE DISORDER (HCC): Status: RESOLVED | Noted: 2017-12-11 | Resolved: 2021-09-28

## 2021-09-29 ENCOUNTER — OFFICE VISIT (OUTPATIENT)
Dept: FAMILY MEDICINE CLINIC | Age: 61
End: 2021-09-29

## 2021-09-29 VITALS
HEART RATE: 100 BPM | BODY MASS INDEX: 20.41 KG/M2 | SYSTOLIC BLOOD PRESSURE: 133 MMHG | DIASTOLIC BLOOD PRESSURE: 71 MMHG | OXYGEN SATURATION: 96 % | HEIGHT: 66 IN | WEIGHT: 127 LBS | TEMPERATURE: 97.7 F

## 2021-09-29 DIAGNOSIS — E78.00 HYPERCHOLESTEROLEMIA: ICD-10-CM

## 2021-09-29 DIAGNOSIS — I67.9: ICD-10-CM

## 2021-09-29 DIAGNOSIS — F01.50 VASCULAR DEMENTIA WITHOUT BEHAVIORAL DISTURBANCE (HCC): ICD-10-CM

## 2021-09-29 DIAGNOSIS — E11.9 TYPE 2 DIABETES MELLITUS WITHOUT COMPLICATION, WITHOUT LONG-TERM CURRENT USE OF INSULIN (HCC): Primary | ICD-10-CM

## 2021-09-29 DIAGNOSIS — G81.10: ICD-10-CM

## 2021-09-29 DIAGNOSIS — Z00.00 MEDICARE ANNUAL WELLNESS VISIT, SUBSEQUENT: ICD-10-CM

## 2021-09-29 DIAGNOSIS — I10 ESSENTIAL HYPERTENSION: ICD-10-CM

## 2021-09-29 DIAGNOSIS — E11.65 TYPE 2 DIABETES MELLITUS WITH HYPERGLYCEMIA, WITHOUT LONG-TERM CURRENT USE OF INSULIN (HCC): ICD-10-CM

## 2021-09-29 PROCEDURE — G8510 SCR DEP NEG, NO PLAN REQD: HCPCS | Performed by: FAMILY MEDICINE

## 2021-09-29 PROCEDURE — G0439 PPPS, SUBSEQ VISIT: HCPCS | Performed by: FAMILY MEDICINE

## 2021-09-29 PROCEDURE — 3052F HG A1C>EQUAL 8.0%<EQUAL 9.0%: CPT | Performed by: FAMILY MEDICINE

## 2021-09-29 PROCEDURE — 99214 OFFICE O/P EST MOD 30 MIN: CPT | Performed by: FAMILY MEDICINE

## 2021-09-29 PROCEDURE — G8754 DIAS BP LESS 90: HCPCS | Performed by: FAMILY MEDICINE

## 2021-09-29 PROCEDURE — G8752 SYS BP LESS 140: HCPCS | Performed by: FAMILY MEDICINE

## 2021-09-29 PROCEDURE — G8427 DOCREV CUR MEDS BY ELIG CLIN: HCPCS | Performed by: FAMILY MEDICINE

## 2021-09-29 PROCEDURE — 3017F COLORECTAL CA SCREEN DOC REV: CPT | Performed by: FAMILY MEDICINE

## 2021-09-29 PROCEDURE — G8420 CALC BMI NORM PARAMETERS: HCPCS | Performed by: FAMILY MEDICINE

## 2021-09-29 PROCEDURE — G9899 SCRN MAM PERF RSLTS DOC: HCPCS | Performed by: FAMILY MEDICINE

## 2021-09-29 PROCEDURE — 2022F DILAT RTA XM EVC RTNOPTHY: CPT | Performed by: FAMILY MEDICINE

## 2021-09-29 RX ORDER — DULAGLUTIDE 0.75 MG/.5ML
0.75 INJECTION, SOLUTION SUBCUTANEOUS
Qty: 4 PEN | Refills: 1 | Status: SHIPPED | OUTPATIENT
Start: 2021-09-29 | End: 2021-11-23 | Stop reason: DRUGHIGH

## 2021-09-29 RX ORDER — GLIPIZIDE 5 MG/1
TABLET ORAL
Qty: 90 TABLET | Refills: 1 | Status: SHIPPED | OUTPATIENT
Start: 2021-09-29 | End: 2021-11-14

## 2021-09-29 NOTE — PROGRESS NOTES
Identified pt with two pt identifiers(name and ). Reviewed record in preparation for visit and have obtained necessary documentation. Chief Complaint   Patient presents with    Annual Wellness Visit        Vitals:    21 1313   BP: 133/71   Pulse: 100   Temp: 97.7 °F (36.5 °C)   TempSrc: Temporal   SpO2: 96%   Weight: 127 lb (57.6 kg)   Height: 5' 6\" (1.676 m)   PainSc:   0 - No pain       Health Maintenance Due   Topic    COVID-19 Vaccine (1)    Shingrix Vaccine Age 50> (1 of 2)    Eye Exam Retinal or Dilated     Breast Cancer Screen Mammogram     Medicare Yearly Exam     Foot Exam Q1     Cervical cancer screen        Coordination of Care Questionnaire:  :   1) Have you been to an emergency room, urgent care, or hospitalized since your last visit? If yes, where when, and reason for visit? Yes seen at [facility] urgent care and was admitted to [facility]. 2. Have seen or consulted any other health care provider since your last visit? If yes, where when, and reason for visit? No      Patient is accompanied by DAUGHTER I have received verbal consent from [facility] to discuss any/all medical information while they are present in the room.

## 2021-09-29 NOTE — PATIENT INSTRUCTIONS
Medicare Wellness Visit, Female     The best way to live healthy is to have a lifestyle where you eat a well-balanced diet, exercise regularly, limit alcohol use, and quit all forms of tobacco/nicotine, if applicable. Regular preventive services are another way to keep healthy. Preventive services (vaccines, screening tests, monitoring & exams) can help personalize your care plan, which helps you manage your own care. Screening tests can find health problems at the earliest stages, when they are easiest to treat. [facility] follows the current, evidence-based guidelines published by the [facility] ([facility]) when recommending preventive services for our patients. Because we follow these guidelines, sometimes recommendations change over time as research supports it. (For example, mammograms used to be recommended annually. Even though Medicare will still pay for an annual mammogram, the newer guidelines recommend a mammogram every two years for women of average risk). Of course, you and your doctor may decide to screen more often for some diseases, based on your risk and your co-morbidities (chronic disease you are already diagnosed with). Preventive services for you include:  - Medicare offers their members a free annual wellness visit, which is time for you and your primary care provider to discuss and plan for your preventive service needs. Take advantage of this benefit every year!  -All adults over the age of 72 should receive the recommended pneumonia vaccines. Current USPSTF guidelines recommend a series of two vaccines for the best pneumonia protection.   -All adults should have a flu vaccine yearly and a tetanus vaccine every 10 years.   -All adults age 48 and older should receive the shingles vaccines (series of two vaccines).       -All adults age 38-68 who are overweight should have a diabetes screening test once every three years.   -All adults born between [birth year] and 1965 should be screened once for Hepatitis C.  -Other screening tests and preventive services for persons with diabetes include: an eye exam to screen for diabetic retinopathy, a kidney function test, a foot exam, and stricter control over your cholesterol.   -Cardiovascular screening for adults with routine risk involves an electrocardiogram (ECG) at intervals determined by your doctor.   -Colorectal cancer screenings should be done for adults age 54-65 with no increased risk factors for colorectal cancer. There are a number of acceptable methods of screening for this type of cancer. Each test has its own benefits and drawbacks. Discuss with your doctor what is most appropriate for you during your annual wellness visit. The different tests include: colonoscopy (considered the best screening method), a fecal occult blood test, a fecal DNA test, and sigmoidoscopy.    -A bone mass density test is recommended when a woman turns 65 to screen for osteoporosis. This test is only recommended one time, as a screening. Some providers will use this same test as a disease monitoring tool if you already have osteoporosis. -Breast cancer screenings are recommended every other year for women of normal risk, age 54-69.  -Cervical cancer screenings for women over age 72 are only recommended with certain risk factors.      Here is a list of your current Health Maintenance items (your personalized list of preventive services) with a due date:  Health Maintenance Due   Topic Date Due    COVID-19 Vaccine (1) Never done    Shingles Vaccine (1 of 2) Never done    Eye Exam  02/14/2019    Mammogram  05/23/2019    Diabetic Foot Care  02/20/2021    Cervical cancer screen  05/13/2021

## 2021-09-29 NOTE — PROGRESS NOTES
HISTORY OF PRESENT ILLNESS  Ronald Pappas is a 64 y.o. female. Ronald Pappas is here for diabetic follow up. Her daughter is with her. Review of Systems   Eyes: Negative for blurred vision. Respiratory: Negative for shortness of breath. Cardiovascular: Negative for chest pain. Genitourinary:        No polyuria   Neurological: Negative for dizziness, sensory change, speech change, focal weakness and headaches. Endo/Heme/Allergies: Negative for polydipsia. Visit Vitals  /71 (BP 1 Location: Right upper arm, BP Patient Position: Sitting, BP Cuff Size: Adult)   Pulse 100   Temp 97.7 °F (36.5 °C) (Temporal)   Ht 5' 6\" (1.676 m)   Wt 127 lb (57.6 kg)   SpO2 96%   BMI 20.50 kg/m²     Physical Exam  Vitals and nursing note reviewed. Constitutional:       General: She is not in acute distress. Appearance: She is well-developed. She is not diaphoretic. Cardiovascular:      Rate and Rhythm: Normal rate and regular rhythm. Heart sounds: Normal heart sounds. No murmur heard. No friction rub. No gallop. Pulmonary:      Effort: Pulmonary effort is normal. No respiratory distress. Breath sounds: Normal breath sounds. No wheezing or rales. Skin:     General: Skin is warm and dry. Comments: Feet and nails in good condition. Some onychomycosis. Neurological:      Mental Status: She is alert and oriented to person, place, and time. Comments: Normal monofilament exam       Lab Results   Component Value Date/Time    Hemoglobin A1c 8.7 (H) 05/29/2021 10:06 AM           ASSESSMENT and PLAN    ICD-10-CM ICD-9-CM    1. Type 2 diabetes mellitus without complication, without long-term current use of insulin ([facility])  Z47.0 342.26 METABOLIC PANEL, BASIC      HEMOGLOBIN A1C WITH EAG       DIABETES FOOT EXAM      METABOLIC PANEL, BASIC      HEMOGLOBIN A1C WITH EAG   2.  Type 2 diabetes mellitus with hyperglycemia, without long-term current use of insulin ([facility])  B69.69 738.05 METABOLIC PANEL, BASIC     790.29 HEMOGLOBIN A1C WITH EAG      METABOLIC PANEL, BASIC      HEMOGLOBIN A1C WITH EAG   3. Essential hypertension  C82 821.2 METABOLIC PANEL, BASIC      METABOLIC PANEL, BASIC   4. Hypercholesterolemia  E78.00 272.0 HEPATIC FUNCTION PANEL      NMR LIPOPROFILE WITH LIPIDS (WITHOUT GRAPH)      HEPATIC FUNCTION PANEL      NMR LIPOPROFILE WITH LIPIDS (WITHOUT GRAPH)   5. Spastic hemiplegia due to cerebrovascular disease (HCC)  I67.9 437.9 NMR LIPOPROFILE WITH LIPIDS (WITHOUT GRAPH)    G81.10 342.10 NMR LIPOPROFILE WITH LIPIDS (WITHOUT GRAPH)   6. Medicare annual wellness visit, subsequent  Z00.00 V70.0    7. Vascular dementia without behavioral disturbance (HCC)  F01.50 290.40         Diabetes not controlled in the past  Blood pressure controlled  Labs per orders. Continue current plans. Refills per orders    Follow-up and Dispositions    · Return in about 4 months (around 1/29/2022) for diabetes. Reviewed plan of care. Patient and her daughter have provided input and agree with goals. This is the Subsequent Medicare Annual Wellness Exam, performed 12 months or more after the Initial AWV or the last Subsequent AWV    I have reviewed the patient's medical history in detail and updated the computerized patient record. Assessment/Plan   Education and counseling provided:  Are appropriate based on today's review and evaluation  Advance Care Plan was discussed but the patient did not wish or was not able to name a surrogate decision maker or provide an [address]         1. Type 2 diabetes mellitus without complication, without long-term current use of insulin (HCC)  -     METABOLIC PANEL, BASIC; Future  -     HEMOGLOBIN A1C WITH EAG;  Future  -      DIABETES FOOT EXAM  -     glipiZIDE (GLUCOTROL) 5 mg tablet; TAKE 1 TABLET BY MOUTH TWICE A DAY BEFORE MEALS, Normal, Disp-90 Tablet, R-1  -     dulaglutide (Trulicity) 0.97 FQ/6.3 mL sub-q pen; 0.5 mL by SubCUTAneous route every seven (7) days. , Normal, Disp-4 Pen, R-1  2. Type 2 diabetes mellitus with hyperglycemia, without long-term current use of insulin (HCC)  -     METABOLIC PANEL, BASIC; Future  -     HEMOGLOBIN A1C WITH EAG; Future  3. Essential hypertension  -     METABOLIC PANEL, BASIC; Future  4. Hypercholesterolemia  -     HEPATIC FUNCTION PANEL; Future  -     NMR LIPOPROFILE WITH LIPIDS (WITHOUT GRAPH); Future  5. Spastic hemiplegia due to cerebrovascular disease (HCC)  -     NMR LIPOPROFILE WITH LIPIDS (WITHOUT GRAPH); Future  6. Medicare annual wellness visit, subsequent  7. Vascular dementia without behavioral disturbance (HCC)       Depression Risk Factor Screening     3 most recent PHQ Screens 9/29/2021   PHQ Not Done -   Little interest or pleasure in doing things Not at all   Feeling down, depressed, irritable, or hopeless Not at all   Total Score PHQ 2 0       Alcohol Risk Screen   AUDIT Screen Score: AUDIT Score: 0      Document Brief Intervention (corresponds directly with the 5 A's, Ask, Advise, Assess, Assist, and Arrange):  NA    At- Risk Patients (Score 7-15 for women; 8-15 for men)  Discuss concern patient is drinking at unhealthy levels known to increase risk of alcohol-related health problems. Is Patient ready to commit to change? NA    If No:   Encourage reflection   Discuss short term and long term health risks of consuming alcohol   Barriers to change   Reaffirm willingness to help / Educational materials provided  If Yes:   Set goal  [employer] provided    Harmful use or Dependence (Score 16 or greater)   Discuss short term and long term health risks of consuming alcohol   Recommendations   Negotiate drinking goal   Recommend addiction specialist/center   Arrange follow-up appointments. Functional Ability and Level of Safety    Hearing: Hearing is good.       Activities of Daily Living:  ADL Assessment 9/29/2021   Feeding yourself Help Needed Getting from bed to chair No Help Needed   Getting dressed No Help Needed   Bathing or showering Help Needed   Walk across the room (includes cane/walker) No Help Needed   Using the telphone No Help Needed   Taking your medications Help Needed   Preparing meals Help Needed   Managing money (expenses/bills) Help Needed   Moderately strenuous housework (laundry) Help Needed   Shopping for personal items (toiletries/medicines) Help Needed   Shopping for groceries Help Needed   Driving Help Needed   Climbing a flight of stairs No Help Needed   Getting to places beyond walking distances Help Needed           Ambulation: with no difficulty     Fall Risk:  Fall Risk Assessment, last 12 mths 9/29/2021   Able to walk? Yes   Fall in past 12 months? 0      Abuse Screen:  Abuse Screening Questionnaire 9/29/2021   Do you ever feel afraid of your partner? N   Are you in a relationship with someone who physically or mentally threatens you? N   Is it safe for you to go home?  Y            Cognitive Screening    Has your family/caregiver stated any concerns about your memory: no     Cognitive Screening: Abnormal - Verbal Fluency Test    Health Maintenance Due     Health Maintenance Due   Topic Date Due    COVID-19 Vaccine (1) Never done    Shingrix Vaccine Age 50> (1 of 2) Never done    Eye Exam Retinal or Dilated  02/14/2019    Breast Cancer Screen Mammogram  05/23/2019    Foot Exam Q1  02/20/2021    Cervical cancer screen  05/13/2021       Patient Care Team   Patient Care Team:  Erma Velazquez MD as PCP - General (Family Medicine)  Erma Velazquez MD as PCP - [address] [place] [address] [facility]  Jordana Hernandez MD (Neurology)  Krissy Jones PsyD (Psychology)    History     Patient Active Problem List   Diagnosis Code    Status post stroke Z80.78    History of cerebral aneurysm repair Z98.890, Z86.79    Episodic tension-type headache, not intractable G44.219    Essential hypertension I10    Type 2 diabetes mellitus without complication ([facility] N44.9    Vascular dementia without behavioral disturbance ([facility] F01.50    Hx of completed stroke Z86.73    Chest pain R07.9    Migraine without aura and without status migrainosus, not intractable G43.009    Spastic hemiplegia due to cerebrovascular disease ([facility] I67.9, G81.10     Past Medical History:   Diagnosis Date    Chronic gout of multiple sites 10/24/2017    Depression     on Zoloft    Diabetes ([facility]     Essential hypertension 6/2/2016    Essential hypertension 10/24/2017    History of cerebral aneurysm repair 11/10/2015    History of stroke 03/06/2015    Migraine without aura and without status migrainosus, not intractable 12/11/2017    Spastic hemiplegia due to cerebrovascular disease ([facility] 2/17/2018    Vascular dementia ([facility]       History reviewed. No pertinent surgical history. Current Outpatient Medications   Medication Sig Dispense Refill    glipiZIDE (GLUCOTROL) 5 mg tablet TAKE 1 TABLET BY MOUTH TWICE A DAY BEFORE MEALS      ascorbic acid, vitamin C, (VITAMIN C) 500 mg tablet Take 500 mg by mouth two (2) times a day.  amLODIPine (NORVASC) 5 mg tablet Take 5 mg by mouth daily.  cholecalciferol (VITAMIN D3) (1000 Units /25 mcg) tablet Take 1,000 Units by mouth daily.  lisinopriL (PRINIVIL, ZESTRIL) 20 mg tablet TAKE 1 TABLET BY MOUTH DAILY 30 Tablet 0    dulaglutide (Trulicity) 5.09 GG/9.5 mL sub-q pen 0.5 mL by SubCUTAneous route every seven (7) days.  4 Pen 3    atorvastatin (LIPITOR) 20 mg tablet TAKE 1 TABLET BY MOUTH EVERY DAY AT NIGHT 90 Tablet 0    glucose blood VI test strips (blood glucose test) strip Test daily or as directed; please use brand covered by insurance 100 Strip 4    Insulin Needles, Disposable, 31 gauge x 5/16\" ndle Use once daily as directed 100 Package 4    Blood-Glucose Meter monitoring kit Test daily or as directed; please use brand covered by insurance 1 Kit 0    clopidogreL (PLAVIX) 75 mg tab TAKE 1 TABLET BY MOUTH EVERY DAY 90 Tablet 4    empagliflozin (Jardiance) 25 mg tablet TAKE 1 TABLET BY MOUTH EVERY DAY 80 Tab 3    Shower Chair XX caren Spastic hemiplegia due to CVA 1 Each 0    ONETOUCH ULTRA2 monitoring kit TEST BLOOD SUGAR DAILY. DX. E11.9 1 Kit 0    metFORMIN ER (GLUCOPHAGE XR) 500 mg tablet Take 1 Tab by mouth daily (with dinner). 90 Tab 1    lancets 28 gauge misc Test Blood Sugar daily. Diagnoses code: E11.9 100 Lancet 3    baclofen (LIORESAL) 10 mg tablet TAKE 1 TABLET EVERY MORNING FOR 1 WEEK THEN INCREASE TO 1 TABLET TWICE A DAY. STOP IF SEDATION. 1    sertraline (ZOLOFT) 100 mg tablet Take 1.5 Tabs by mouth daily. For depression. 90 Tab 5    multivitamin (ONE A DAY) tablet Take 1 Tab by mouth daily.  dexAMETHasone (DECADRON) 4 mg tablet TAKE ONE TABLET TWICE DAILY FOR 2 DAYS, THEN TAKE 1 TABLET DAILY UNTIL GONE (Patient not taking: Reported on 2021)      butalbital-acetaminophen-caffeine (FIORICET, ESGIC) -40 mg per tablet Take 1 to 2 tabs at onset of moderate to severe headache. May repeat 1 tablet in 4 hours. Limit use to 2 days per week.  (Patient not taking: Reported on 2021) 30 Tab 0     Allergies   Allergen Reactions    Namenda [Memantine] Other (comments)     Side effects       Family History   Problem Relation Age of Onset    Heart Attack Mother     Heart Attack Father     Headache Sister     Migraines Sister     Stroke Sister     Headache Brother     Migraines Brother      Social History     Tobacco Use    Smoking status: Former Smoker     Quit date: 2015     Years since quittin.2    Smokeless tobacco: Never Used    Tobacco comment: Never used vapor or e-cigs    Substance Use Topics    Alcohol use: No     Alcohol/week: 0.0 standard drinks         Em Chavez MD

## 2021-11-03 DIAGNOSIS — I10 ESSENTIAL HYPERTENSION: ICD-10-CM

## 2021-11-03 DIAGNOSIS — E11.9 TYPE 2 DIABETES MELLITUS WITHOUT COMPLICATION, WITHOUT LONG-TERM CURRENT USE OF INSULIN (HCC): ICD-10-CM

## 2021-11-03 RX ORDER — LISINOPRIL 20 MG/1
TABLET ORAL
Qty: 90 TABLET | Refills: 3 | Status: SHIPPED | OUTPATIENT
Start: 2021-11-03 | End: 2022-07-25

## 2021-11-11 DIAGNOSIS — E11.9 TYPE 2 DIABETES MELLITUS WITHOUT COMPLICATION, WITHOUT LONG-TERM CURRENT USE OF INSULIN (HCC): ICD-10-CM

## 2021-11-14 LAB
ALBUMIN SERPL-MCNC: 4.9 G/DL (ref 3.8–4.8)
ALP SERPL-CCNC: 105 IU/L (ref 44–121)
ALT SERPL-CCNC: 45 IU/L (ref 0–32)
AST SERPL-CCNC: 41 IU/L (ref 0–40)
BILIRUB DIRECT SERPL-MCNC: 0.16 MG/DL (ref 0–0.4)
BILIRUB SERPL-MCNC: 0.6 MG/DL (ref 0–1.2)
BUN SERPL-MCNC: 10 MG/DL (ref 8–27)
BUN/CREAT SERPL: 16 (ref 12–28)
CALCIUM SERPL-MCNC: 9.9 MG/DL (ref 8.7–10.3)
CHLORIDE SERPL-SCNC: 104 MMOL/L (ref 96–106)
CHOLEST SERPL-MCNC: 113 MG/DL (ref 100–199)
CO2 SERPL-SCNC: 24 MMOL/L (ref 20–29)
CREAT SERPL-MCNC: 0.64 MG/DL (ref 0.57–1)
EST. AVERAGE GLUCOSE BLD GHB EST-MCNC: 171 MG/DL
GLUCOSE SERPL-MCNC: 94 MG/DL (ref 65–99)
HBA1C MFR BLD: 7.6 % (ref 4.8–5.6)
HDL SERPL-SCNC: 38.5 UMOL/L
HDLC SERPL-MCNC: 51 MG/DL
IMP & REVIEW OF LAB RESULTS: NORMAL
LDL SERPL QN: 19.7 NM
LDL SERPL-SCNC: 490 NMOL/L
LDL SMALL SERPL-SCNC: 303 NMOL/L
LDLC SERPL CALC-MCNC: 41 MG/DL (ref 0–99)
LP-IR SCORE SERPL: 46
POTASSIUM SERPL-SCNC: 4 MMOL/L (ref 3.5–5.2)
PROT SERPL-MCNC: 8.1 G/DL (ref 6–8.5)
SODIUM SERPL-SCNC: 143 MMOL/L (ref 134–144)
TRIGL SERPL-MCNC: 117 MG/DL (ref 0–149)

## 2021-11-14 RX ORDER — GLIPIZIDE 5 MG/1
TABLET ORAL
Qty: 180 TABLET | Refills: 3 | Status: SHIPPED | OUTPATIENT
Start: 2021-11-14 | End: 2022-10-11

## 2021-11-14 RX ORDER — EMPAGLIFLOZIN 10 MG/1
TABLET, FILM COATED ORAL
Qty: 90 TABLET | Refills: 3 | OUTPATIENT
Start: 2021-11-14

## 2021-11-19 DIAGNOSIS — I10 ESSENTIAL HYPERTENSION: Primary | ICD-10-CM

## 2021-11-19 DIAGNOSIS — R79.89 ELEVATED LFTS: ICD-10-CM

## 2021-11-23 ENCOUNTER — VIRTUAL VISIT (OUTPATIENT)
Dept: FAMILY MEDICINE CLINIC | Age: 61
End: 2021-11-23
Payer: MEDICARE

## 2021-11-23 DIAGNOSIS — I10 ESSENTIAL HYPERTENSION: Primary | ICD-10-CM

## 2021-11-23 DIAGNOSIS — Z12.31 ENCOUNTER FOR SCREENING MAMMOGRAM FOR MALIGNANT NEOPLASM OF BREAST: ICD-10-CM

## 2021-11-23 DIAGNOSIS — E11.9 TYPE 2 DIABETES MELLITUS WITHOUT COMPLICATION, WITHOUT LONG-TERM CURRENT USE OF INSULIN (HCC): ICD-10-CM

## 2021-11-23 PROCEDURE — 99443 PR PHYS/QHP TELEPHONE EVALUATION 21-30 MIN: CPT | Performed by: FAMILY MEDICINE

## 2021-11-23 RX ORDER — DULAGLUTIDE 1.5 MG/.5ML
1.5 INJECTION, SOLUTION SUBCUTANEOUS
Qty: 12 EACH | Refills: 0 | Status: SHIPPED | OUTPATIENT
Start: 2021-11-23 | End: 2022-01-27 | Stop reason: DRUGHIGH

## 2021-11-23 RX ORDER — AMLODIPINE BESYLATE 10 MG/1
10 TABLET ORAL DAILY
Qty: 90 TABLET | Refills: 0 | Status: SHIPPED | OUTPATIENT
Start: 2021-11-23 | End: 2021-12-11

## 2021-11-23 NOTE — PROGRESS NOTES
Olena Park is a 64 y.o. female evaluated via telephone on 11/23/2021. Consent:  She and/or health care decision maker is aware that she may receive a bill for this telephone service, depending on her insurance coverage, and has provided verbal consent to proceed: Yes      Documentation:  I communicated with the patient and/or health care decision maker about HTN and diabetes. Details of this discussion including any medical advice provided:       Subjective:   Olena Park was seen for Diabetes (follow up/pt does not have an updated BP )      Patient presents with:  Diabetes: follow up/pt does not have an updated BP     Her daughter checked her blood pressure 4 days ago. She also needs follow up on her diabetes. Her recent A1C was 7.6. Also, her liver function tests were mildly elevated. Review of Systems   Eyes: Negative for blurred vision. Respiratory: Negative for shortness of breath. Cardiovascular: Negative for chest pain. Genitourinary:        No polyuria   Neurological: Negative for dizziness, sensory change, speech change, focal weakness and headaches. Endo/Heme/Allergies: Negative for polydipsia. Objective:     /90    Assessment & Plan:   Diagnoses and all orders for this visit:    1. Essential hypertension  -     amLODIPine (NORVASC) 10 mg tablet; Take 1 Tablet by mouth daily. 2. Type 2 diabetes mellitus without complication, without long-term current use of insulin (HCC)  -     dulaglutide (Trulicity) 1.5 ME/6.1 mL sub-q pen; 0.5 mL by SubCUTAneous route every seven (7) days. 3. Encounter for screening mammogram for malignant neoplasm of breast  -     SIVAKUMAR MAMMO BI SCREENING INCL CAD; Future        Blood pressure mildly elevated  Diabetes needs better control  Increase Norvasc  Increase Trulicity  Mammogram     Follow-up and Dispositions    · Return in about 6 weeks (around 1/4/2022) for blood pressure, diabetes, discuss LFTs.            Reviewed plan of care.  Patient has provided input and agrees with goals. I affirm this is a Patient Initiated Episode with an Established Patient who has not had a related appointment within my department in the past 7 days or scheduled within the next 24 hours.     Total Time: minutes: 21-30 minutes    Note: not billable if this call serves to triage the patient into an appointment for the relevant concern      Lynn Rios MD

## 2021-12-08 ENCOUNTER — TELEPHONE (OUTPATIENT)
Dept: PHARMACY | Age: 61
End: 2021-12-08

## 2021-12-08 NOTE — LETTER
[address]  [address], [address] [address]         12/13/21     Dear Donn Good,    We tried to reach you recently regarding your atorvastatin 20 mg tablets, but were unable to reach you on the telephone. We have on file that you are currently taking atorvastatin 20 mg tablets- take 1 tablet by mouth every day at night. If you are no longer taking or taking differently, please call us at the number below so that we can discuss this and update your medication profile. It appears that this medication has not been filled at proper times. We are worried you might be missing doses or not taking it as directed. It is important that you take your medications regularly and try not to miss a single dose.     Some ways to help you remember to take and refill your medications are to:  · Use a pill box, set an alarm, and/or keep your medication near something that you do every day  · Fill a 3-month supply of your prescription at a time to save you time and trips to the pharmacy - if you would like assistance in switching your prescriptions to a 3-month supply, please contact us  · Ask your pharmacy if they participate in [facility]", a program where you can  all of your medications on the same day  · Ask your pharmacy if you can be set up with automatic refill, where they will automatically refill your prescription when it is due and let you know it's ready to     Sincerely,   [address] [place] [address] free: [phone], option 2

## 2021-12-08 NOTE — TELEPHONE ENCOUNTER
CLINICAL PHARMACY: ADHERENCE REVIEW  Identified care gap per Ntay; fills at [facility]: ACE/ARB, Diabetes and Statin adherence    Last Visit: 11/23/21    Patient identified as LIS = 3, therefore patient's co-pays are $0.00 through all phases of the benefit regardless of the days' supply dispensed    ASSESSMENT  ACE/ARB ADHERENCE    Per Insurance Records through 12/5/21 (2020 [facility] = n/a%; [facility] Potential Fail Date: PASS 2021):   Lisinopril 20 mg tablets last filled on 11/3/21 for 90 day supply. Next refill due: 2/1/22, max refill due 3/14/22    BP Readings from Last 3 Encounters:   09/29/21 133/71   02/20/20 161/82   10/21/19 154/84     Estimated Creatinine Clearance: 76.7 mL/min (by C-G formula based on SCr of 0.64 mg/dL). DIABETES ADHERENCE    Per Insurance Records through 12/5/21 (2020 [facility] = n/a%; YTD PDC = 100%; Potential Fail Date: PASS 2021):   Glipizide 5 mg tablets last filled on 9/29/21 for 90 day supply. Next refill due: 48/63/95  Trulicity last filled on 11/23/21 for 90 day supply. Next refill due: 2/21/22    Lab Results   Component Value Date/Time    Hemoglobin A1c 7.6 (H) 11/12/2021 09:23 AM    Hemoglobin A1c 8.7 (H) 05/29/2021 10:06 AM    Hemoglobin A1c 8.8 (H) 11/12/2020 08:49 AM     NOTE A1c <9%    STATIN ADHERENCE    Per Insurance Records through 12/5/21 (2020 [facility] = n/a%; YTD PDC = 81%; Potential Fail Date: 12/13/21): Atorvastatin 20 mg tablets last filled on 6/17/21 for 90 day supply. Next refill due: 9/15/21. MAX refill due 11/23/21. Per 6/2/21 pharmacy encounter, \"patient picked up atorvastatin 10 mg tablets 6/11/21, then updated atorvastatin 20 mg tablets filled 6/17/21 on 6/21/21. \"    Per [facility]:   Atorvastatin 20 mg tablets last picked up on 6/17/21 for 90 day supply. 1 refills remaining.  Billed through [facility] Rx Value Date/Time    Cholesterol, total 165 05/29/2021 10:06 AM    Cholesterol, Total 113 11/12/2021 09:23 AM    HDL Cholesterol 51 05/29/2021 10:06 AM    LDL, calculated 96 05/29/2021 10:06 AM    LDL, calculated 37 04/14/2017 09:32 AM    VLDL, calculated 18 05/29/2021 10:06 AM    VLDL, calculated 17 04/14/2017 09:32 AM    Triglyceride 95 05/29/2021 10:06 AM     ALT (SGPT)   Date Value Ref Range Status   11/12/2021 45 (H) 0 - 32 IU/L Final     AST (SGOT)   Date Value Ref Range Status   11/12/2021 41 (H) 0 - 40 IU/L Final     The ASCVD Risk score (Sherif Olivares et al., 2013) failed to calculate for the following reasons: The valid total cholesterol range is 130 to 320 mg/dL     PLAN  The following are interventions that have been identified:  - Patient overdue refilling Atorvastatin 20 mg tablets and active on home medication list. Ready at pharmacy for . Attempting to reach patient to review.  Voicemail box full. Unable to leave message.     Future Appointments   Date Time Provider Chica Garcia   1/19/2022 11:00 AM Dante Marin MD [facility] AMB     Sandra Cary, PharmD, [facility], [address]  Toll free: [phone], option 2

## 2021-12-11 DIAGNOSIS — I10 ESSENTIAL HYPERTENSION: ICD-10-CM

## 2021-12-11 RX ORDER — AMLODIPINE BESYLATE 10 MG/1
TABLET ORAL
Qty: 90 TABLET | Refills: 0 | Status: SHIPPED | OUTPATIENT
Start: 2021-12-11 | End: 2022-02-15

## 2021-12-13 NOTE — TELEPHONE ENCOUNTER
Second attempt made to contact patient. Per [facility] at[facility]vastatin still ready for . Left voice message to return call to [phone], option 2. Letter sent.     Sandra Cary, PharmD, [facility], [address]  Toll free: [phone], option 2    For Pharmacy [address] in place: No   Recommendation Provided To: Pharmacy: 1   Gap Closed?: No   Intervention Accepted By: Pharmacy: 1   Time Spent (min): 15

## 2021-12-31 DIAGNOSIS — E78.00 HYPERCHOLESTEROLEMIA: ICD-10-CM

## 2022-01-02 RX ORDER — ATORVASTATIN CALCIUM 20 MG/1
TABLET, FILM COATED ORAL
Qty: 90 TABLET | Refills: 3 | Status: SHIPPED | OUTPATIENT
Start: 2022-01-02

## 2022-01-14 DIAGNOSIS — E11.9 TYPE 2 DIABETES MELLITUS WITHOUT COMPLICATION, WITHOUT LONG-TERM CURRENT USE OF INSULIN (HCC): ICD-10-CM

## 2022-01-27 ENCOUNTER — VIRTUAL VISIT (OUTPATIENT)
Dept: FAMILY MEDICINE CLINIC | Age: 62
End: 2022-01-27
Payer: MEDICARE

## 2022-01-27 DIAGNOSIS — I67.9: ICD-10-CM

## 2022-01-27 DIAGNOSIS — E11.65 TYPE 2 DIABETES MELLITUS WITH HYPERGLYCEMIA, WITHOUT LONG-TERM CURRENT USE OF INSULIN (HCC): Primary | ICD-10-CM

## 2022-01-27 DIAGNOSIS — F01.50 VASCULAR DEMENTIA WITHOUT BEHAVIORAL DISTURBANCE (HCC): ICD-10-CM

## 2022-01-27 DIAGNOSIS — I10 ESSENTIAL HYPERTENSION: ICD-10-CM

## 2022-01-27 DIAGNOSIS — G81.10: ICD-10-CM

## 2022-01-27 DIAGNOSIS — E11.9 TYPE 2 DIABETES MELLITUS WITHOUT COMPLICATION, WITHOUT LONG-TERM CURRENT USE OF INSULIN (HCC): ICD-10-CM

## 2022-01-27 PROCEDURE — 99443 PR PHYS/QHP TELEPHONE EVALUATION 21-30 MIN: CPT | Performed by: FAMILY MEDICINE

## 2022-01-27 RX ORDER — DULAGLUTIDE 3 MG/.5ML
3 INJECTION, SOLUTION SUBCUTANEOUS
Qty: 4 EACH | Refills: 1 | Status: SHIPPED | OUTPATIENT
Start: 2022-01-27 | End: 2022-03-31 | Stop reason: SDUPTHER

## 2022-01-27 NOTE — PROGRESS NOTES
Chief Complaint   Patient presents with    Diabetes     Follow up     1. Have you been to the ER, urgent care clinic since your last visit? Hospitalized since your last visit? No    2. Have you seen or consulted any other health care providers outside of the [address] [place] [address] since your last visit? Include any pap smears or colon screening.  No

## 2022-01-27 NOTE — PROGRESS NOTES
Nishant Rowan is a 64 y.o. female evaluated via telephone on 1/27/2022. Consent:  She and/or health care decision maker is aware that she may receive a bill for this telephone service, depending on her insurance coverage, and has provided verbal consent to proceed: Yes      Documentation:  I communicated with the patient and/or health care decision maker about her diabetes and HTN. Details of this discussion including any medical advice provided:       Subjective:   Nishant Rowan was seen for Diabetes (Follow up)      Patient presents with:  Diabetes: Follow up    Her A1C was 7.6 in November. Compliant with meds. Also, she needs to follow up on her blood pressure. Her last eye exam was over a year ago. She has hemiparesis due to a CVA which has not changed. Also, she has mild vascular dementia. Her daughter manages her meds and money. Review of Systems   Eyes: Negative for blurred vision. Respiratory: Negative for shortness of breath. Cardiovascular: Negative for chest pain. Genitourinary:        No polyuria   Neurological: Positive for focal weakness. Negative for dizziness, sensory change, speech change and headaches. Endo/Heme/Allergies: Negative for polydipsia. Objective:     /66    Assessment & Plan:   Diagnoses and all orders for this visit:    1. Type 2 diabetes mellitus with hyperglycemia, without long-term current use of insulin ([address]    2. Type 2 diabetes mellitus without complication, without long-term current use of insulin (HCC)  -     dulaglutide (Trulicity) 3 US/8.8 mL pnij; 3 mg by SubCUTAneous route every seven (7) days.  -     REFERRAL TO OPHTHALMOLOGY    3. Essential hypertension    4. Spastic hemiplegia due to cerebrovascular disease ([address]    5. Vascular dementia without behavioral disturbance (HCC)        Uncontrolled diabetes  SBP high, DBP too low to adjust meds  Increase Trulicity  Eye exam  At the end of her visit, I spoke with her daughter. Her mom is having dental problems and has an appointment scheduled about this in March. Follow-up and Dispositions    · Return in about 6 weeks (around 3/10/2022) for diabetes, blood pressure. Reviewed plan of care. Patient and has daughter have provided input and agree with goals. I affirm this is a Patient Initiated Episode with an Established Patient who has not had a related appointment within my department in the past 7 days or scheduled within the next 24 hours.     Total Time: minutes: 21-30 minutes    Note: not billable if this call serves to triage the patient into an appointment for the relevant concern      Janusz Suero MD

## 2022-01-27 NOTE — PROGRESS NOTES
Leti Huber is a 64 y.o. female who was seen by synchronous (real-time) audio-video technology on 1/27/2022. Assessment & Plan:   {There are no diagnoses linked to this encounter. (Refresh or delete this SmartLink)}    ***    {Assessment and Plan:60089}      Reviewed plan of care. Patient has provided input and agrees with goals. CPT Codes 15700-43943 for Established Patients may apply to this Telehealth Visit  {Time-based coding, delete if not needed:90247::\"I spent at least 15 minutes with this established patient, and >50% of the time was spent counseling and/or coordinating care regarding ***\"}    Subjective:   Leti Huber was seen for Diabetes (Follow up)      HPI      ROS      Objective:     BP Readings from Last 3 Encounters:   09/29/21 133/71   02/20/20 161/82   10/21/19 154/84       Physical Exam    Due to this being a TeleHealth evaluation, many elements of the physical examination are unable to be assessed. We discussed the expected course, resolution and complications of the diagnosis(es) in detail. Medication risks, benefits, costs, interactions, and alternatives were discussed as indicated. I advised her to contact the office if her condition worsens, changes or fails to improve as anticipated. She expressed understanding with the diagnosis(es) and plan. Pursuant to the emergency declaration under the [facility] waiver authority and the [employer] and [employer] Act, this Virtual  Visit was conducted, with patient's consent, to reduce the patient's risk of exposure to COVID-19 and provide continuity of care for an established patient. Services were provided through a video synchronous discussion virtually to substitute for in-person clinic visit.     Blaine Alberts MD

## 2022-02-15 DIAGNOSIS — I10 ESSENTIAL HYPERTENSION: ICD-10-CM

## 2022-02-15 RX ORDER — AMLODIPINE BESYLATE 10 MG/1
TABLET ORAL
Qty: 90 TABLET | Refills: 3 | Status: SHIPPED | OUTPATIENT
Start: 2022-02-15

## 2022-03-15 NOTE — PROGRESS NOTES
HISTORY OF PRESENT ILLNESS  Caleb Gurrola is a 62 y.o. female. HPI Comments: Caleb Gurrola is here with her daughter for diabetic and HTN follow up. Her daughter gives most of the history due to the patient's dementia  Their  FBS have been . This is a chronic problem which has not changed. Denies chest pain, abdominal pain, or shortness of breath. She is still having problems with headaches and just saw neurology 2 weeks ago for them. Her propranolol was increased,  It is worsened by nothing, and improved by lifestyle modifications, which are working moderately. Review of Systems   Constitutional: Negative for weight loss. No weight gain   Eyes: Negative for blurred vision. Respiratory: Negative for shortness of breath. Cardiovascular: Negative for chest pain and leg swelling. Gastrointestinal: Negative for abdominal pain. Genitourinary:        Polyuria   Neurological: Positive for focal weakness. Negative for dizziness, sensory change and speech change. Left leg weakness due to previous CVA   Endo/Heme/Allergies: Negative for polydipsia. Lab Results   Component Value Date/Time    Hemoglobin A1c 7.1 (H) 02/14/2018 09:24 AM           Visit Vitals    /64    Pulse (!) 57    Temp 97.8 °F (36.6 °C) (Oral)    Resp 18    Ht 5' 6\" (1.676 m)    Wt 112 lb (50.8 kg)    BMI 18.08 kg/m2     BP Readings from Last 3 Encounters:   04/25/18 144/64   04/09/18 130/58   02/06/18 125/44     Physical Exam   Constitutional: She is oriented to person, place, and time. She appears well-developed and well-nourished. No distress. Cardiovascular: Normal rate, regular rhythm and normal heart sounds. Exam reveals no gallop and no friction rub. No murmur heard. Pulmonary/Chest: Effort normal and breath sounds normal. No respiratory distress. She has no wheezes. She has no rales. Musculoskeletal: She exhibits no edema.    Neurological: She is alert and oriented to person, place, [Adequate] : adequate and time. Skin: Skin is warm and dry. She is not diaphoretic. Nursing note and vitals reviewed. ASSESSMENT and PLAN    ICD-10-CM ICD-9-CM    1. Type 2 diabetes mellitus without complication, without long-term current use of insulin (HCC) E11.9 250.00 lisinopril (PRINIVIL, ZESTRIL) 5 mg tablet      metFORMIN ER (GLUCOPHAGE XR) 500 mg tablet   2. Essential hypertension I10 401.9 lisinopril (PRINIVIL, ZESTRIL) 5 mg tablet   3. Vascular dementia without behavioral disturbance F01.50 290.40    4. Breast cancer screening Z12.31 V76.10 [facility] MAMMO BI SCREENING INCL CAD        Diabetes controlled  Blood pressure mildly elevated  Unable to obtain much of a history from the patient due to her dementia  Add lisinopril  Mammogram     Follow-up Disposition:  Return in about 2 months (around 6/25/2018) for diabetes, blood pressure. Reviewed plan of care. Patient has provided input and agrees with goals.

## 2022-03-19 PROBLEM — G81.10: Status: ACTIVE | Noted: 2018-02-17

## 2022-03-19 PROBLEM — G43.009 MIGRAINE WITHOUT AURA AND WITHOUT STATUS MIGRAINOSUS, NOT INTRACTABLE: Status: ACTIVE | Noted: 2017-12-11

## 2022-03-19 PROBLEM — I67.9: Status: ACTIVE | Noted: 2018-02-17

## 2022-03-31 ENCOUNTER — TELEPHONE (OUTPATIENT)
Dept: FAMILY MEDICINE CLINIC | Age: 62
End: 2022-03-31

## 2022-03-31 ENCOUNTER — VIRTUAL VISIT (OUTPATIENT)
Dept: FAMILY MEDICINE CLINIC | Age: 62
End: 2022-03-31
Payer: MEDICARE

## 2022-03-31 DIAGNOSIS — R79.89 ELEVATED LFTS: ICD-10-CM

## 2022-03-31 DIAGNOSIS — I10 ESSENTIAL HYPERTENSION: ICD-10-CM

## 2022-03-31 DIAGNOSIS — E11.9 TYPE 2 DIABETES MELLITUS WITHOUT COMPLICATION, WITHOUT LONG-TERM CURRENT USE OF INSULIN (HCC): Primary | ICD-10-CM

## 2022-03-31 DIAGNOSIS — Z86.73 HX OF COMPLETED STROKE: ICD-10-CM

## 2022-03-31 DIAGNOSIS — F01.50 VASCULAR DEMENTIA WITHOUT BEHAVIORAL DISTURBANCE (HCC): ICD-10-CM

## 2022-03-31 PROCEDURE — G8420 CALC BMI NORM PARAMETERS: HCPCS | Performed by: FAMILY MEDICINE

## 2022-03-31 PROCEDURE — 2022F DILAT RTA XM EVC RTNOPTHY: CPT | Performed by: FAMILY MEDICINE

## 2022-03-31 PROCEDURE — G8756 NO BP MEASURE DOC: HCPCS | Performed by: FAMILY MEDICINE

## 2022-03-31 PROCEDURE — G8427 DOCREV CUR MEDS BY ELIG CLIN: HCPCS | Performed by: FAMILY MEDICINE

## 2022-03-31 PROCEDURE — 99214 OFFICE O/P EST MOD 30 MIN: CPT | Performed by: FAMILY MEDICINE

## 2022-03-31 PROCEDURE — 3017F COLORECTAL CA SCREEN DOC REV: CPT | Performed by: FAMILY MEDICINE

## 2022-03-31 PROCEDURE — G9899 SCRN MAM PERF RSLTS DOC: HCPCS | Performed by: FAMILY MEDICINE

## 2022-03-31 PROCEDURE — 3046F HEMOGLOBIN A1C LEVEL >9.0%: CPT | Performed by: FAMILY MEDICINE

## 2022-03-31 PROCEDURE — G8432 DEP SCR NOT DOC, RNG: HCPCS | Performed by: FAMILY MEDICINE

## 2022-03-31 RX ORDER — DULAGLUTIDE 3 MG/.5ML
3 INJECTION, SOLUTION SUBCUTANEOUS
Qty: 12 EACH | Refills: 1 | Status: SHIPPED | OUTPATIENT
Start: 2022-03-31 | End: 2022-07-21

## 2022-03-31 NOTE — PROGRESS NOTES
Akosua Lovell is a 64 y.o. female who was seen by synchronous (real-time) audio-video technology on 3/31/2022. Assessment & Plan:   Diagnoses and all orders for this visit:    1. Type 2 diabetes mellitus without complication, without long-term current use of insulin (HCC)  -     HEMOGLOBIN A1C WITH EAG; Future  -     dulaglutide (Trulicity) 3 DG/1.8 mL pnij; 3 mg by SubCUTAneous route every seven (7) days. 2. Essential hypertension    3. Hx of completed stroke    4. Vascular dementia without behavioral disturbance (HCC)    5. Elevated LFTs  -     HEPATIC FUNCTION PANEL; Future        Diabetes seems to be controlled  Blood pressure controlled  Should be fine to briefly stop Plavix  Labs per orders. Continue current plans. Refills per orders    Follow-up and Dispositions    · Return in about 4 months (around 7/31/2022) for diabetes, blood pressure. Reviewed plan of care. Patient and her daughter have provided input and agree with goals. CPT Codes 61206-56797 for Established Patients may apply to this Telehealth Visit      Subjective:   Akosua Lovell was seen for Diabetes (Follow up. Wants to discuss upcoming dental surgery.)      Patient presents with:  Diabetes: Follow up. Wants to discuss upcoming dental surgery. Also, she needs to follow up on her diabetes and blood pressure. Her FBS have been between 90 and 100. Evidently, she will be having multiple dental procedures this summer including multiple fillings, wisdom teeth removal and a root canal.  They need permission to take her off her Plavix for 24 hours. She had a CVA in 2015. Her daughter, Anna Sharpe, is with her and helps with the history due to her mother's dementia. Review of Systems   Eyes: Negative for blurred vision. Respiratory: Negative for shortness of breath. Cardiovascular: Negative for chest pain. Genitourinary:        No polyuria   Neurological: Positive for focal weakness.  Negative for dizziness, sensory change, speech change and headaches. Endo/Heme/Allergies: Negative for polydipsia. Objective:   /75    Physical Exam  Constitutional:       General: She is not in acute distress. Appearance: Normal appearance. Neurological:      Mental Status: She is alert. Due to this being a TeleHealth evaluation, many elements of the physical examination are unable to be assessed. We discussed the expected course, resolution and complications of the diagnosis(es) in detail. Medication risks, benefits, costs, interactions, and alternatives were discussed as indicated. I advised her to contact the office if her condition worsens, changes or fails to improve as anticipated. She expressed understanding with the diagnosis(es) and plan. Pursuant to the emergency declaration under the [facility], [facility] waiver authority and the [facility] and [employer] Act, this Virtual  Visit was conducted, with patient's consent, to reduce the patient's risk of exposure to COVID-19 and provide continuity of care for an established patient. Services were provided through a video synchronous discussion virtually to substitute for in-person clinic visit.     Heather Kruse MD

## 2022-03-31 NOTE — PROGRESS NOTES
Chief Complaint   Patient presents with    Diabetes     Follow up. Wants to discuss upcoming dental surgery. 1. Have you been to the ER, urgent care clinic since your last visit? Hospitalized since your last visit? No    2. Have you seen or consulted any other health care providers outside of the [address] [place] [address] since your last visit? Include any pap smears or colon screening. No    .

## 2022-07-04 RX ORDER — CLOPIDOGREL BISULFATE 75 MG/1
TABLET ORAL
Qty: 90 TABLET | Refills: 4 | Status: SHIPPED | OUTPATIENT
Start: 2022-07-04

## 2022-07-07 ENCOUNTER — TELEPHONE (OUTPATIENT)
Dept: FAMILY MEDICINE CLINIC | Age: 62
End: 2022-07-07

## 2022-07-07 ENCOUNTER — VIRTUAL VISIT (OUTPATIENT)
Dept: FAMILY MEDICINE CLINIC | Age: 62
End: 2022-07-07
Payer: MEDICARE

## 2022-07-07 DIAGNOSIS — R79.89 ELEVATED LFTS: ICD-10-CM

## 2022-07-07 DIAGNOSIS — E11.9 TYPE 2 DIABETES MELLITUS WITHOUT COMPLICATION, WITHOUT LONG-TERM CURRENT USE OF INSULIN (HCC): Primary | ICD-10-CM

## 2022-07-07 DIAGNOSIS — I10 ESSENTIAL HYPERTENSION: ICD-10-CM

## 2022-07-07 PROCEDURE — 99443 PR PHYS/QHP TELEPHONE EVALUATION 21-30 MIN: CPT | Performed by: FAMILY MEDICINE

## 2022-07-07 NOTE — PROGRESS NOTES
Chief Complaint   Patient presents with    Diabetes     Follow up. 1. Have you been to the ER, urgent care clinic since your last visit? Hospitalized since your last visit? No    2. Have you seen or consulted any other health care providers outside of the [address] [place] [address] since your last visit? Include any pap smears or colon screening.  No

## 2022-07-07 NOTE — TELEPHONE ENCOUNTER
Patient was calling because call was disconnected during appointment. I told ECC to tell patient that I would send message for  To call back.

## 2022-07-07 NOTE — PROGRESS NOTES
Maribel Mcneal is a 58 y.o. female evaluated via telephone on 7/7/2022. Consent:  She and/or health care decision maker is aware that she may receive a bill for this telephone service, depending on her insurance coverage, and has provided verbal consent to proceed: Yes      Documentation:  I communicated with the patient and/or health care decision maker about her diabetes and HTN. Details of this discussion including any medical advice provided:       Subjective:   Maribel Mcneal was seen for Diabetes (Follow up.)      Patient presents with:  Diabetes: Follow up. She also needs to follow up on her HTN and elevated liver function tests. Review of Systems   Eyes: Negative for blurred vision. Respiratory: Negative for shortness of breath. Cardiovascular: Negative for chest pain. Genitourinary:        No polyuria   Neurological: Negative for dizziness, sensory change, speech change, focal weakness and headaches. Endo/Heme/Allergies: Negative for polydipsia. Objective:     /80, P 62    Assessment & Plan:   Diagnoses and all orders for this visit:    1. Type 2 diabetes mellitus without complication, without long-term current use of insulin (HCC)  -     METABOLIC PANEL, BASIC; Future  -     HEMOGLOBIN A1C WITH EAG; Future  -     MICROALBUMIN, UR, RAND W/ MICROALB/CREAT RATIO; Future    2. Essential hypertension  -     METABOLIC PANEL, BASIC; Future    3. Elevated LFTs  -     HEPATIC FUNCTION PANEL; Future        Blood pressure controlled  Labs per orders. Continue current plans. Follow-up and Dispositions    · Return in about 4 months (around 11/7/2022) for diabetes, blood pressure. Reviewed plan of care. Patient has provided input and agrees with goals. I affirm this is a Patient Initiated Episode with an Established Patient who has not had a related appointment within my department in the past 7 days or scheduled within the next 24 hours.     Total Time: minutes: 21-30 minutes    Note: not billable if this call serves to triage the patient into an appointment for the relevant concern      Racquel Hopkins MD

## 2022-07-21 DIAGNOSIS — E11.9 TYPE 2 DIABETES MELLITUS WITHOUT COMPLICATION, WITHOUT LONG-TERM CURRENT USE OF INSULIN (HCC): ICD-10-CM

## 2022-07-21 RX ORDER — DULAGLUTIDE 3 MG/.5ML
INJECTION, SOLUTION SUBCUTANEOUS
Qty: 12 EACH | Refills: 1 | Status: SHIPPED | OUTPATIENT
Start: 2022-07-21 | End: 2022-07-25 | Stop reason: SDUPTHER

## 2022-07-24 DIAGNOSIS — E11.9 TYPE 2 DIABETES MELLITUS WITHOUT COMPLICATION, WITHOUT LONG-TERM CURRENT USE OF INSULIN (HCC): ICD-10-CM

## 2022-07-24 DIAGNOSIS — I10 ESSENTIAL HYPERTENSION: ICD-10-CM

## 2022-07-25 DIAGNOSIS — E11.9 TYPE 2 DIABETES MELLITUS WITHOUT COMPLICATION, WITHOUT LONG-TERM CURRENT USE OF INSULIN (HCC): ICD-10-CM

## 2022-07-25 RX ORDER — LISINOPRIL 20 MG/1
TABLET ORAL
Qty: 90 TABLET | Refills: 3 | Status: SHIPPED | OUTPATIENT
Start: 2022-07-25

## 2022-07-26 DIAGNOSIS — E11.9 TYPE 2 DIABETES MELLITUS WITHOUT COMPLICATION, WITHOUT LONG-TERM CURRENT USE OF INSULIN (HCC): ICD-10-CM

## 2022-07-27 LAB
ALBUMIN SERPL-MCNC: 4.5 G/DL (ref 3.8–4.8)
ALBUMIN/CREAT UR: 7 MG/G CREAT (ref 0–29)
ALP SERPL-CCNC: 85 IU/L (ref 44–121)
ALT SERPL-CCNC: 22 IU/L (ref 0–32)
AST SERPL-CCNC: 21 IU/L (ref 0–40)
BILIRUB DIRECT SERPL-MCNC: 0.11 MG/DL (ref 0–0.4)
BILIRUB SERPL-MCNC: 0.3 MG/DL (ref 0–1.2)
BUN SERPL-MCNC: 7 MG/DL (ref 8–27)
BUN/CREAT SERPL: 12 (ref 12–28)
CALCIUM SERPL-MCNC: 10.1 MG/DL (ref 8.7–10.3)
CHLORIDE SERPL-SCNC: 105 MMOL/L (ref 96–106)
CO2 SERPL-SCNC: 23 MMOL/L (ref 20–29)
CREAT SERPL-MCNC: 0.58 MG/DL (ref 0.57–1)
CREAT UR-MCNC: 117.7 MG/DL
EGFR: 102 ML/MIN/1.73
EST. AVERAGE GLUCOSE BLD GHB EST-MCNC: 148 MG/DL
GLUCOSE SERPL-MCNC: 90 MG/DL (ref 65–99)
HBA1C MFR BLD: 6.8 % (ref 4.8–5.6)
MICROALBUMIN UR-MCNC: 8.6 UG/ML
POTASSIUM SERPL-SCNC: 4.1 MMOL/L (ref 3.5–5.2)
PROT SERPL-MCNC: 7.1 G/DL (ref 6–8.5)
SODIUM SERPL-SCNC: 148 MMOL/L (ref 134–144)

## 2022-07-28 RX ORDER — DULAGLUTIDE 3 MG/.5ML
3 INJECTION, SOLUTION SUBCUTANEOUS
Qty: 12 EACH | Refills: 3 | Status: SHIPPED | OUTPATIENT
Start: 2022-07-28 | End: 2022-07-28 | Stop reason: SDUPTHER

## 2022-08-02 RX ORDER — DULAGLUTIDE 3 MG/.5ML
3 INJECTION, SOLUTION SUBCUTANEOUS
Qty: 12 EACH | Refills: 1 | Status: SHIPPED | OUTPATIENT
Start: 2022-08-02 | End: 2022-09-16 | Stop reason: SDUPTHER

## 2022-08-03 ENCOUNTER — TELEPHONE (OUTPATIENT)
Dept: FAMILY MEDICINE CLINIC | Age: 62
End: 2022-08-03

## 2022-08-03 NOTE — TELEPHONE ENCOUNTER
Pt requesting call back with results of her labs done last week, especially her glucose levels.  Kayla

## 2022-09-06 ENCOUNTER — TELEPHONE (OUTPATIENT)
Dept: FAMILY MEDICINE CLINIC | Age: 62
End: 2022-09-06

## 2022-09-06 NOTE — TELEPHONE ENCOUNTER
Pt called to see if she's due for more diabetes labs and if due for any labs, wants call back to advise if orders can be faxed to [facility] in [place]. Please advise at [phone].  Kayla

## 2022-09-07 ENCOUNTER — TELEPHONE (OUTPATIENT)
Dept: FAMILY MEDICINE CLINIC | Age: 62
End: 2022-09-07

## 2022-09-16 DIAGNOSIS — E11.9 TYPE 2 DIABETES MELLITUS WITHOUT COMPLICATION, WITHOUT LONG-TERM CURRENT USE OF INSULIN (HCC): ICD-10-CM

## 2022-09-20 RX ORDER — DULAGLUTIDE 3 MG/.5ML
3 INJECTION, SOLUTION SUBCUTANEOUS
Qty: 12 EACH | Refills: 3 | Status: SHIPPED | OUTPATIENT
Start: 2022-09-20 | End: 2022-10-17 | Stop reason: SDUPTHER

## 2022-10-08 DIAGNOSIS — E11.9 TYPE 2 DIABETES MELLITUS WITHOUT COMPLICATION, WITHOUT LONG-TERM CURRENT USE OF INSULIN (HCC): ICD-10-CM

## 2022-10-11 RX ORDER — GLIPIZIDE 5 MG/1
TABLET ORAL
Qty: 180 TABLET | Refills: 3 | Status: SHIPPED | OUTPATIENT
Start: 2022-10-11

## 2022-10-17 DIAGNOSIS — E11.9 TYPE 2 DIABETES MELLITUS WITHOUT COMPLICATION, WITHOUT LONG-TERM CURRENT USE OF INSULIN (HCC): ICD-10-CM

## 2022-10-18 RX ORDER — DULAGLUTIDE 3 MG/.5ML
3 INJECTION, SOLUTION SUBCUTANEOUS
Qty: 12 EACH | Refills: 3 | Status: SHIPPED | OUTPATIENT
Start: 2022-10-18

## 2022-11-03 ENCOUNTER — TELEPHONE (OUTPATIENT)
Dept: FAMILY MEDICINE CLINIC | Age: 62
End: 2022-11-03

## 2022-11-03 ENCOUNTER — VIRTUAL VISIT (OUTPATIENT)
Dept: FAMILY MEDICINE CLINIC | Age: 62
End: 2022-11-03
Payer: MEDICARE

## 2022-11-03 DIAGNOSIS — Z86.73 HX OF COMPLETED STROKE: ICD-10-CM

## 2022-11-03 DIAGNOSIS — I10 ESSENTIAL HYPERTENSION: ICD-10-CM

## 2022-11-03 DIAGNOSIS — E11.9 TYPE 2 DIABETES MELLITUS WITHOUT COMPLICATION, WITHOUT LONG-TERM CURRENT USE OF INSULIN (HCC): Primary | ICD-10-CM

## 2022-11-03 DIAGNOSIS — E78.00 HYPERCHOLESTEROLEMIA: ICD-10-CM

## 2022-11-03 PROCEDURE — 99443 PR PHYS/QHP TELEPHONE EVALUATION 21-30 MIN: CPT | Performed by: FAMILY MEDICINE

## 2022-11-03 NOTE — PROGRESS NOTES
Cherelle Crane is a 58 y.o. female evaluated via telephone on 11/3/2022. Consent:  She and/or health care decision maker is aware that she may receive a bill for this telephone service, depending on her insurance coverage, and has provided verbal consent to proceed: Yes      Documentation:  I communicated with the patient and/or health care decision maker about her diabetes and HTN. Details of this discussion including any medical advice provided:       Subjective:   Cherelle Crane was seen for Diabetes (Follow up.) and Hypertension (Follow up.)      Patient presents with:  Diabetes: Follow up. Hypertension: Follow up. Review of Systems   Eyes:  Negative for blurred vision. Respiratory:  Negative for shortness of breath. Cardiovascular:  Negative for chest pain. Genitourinary:         No polyuria   Neurological:  Negative for dizziness, sensory change, speech change, focal weakness and headaches. Endo/Heme/Allergies:  Negative for polydipsia. Objective:     /80, P 74    Assessment & Plan:   Diagnoses and all orders for this visit:    1. Type 2 diabetes mellitus without complication, without long-term current use of insulin (HCC)  -     METABOLIC PANEL, BASIC; Future  -     HEMOGLOBIN A1C WITH EAG; Future    2. Essential hypertension  -     METABOLIC PANEL, BASIC; Future    3. Hx of completed stroke  -     NMR LIPOPROFILE WITH LIPIDS (WITHOUT GRAPH); Future    4. Hypercholesterolemia  -     HEPATIC FUNCTION PANEL; Future  -     NMR LIPOPROFILE WITH LIPIDS (WITHOUT GRAPH); Future      Blood pressure controlled  Labs per orders. Continue current plans. Follow-up and Dispositions    Return in about 1 month (around 12/3/2022) for diabetic foot exam, AWV - in office. Reviewed plan of care. Patient has provided input and agrees with goals.       I affirm this is a Patient Initiated Episode with an Established Patient who has not had a related appointment within my department in the past 7 days or scheduled within the next 24 hours.     Total Time: minutes: 21-30 minutes    Note: not billable if this call serves to triage the patient into an appointment for the relevant concern      Janeth Davis MD

## 2022-11-03 NOTE — PROGRESS NOTES
Chief Complaint   Patient presents with    Diabetes     Follow up. Hypertension     Follow up. 1. Have you been to the ER, urgent care clinic since your last visit? Hospitalized since your last visit? No    2. Have you seen or consulted any other health care providers outside of the [address] [place] [address] since your last visit? Include any pap smears or colon screening.  No

## 2022-11-03 NOTE — TELEPHONE ENCOUNTER
PER Ms. Dawson-  Please change the fax to [facility] in [place] instead of [facility].      Edvin Guevara

## 2022-12-19 ENCOUNTER — OFFICE VISIT (OUTPATIENT)
Dept: FAMILY MEDICINE CLINIC | Age: 62
End: 2022-12-19
Payer: MEDICARE

## 2022-12-19 VITALS
OXYGEN SATURATION: 97 % | HEART RATE: 94 BPM | RESPIRATION RATE: 20 BRPM | TEMPERATURE: 98 F | DIASTOLIC BLOOD PRESSURE: 74 MMHG | BODY MASS INDEX: 20.73 KG/M2 | WEIGHT: 129 LBS | HEIGHT: 66 IN | SYSTOLIC BLOOD PRESSURE: 133 MMHG

## 2022-12-19 DIAGNOSIS — E78.00 HYPERCHOLESTEROLEMIA: ICD-10-CM

## 2022-12-19 DIAGNOSIS — G81.10: ICD-10-CM

## 2022-12-19 DIAGNOSIS — E11.9 TYPE 2 DIABETES MELLITUS WITHOUT COMPLICATION, WITHOUT LONG-TERM CURRENT USE OF INSULIN (HCC): ICD-10-CM

## 2022-12-19 DIAGNOSIS — Z12.31 ENCOUNTER FOR SCREENING MAMMOGRAM FOR MALIGNANT NEOPLASM OF BREAST: ICD-10-CM

## 2022-12-19 DIAGNOSIS — Z86.73 STATUS POST STROKE: ICD-10-CM

## 2022-12-19 DIAGNOSIS — I10 ESSENTIAL HYPERTENSION: Primary | ICD-10-CM

## 2022-12-19 DIAGNOSIS — I67.9: ICD-10-CM

## 2022-12-19 PROCEDURE — G8427 DOCREV CUR MEDS BY ELIG CLIN: HCPCS | Performed by: FAMILY MEDICINE

## 2022-12-19 PROCEDURE — 3017F COLORECTAL CA SCREEN DOC REV: CPT | Performed by: FAMILY MEDICINE

## 2022-12-19 PROCEDURE — G8754 DIAS BP LESS 90: HCPCS | Performed by: FAMILY MEDICINE

## 2022-12-19 PROCEDURE — 3074F SYST BP LT 130 MM HG: CPT | Performed by: FAMILY MEDICINE

## 2022-12-19 PROCEDURE — G8752 SYS BP LESS 140: HCPCS | Performed by: FAMILY MEDICINE

## 2022-12-19 PROCEDURE — 3078F DIAST BP <80 MM HG: CPT | Performed by: FAMILY MEDICINE

## 2022-12-19 PROCEDURE — G8420 CALC BMI NORM PARAMETERS: HCPCS | Performed by: FAMILY MEDICINE

## 2022-12-19 PROCEDURE — G9899 SCRN MAM PERF RSLTS DOC: HCPCS | Performed by: FAMILY MEDICINE

## 2022-12-19 PROCEDURE — G8510 SCR DEP NEG, NO PLAN REQD: HCPCS | Performed by: FAMILY MEDICINE

## 2022-12-19 PROCEDURE — 2022F DILAT RTA XM EVC RTNOPTHY: CPT | Performed by: FAMILY MEDICINE

## 2022-12-19 PROCEDURE — 99214 OFFICE O/P EST MOD 30 MIN: CPT | Performed by: FAMILY MEDICINE

## 2022-12-19 PROCEDURE — 3044F HG A1C LEVEL LT 7.0%: CPT | Performed by: FAMILY MEDICINE

## 2022-12-19 RX ORDER — AMLODIPINE BESYLATE 10 MG/1
10 TABLET ORAL DAILY
Qty: 90 TABLET | Refills: 4 | Status: SHIPPED | OUTPATIENT
Start: 2022-12-19

## 2022-12-19 RX ORDER — LISINOPRIL 20 MG/1
20 TABLET ORAL DAILY
Qty: 90 TABLET | Refills: 4 | Status: SHIPPED | OUTPATIENT
Start: 2022-12-19

## 2022-12-19 RX ORDER — AMLODIPINE BESYLATE 10 MG/1
10 TABLET ORAL DAILY
Qty: 90 TABLET | Refills: 4 | Status: SHIPPED | OUTPATIENT
Start: 2022-12-19 | End: 2022-12-19 | Stop reason: SDUPTHER

## 2022-12-19 NOTE — PROGRESS NOTES
HISTORY OF PRESENT ILLNESS  Emmy Saavedra is a 58 y.o. female. Patient presents with:  Hypertension: Follow up   Diabetes: Follow up - running in the 100's   Referral Follow Up: [facility]- gait abnormality, pt stroke and brace for left leg           Review of Systems   Eyes:  Negative for blurred vision. Respiratory:  Negative for shortness of breath. Cardiovascular:  Negative for chest pain. Genitourinary:         No polyuria   Neurological:  Positive for focal weakness. Negative for dizziness, sensory change, speech change and headaches. Endo/Heme/Allergies:  Negative for polydipsia. Visit Vitals  /74   Pulse 94   Temp 98 °F (36.7 °C) (Temporal)   Resp 20   Ht 5' 6\" (1.676 m)   Wt 129 lb (58.5 kg)   SpO2 97%   BMI 20.82 kg/m²     Physical Exam  Vitals and nursing note reviewed. Constitutional:       General: She is not in acute distress. Appearance: She is well-developed. She is not diaphoretic. Cardiovascular:      Rate and Rhythm: Normal rate and regular rhythm. Heart sounds: Normal heart sounds. No murmur heard. No friction rub. No gallop. Pulmonary:      Effort: Pulmonary effort is normal. No respiratory distress. Breath sounds: Normal breath sounds. No wheezing or rales. Skin:     General: Skin is warm and dry. Comments: Feet and nails in good condition. Neurological:      Mental Status: She is alert and oriented to person, place, and time. Gait: Gait abnormal.      Comments: Normal monofilament exam.  LLE weakness and stiffness, walks with excessive valgus and the left knee. ASSESSMENT and PLAN    ICD-10-CM ICD-9-CM    1. Essential hypertension  E67 199.0 METABOLIC PANEL, BASIC      METABOLIC PANEL, BASIC      amLODIPine (NORVASC) 10 mg tablet      lisinopriL (PRINIVIL, ZESTRIL) 20 mg tablet      DISCONTINUED: amLODIPine (NORVASC) 10 mg tablet      2.  Type 2 diabetes mellitus without complication, without long-term current use of insulin ([facility]  E11.9 250.00  DIABETES FOOT EXAM      METABOLIC PANEL, BASIC      METABOLIC PANEL, BASIC      lisinopriL (PRINIVIL, ZESTRIL) 20 mg tablet      3. Status post stroke  Z86.73 V12.54 REFERRAL TO PHYSICAL THERAPY      NMR LIPOPROFILE WITH LIPIDS (WITHOUT GRAPH)      NMR LIPOPROFILE WITH LIPIDS (WITHOUT GRAPH)      4. Spastic hemiplegia due to cerebrovascular disease (HCC)  I67.9 437.9 REFERRAL TO PHYSICAL THERAPY    G81.10 342.10       5. Hypercholesterolemia  E78.00 272.0 HEPATIC FUNCTION PANEL      NMR LIPOPROFILE WITH LIPIDS (WITHOUT GRAPH)      HEMOGLOBIN A1C WITH EAG      HEPATIC FUNCTION PANEL      NMR LIPOPROFILE WITH LIPIDS (WITHOUT GRAPH)      HEMOGLOBIN A1C WITH EAG      6. Encounter for screening mammogram for malignant neoplasm of breast  Z12.31 V76.12 [facility] MAMMO BI SCREENING INCL CAD          Blood pressure controlled  Labs per orders. Continue current plans. Refills per orders  PT referral  Mammogram     Follow-up and Dispositions    Return in about 4 months (around 4/19/2023) for diabetes, blood pressure. Reviewed plan of care. Patient has provided input and agrees with goals.

## 2022-12-19 NOTE — PROGRESS NOTES
Chief Complaint   Patient presents with    Hypertension     Follow up     Diabetes     Follow up - running in the 100's     Referral Follow Up     [facility]- gait abnormality, pt stroke and brace for left leg

## 2023-03-02 ENCOUNTER — TELEPHONE (OUTPATIENT)
Dept: FAMILY MEDICINE CLINIC | Age: 63
End: 2023-03-02

## 2023-04-18 LAB
ALBUMIN SERPL-MCNC: 4.7 G/DL (ref 3.8–4.8)
ALP SERPL-CCNC: 116 IU/L (ref 44–121)
ALT SERPL-CCNC: 30 IU/L (ref 0–32)
AST SERPL-CCNC: 24 IU/L (ref 0–40)
BILIRUB DIRECT SERPL-MCNC: <0.1 MG/DL (ref 0–0.4)
BILIRUB SERPL-MCNC: 0.2 MG/DL (ref 0–1.2)
BUN SERPL-MCNC: 11 MG/DL (ref 8–27)
BUN/CREAT SERPL: 14 (ref 12–28)
CALCIUM SERPL-MCNC: 10.1 MG/DL (ref 8.7–10.3)
CHLORIDE SERPL-SCNC: 107 MMOL/L (ref 96–106)
CHOLEST SERPL-MCNC: 192 MG/DL (ref 100–199)
CO2 SERPL-SCNC: 20 MMOL/L (ref 20–29)
CREAT SERPL-MCNC: 0.77 MG/DL (ref 0.57–1)
EGFRCR SERPLBLD CKD-EPI 2021: 87 ML/MIN/1.73
EST. AVERAGE GLUCOSE BLD GHB EST-MCNC: 131 MG/DL
GLUCOSE SERPL-MCNC: 52 MG/DL (ref 70–99)
HBA1C MFR BLD: 6.2 % (ref 4.8–5.6)
HDL SERPL-SCNC: 41.3 UMOL/L
HDLC SERPL-MCNC: 64 MG/DL
IMP & REVIEW OF LAB RESULTS: NORMAL
LDL SERPL QN: 20.9 NM
LDL SERPL-SCNC: 1403 NMOL/L
LDL SMALL SERPL-SCNC: 661 NMOL/L
LDLC SERPL CALC-MCNC: 109 MG/DL (ref 0–99)
LP-IR SCORE SERPL: 30
POTASSIUM SERPL-SCNC: 4 MMOL/L (ref 3.5–5.2)
PROT SERPL-MCNC: 8.4 G/DL (ref 6–8.5)
SODIUM SERPL-SCNC: 144 MMOL/L (ref 134–144)
TRIGL SERPL-MCNC: 108 MG/DL (ref 0–149)

## 2023-04-21 ENCOUNTER — HOSPITAL ENCOUNTER (OUTPATIENT)
Dept: NON INVASIVE DIAGNOSTICS | Age: 63
End: 2023-04-21
Payer: MEDICARE

## 2023-04-21 ENCOUNTER — TELEPHONE (OUTPATIENT)
Dept: FAMILY MEDICINE CLINIC | Age: 63
End: 2023-04-21

## 2023-04-21 ENCOUNTER — VIRTUAL VISIT (OUTPATIENT)
Dept: FAMILY MEDICINE CLINIC | Age: 63
End: 2023-04-21

## 2023-04-21 DIAGNOSIS — E11.9 TYPE 2 DIABETES MELLITUS WITHOUT COMPLICATION, WITHOUT LONG-TERM CURRENT USE OF INSULIN (HCC): Primary | ICD-10-CM

## 2023-04-21 DIAGNOSIS — I10 ESSENTIAL HYPERTENSION: ICD-10-CM

## 2023-04-21 DIAGNOSIS — R60.0 BILATERAL LEG EDEMA: ICD-10-CM

## 2023-04-21 DIAGNOSIS — M25.562 LEFT KNEE PAIN, UNSPECIFIED CHRONICITY: ICD-10-CM

## 2023-04-21 DIAGNOSIS — M25.562 ACUTE PAIN OF LEFT KNEE: Primary | ICD-10-CM

## 2023-04-21 DIAGNOSIS — M25.561 RIGHT KNEE PAIN, UNSPECIFIED CHRONICITY: ICD-10-CM

## 2023-04-21 LAB
ATRIAL RATE: 84 BPM
CALCULATED P AXIS, ECG09: 80 DEGREES
CALCULATED R AXIS, ECG10: -27 DEGREES
CALCULATED T AXIS, ECG11: 13 DEGREES
DIAGNOSIS, 93000: NORMAL
P-R INTERVAL, ECG05: 148 MS
Q-T INTERVAL, ECG07: 376 MS
QRS DURATION, ECG06: 74 MS
QTC CALCULATION (BEZET), ECG08: 444 MS
VENTRICULAR RATE, ECG03: 84 BPM

## 2023-04-21 PROCEDURE — 93005 ELECTROCARDIOGRAM TRACING: CPT

## 2023-04-21 RX ORDER — FUROSEMIDE 20 MG/1
20 TABLET ORAL DAILY
Qty: 30 TABLET | Refills: 0 | Status: SHIPPED | OUTPATIENT
Start: 2023-04-21

## 2023-04-21 NOTE — PROGRESS NOTES
Keyanna Muniz is a 58 y.o. female who was seen by synchronous (real-time) audio-video technology on 4/21/2023. Assessment & Plan:   Diagnoses and all orders for this visit:    1. Type 2 diabetes mellitus without complication, without long-term current use of insulin ([facility] [address]    2. Essential hypertension    3. Bilateral leg edema  -     EKG, 12 LEAD, INITIAL; Future  -     XR CHEST PA LAT; Future  -     furosemide (LASIX) 20 mg tablet; Take 1 Tablet by mouth daily. 4. Right knee pain, unspecified chronicity  -     XR KNEE RT MIN 4 V; Future        Diabetes and blood pressure controlled  Uncertain etiology of edema  Knee giving way, may be osteoarthritis  EKG, CXR, Lasix, elevate legs  Knee x-ray    Follow-up and Dispositions    Return in about 1 month (around 5/21/2023) for edema - in office. Reviewed plan of care. Patient has provided input and agrees with goals. CPT Codes 85141-65362 for Established Patients may apply to this Telehealth Visit      Subjective:   Keyanna Muniz was seen for Diabetes (Follow up - /Blood sugar this morning 138)      Patient presents with:  Diabetes: Follow up -   Blood sugar this morning 138    Her recent A1C was 6.2. She also needs to follow up on her HTN. Also, he right knee gives way when walking. It is painful. This is the side she had the stroke on. Review of Systems   Eyes:  Negative for blurred vision. Respiratory:  Negative for shortness of breath. Cardiovascular:  Positive for leg swelling. Negative for chest pain. Genitourinary:         No polyuria   Neurological:  Positive for focal weakness. Negative for dizziness, sensory change, speech change and headaches. Endo/Heme/Allergies:  Negative for polydipsia. Objective:   /77, P 87    Physical Exam  Constitutional:       General: She is not in acute distress. Appearance: Normal appearance.    Neurological:      Mental Status: She is alert and oriented to person, place, and time. Psychiatric:         Behavior: Behavior normal.       Due to this being a TeleHealth evaluation, many elements of the physical examination are unable to be assessed. We discussed the expected course, resolution and complications of the diagnosis(es) in detail. Medication risks, benefits, costs, interactions, and alternatives were discussed as indicated. I advised her to contact the office if her condition worsens, changes or fails to improve as anticipated. She expressed understanding with the diagnosis(es) and plan. Pursuant to the emergency declaration under the [facility], [facility] waiver authority and the [employer] Act, this Virtual  Visit was conducted, with patient's consent, to reduce the patient's risk of exposure to COVID-19 and provide continuity of care for an established patient. Services were provided through a video synchronous discussion virtually to substitute for in-person clinic visit.     Brittanie Horton MD

## 2023-05-13 DIAGNOSIS — R60.0 LOCALIZED EDEMA: ICD-10-CM

## 2023-05-15 ENCOUNTER — TRANSCRIBE ORDERS (OUTPATIENT)
Facility: HOSPITAL | Age: 63
End: 2023-05-15

## 2023-05-15 DIAGNOSIS — Z12.31 SCREENING MAMMOGRAM FOR HIGH-RISK PATIENT: Primary | ICD-10-CM

## 2023-05-17 RX ORDER — FUROSEMIDE 20 MG/1
TABLET ORAL
Qty: 90 TABLET | Refills: 3 | Status: SHIPPED | OUTPATIENT
Start: 2023-05-17

## 2023-05-18 RX ORDER — GLIPIZIDE 5 MG/1
2 TABLET ORAL
COMMUNITY
Start: 2022-10-11

## 2023-05-18 RX ORDER — SERTRALINE HYDROCHLORIDE 100 MG/1
150 TABLET, FILM COATED ORAL DAILY
COMMUNITY
Start: 2017-12-11

## 2023-05-18 RX ORDER — DULAGLUTIDE 3 MG/.5ML
3 INJECTION, SOLUTION SUBCUTANEOUS
COMMUNITY
Start: 2022-10-18 | End: 2023-06-23 | Stop reason: SDUPTHER

## 2023-05-18 RX ORDER — ATORVASTATIN CALCIUM 20 MG/1
1 TABLET, FILM COATED ORAL NIGHTLY
COMMUNITY
Start: 2022-01-02

## 2023-05-18 RX ORDER — FUROSEMIDE 20 MG/1
20 TABLET ORAL DAILY
COMMUNITY
Start: 2023-04-21

## 2023-05-18 RX ORDER — LISINOPRIL 20 MG/1
20 TABLET ORAL DAILY
COMMUNITY
Start: 2022-12-19

## 2023-05-18 RX ORDER — METFORMIN HYDROCHLORIDE 500 MG/1
500 TABLET, EXTENDED RELEASE ORAL
COMMUNITY
Start: 2018-05-27 | End: 2023-07-11

## 2023-05-18 RX ORDER — ASCORBIC ACID 500 MG
500 TABLET ORAL 2 TIMES DAILY
COMMUNITY
Start: 2021-08-24

## 2023-05-18 RX ORDER — CLOPIDOGREL BISULFATE 75 MG/1
1 TABLET ORAL DAILY
COMMUNITY
Start: 2022-07-04 | End: 2023-07-08

## 2023-05-18 RX ORDER — AMLODIPINE BESYLATE 10 MG/1
10 TABLET ORAL DAILY
COMMUNITY
Start: 2022-12-19

## 2023-05-22 ENCOUNTER — HOSPITAL ENCOUNTER (OUTPATIENT)
Facility: HOSPITAL | Age: 63
Discharge: HOME | End: 2023-05-25
Payer: MEDICARE

## 2023-05-22 DIAGNOSIS — Z12.31 SCREENING MAMMOGRAM FOR HIGH-RISK PATIENT: ICD-10-CM

## 2023-05-22 DIAGNOSIS — M25.562 LEFT KNEE PAIN, UNSPECIFIED CHRONICITY: ICD-10-CM

## 2023-05-22 PROCEDURE — 77067 SCR MAMMO BI INCL CAD: CPT

## 2023-05-22 PROCEDURE — 73562 X-RAY EXAM OF KNEE 3: CPT

## 2023-06-22 ENCOUNTER — TELEPHONE (OUTPATIENT)
Facility: CLINIC | Age: 63
End: 2023-06-22

## 2023-06-22 NOTE — TELEPHONE ENCOUNTER
----- Message from Sigifredo Flory sent at 6/21/2023  4:37 PM EDT -----  Subject: Refill Request    QUESTIONS  Name of Medication? Dulaglutide (TRULICITY) 3 YT/5.6IW SOPN  Patient-reported dosage and instructions? 3 MG/0.5ML SOPN, once a week   How many days do you have left? 14  [id number]  Pharmacy phone number (if available)? [phone]  Additional Information for Provider? pt unsure of how many weeks   remaining.  ---------------------------------------------------------------------------  --------------  CALL BACK INFO  What is the best way for the office to contact you? OK to leave message on   voicemail  Preferred Call Back Phone Number? [phone]  ---------------------------------------------------------------------------  --------------  SCRIPT ANSWERS  Relationship to Patient?  Self

## 2023-06-22 NOTE — TELEPHONE ENCOUNTER
----- Message from Chiquita Sharpe sent at 6/21/2023  4:34 PM EDT -----  Subject: Appointment Request    Reason for Call: Established Patient Appointment needed: Routine Existing   Condition Follow Up (Diabetes)    QUESTIONS    Reason for appointment request? Other - error during search stating   \"Sorry, the appointment cannot be booked with the [facility] ([facility]) for this provider     Additional Information for Provider? Pt called in trying to schedule an   appointment w/ Dr. Kathie Garcia for DM Follow Up. Unable to schedule - I   received an error during search stating \"Sorry, the appointment cannot be   booked with the [facility] ([facility]) for this provider. Please send a message to the   provider. \" Please follow up to schedule.  ---------------------------------------------------------------------------  --------------  Radha WILLIAM  [phone]; OK to leave message on voicemail  ---------------------------------------------------------------------------  --------------  SCRIPT ANSWERS

## 2023-06-22 NOTE — TELEPHONE ENCOUNTER
----- Message from Michaela Sheffield sent at 6/21/2023  4:34 PM EDT -----  Subject: Appointment Request    Reason for Call: Established Patient Appointment needed: Routine Existing   Condition Follow Up (Diabetes)    QUESTIONS    Reason for appointment request? Other - error during search stating   \"Sorry, the appointment cannot be booked with the [facility] ([facility]) for this provider     Additional Information for Provider? Pt called in trying to schedule an   appointment w/ Dr. Laya Simon for DM Follow Up. Unable to schedule - I   received an error during search stating \"Sorry, the appointment cannot be   booked with the [facility] ([facility]) for this provider. Please send a message to the   provider. \" Please follow up to schedule.  ---------------------------------------------------------------------------  --------------  Karl WILLIAM  [phone]; OK to leave message on voicemail  ---------------------------------------------------------------------------  --------------  SCRIPT ANSWERS

## 2023-06-23 RX ORDER — DULAGLUTIDE 3 MG/.5ML
3 INJECTION, SOLUTION SUBCUTANEOUS
Qty: 12 ADJUSTABLE DOSE PRE-FILLED PEN SYRINGE | Refills: 3 | Status: SHIPPED | OUTPATIENT
Start: 2023-06-23

## 2023-07-08 RX ORDER — CLOPIDOGREL BISULFATE 75 MG/1
TABLET ORAL
Qty: 90 TABLET | Refills: 3 | Status: SHIPPED | OUTPATIENT
Start: 2023-07-08

## 2023-09-06 ENCOUNTER — OFFICE VISIT (OUTPATIENT)
Facility: CLINIC | Age: 63
End: 2023-09-06
Payer: MEDICARE

## 2023-09-06 VITALS
HEART RATE: 109 BPM | HEIGHT: 66 IN | BODY MASS INDEX: 19.77 KG/M2 | SYSTOLIC BLOOD PRESSURE: 109 MMHG | OXYGEN SATURATION: 99 % | WEIGHT: 123 LBS | RESPIRATION RATE: 16 BRPM | TEMPERATURE: 97.2 F | DIASTOLIC BLOOD PRESSURE: 66 MMHG

## 2023-09-06 DIAGNOSIS — B35.1 ONYCHOMYCOSIS: ICD-10-CM

## 2023-09-06 DIAGNOSIS — E11.9 TYPE 2 DIABETES MELLITUS WITHOUT COMPLICATION, WITHOUT LONG-TERM CURRENT USE OF INSULIN (HCC): Primary | ICD-10-CM

## 2023-09-06 DIAGNOSIS — I10 ESSENTIAL HYPERTENSION: ICD-10-CM

## 2023-09-06 DIAGNOSIS — Z12.4 SCREENING FOR CERVICAL CANCER: ICD-10-CM

## 2023-09-06 PROCEDURE — 3044F HG A1C LEVEL LT 7.0%: CPT | Performed by: FAMILY MEDICINE

## 2023-09-06 PROCEDURE — 3017F COLORECTAL CA SCREEN DOC REV: CPT | Performed by: FAMILY MEDICINE

## 2023-09-06 PROCEDURE — G8420 CALC BMI NORM PARAMETERS: HCPCS | Performed by: FAMILY MEDICINE

## 2023-09-06 PROCEDURE — 3078F DIAST BP <80 MM HG: CPT | Performed by: FAMILY MEDICINE

## 2023-09-06 PROCEDURE — 1036F TOBACCO NON-USER: CPT | Performed by: FAMILY MEDICINE

## 2023-09-06 PROCEDURE — 2022F DILAT RTA XM EVC RTNOPTHY: CPT | Performed by: FAMILY MEDICINE

## 2023-09-06 PROCEDURE — G8427 DOCREV CUR MEDS BY ELIG CLIN: HCPCS | Performed by: FAMILY MEDICINE

## 2023-09-06 PROCEDURE — 3074F SYST BP LT 130 MM HG: CPT | Performed by: FAMILY MEDICINE

## 2023-09-06 PROCEDURE — 99214 OFFICE O/P EST MOD 30 MIN: CPT | Performed by: FAMILY MEDICINE

## 2023-09-06 RX ORDER — LISINOPRIL 20 MG/1
20 TABLET ORAL DAILY
Qty: 90 TABLET | Refills: 4 | Status: SHIPPED | OUTPATIENT
Start: 2023-09-06

## 2023-09-06 RX ORDER — AMLODIPINE BESYLATE 10 MG/1
10 TABLET ORAL DAILY
Qty: 90 TABLET | Refills: 4 | Status: SHIPPED | OUTPATIENT
Start: 2023-09-06

## 2023-09-06 ASSESSMENT — ENCOUNTER SYMPTOMS: SHORTNESS OF BREATH: 0

## 2023-10-11 ENCOUNTER — TELEPHONE (OUTPATIENT)
Facility: CLINIC | Age: 63
End: 2023-10-11

## 2023-10-11 NOTE — TELEPHONE ENCOUNTER
Pt calling to say that she went to [facility] and they Dx'd her with having Pneumonia. She was just calling to let us know.      Uceh Grover

## 2023-11-10 RX ORDER — CLOPIDOGREL BISULFATE 75 MG/1
75 TABLET ORAL DAILY
Qty: 90 TABLET | Refills: 3 | Status: SHIPPED | OUTPATIENT
Start: 2023-11-10

## 2023-11-10 RX ORDER — GLIPIZIDE 5 MG/1
10 TABLET ORAL
Qty: 180 TABLET | Refills: 0 | Status: SHIPPED | OUTPATIENT
Start: 2023-11-10

## 2023-11-13 RX ORDER — GLIPIZIDE 5 MG/1
TABLET ORAL
Qty: 180 TABLET | Refills: 0 | OUTPATIENT
Start: 2023-11-13

## 2023-11-17 DIAGNOSIS — E11.9 TYPE 2 DIABETES MELLITUS WITHOUT COMPLICATIONS (HCC): ICD-10-CM

## 2023-11-17 RX ORDER — DULAGLUTIDE 3 MG/.5ML
3 INJECTION, SOLUTION SUBCUTANEOUS WEEKLY
Qty: 2 ML | Refills: 7 | Status: SHIPPED | OUTPATIENT
Start: 2023-11-17

## 2023-11-29 RX ORDER — GLIPIZIDE 5 MG/1
TABLET ORAL
Qty: 180 TABLET | Refills: 3 | Status: SHIPPED | OUTPATIENT
Start: 2023-11-29

## 2024-01-24 ENCOUNTER — TELEPHONE (OUTPATIENT)
Facility: CLINIC | Age: 64
End: 2024-01-24

## 2024-01-24 NOTE — TELEPHONE ENCOUNTER
Called the pharmacy to check on status of medication and they said patient has multiple refills left. They will contact her to let her know she is ok to  a refill today.

## 2024-01-24 NOTE — TELEPHONE ENCOUNTER
Pt is requesting a refill on     TRULICITY 3 mg/0.5 ml     Please send to [facility]

## 2024-02-05 DIAGNOSIS — R60.0 LOCALIZED EDEMA: ICD-10-CM

## 2024-02-05 RX ORDER — FUROSEMIDE 20 MG/1
TABLET ORAL
Qty: 90 TABLET | Refills: 3 | OUTPATIENT
Start: 2024-02-05

## 2024-02-13 ENCOUNTER — TELEPHONE (OUTPATIENT)
Facility: CLINIC | Age: 64
End: 2024-02-13

## 2024-02-14 LAB
BUN SERPL-MCNC: 19 MG/DL (ref 8–27)
BUN/CREAT SERPL: 27 (ref 12–28)
CALCIUM SERPL-MCNC: 9.9 MG/DL (ref 8.7–10.3)
CHLORIDE SERPL-SCNC: 103 MMOL/L (ref 96–106)
CO2 SERPL-SCNC: 25 MMOL/L (ref 20–29)
CREAT SERPL-MCNC: 0.71 MG/DL (ref 0.57–1)
EGFRCR SERPLBLD CKD-EPI 2021: 95 ML/MIN/1.73
GLUCOSE SERPL-MCNC: 81 MG/DL (ref 70–99)
HBA1C MFR BLD: 6.3 % (ref 4.8–5.6)
POTASSIUM SERPL-SCNC: 3.5 MMOL/L (ref 3.5–5.2)
SODIUM SERPL-SCNC: 143 MMOL/L (ref 134–144)

## 2024-02-15 LAB
ALBUMIN/CREAT UR: 8 MG/G CREAT (ref 0–29)
CREAT UR-MCNC: 161.5 MG/DL
MICROALBUMIN UR-MCNC: 12.8 UG/ML

## 2024-03-08 ENCOUNTER — TELEPHONE (OUTPATIENT)
Facility: CLINIC | Age: 64
End: 2024-03-08

## 2024-03-08 NOTE — TELEPHONE ENCOUNTER
Pt requesting call back with results of Hgb A1c done 2/13/24 at [phone].    Pt scheduled for 8/5/24 to establish care with Dr. Singh but was not aware Dr. Parikh has officially retired. Olimpia

## 2024-03-12 ENCOUNTER — TELEPHONE (OUTPATIENT)
Dept: PHARMACY | Facility: CLINIC | Age: 64
End: 2024-03-12

## 2024-03-12 NOTE — TELEPHONE ENCOUNTER
[facility] CLINICAL PHARMACY: ADHERENCE REVIEW  Identified care gap per [place]: fills at [facility]: Diabetes adherence    Patient also appears to be prescribed: ACE/ARB    ASSESSMENT    ACE/ARB ADHERENCE    Insurance Records claims through  24  (Prior Year PDC = 100% - PASSED; YTD PDC = FIRST FILL; Potential Fail Date: n/a):   LISINOPRIL TAB 20 MG last filled on 23 for 90 day supply. Next refill due: 03.15.24    Prescribed si tablet/capsule daily    Per Insurer Portal: last filled on 23 for 90 day supply.     Per [facility]: last picked up on 23 for 90 day supply. Billed through [employer]. 3 refills remaining.    BP Readings from Last 3 Encounters:   23 109/66   22 133/74   21 133/71     CrCl cannot be calculated (Unknown ideal weight.).  Lab Results   Component Value Date    CREATININE 0.71 2024     Lab Results   Component Value Date    K 3.5 2024     DIABETES ADHERENCE    Insurance Records claims through  23  (Prior Year PDC = not reported; YTD PDC = FIRST FILL; Potential Fail Date: 24):   TRULICITY INJ 3/0.5 ML last filled on 24 for 28 day supply. Next refill due: 24 -PAST DUE 20 DAYS     Prescribed sig:  Inject 3 mg into the skin once a week    Per Insurer Portal: last filled on 24 for 28 day supply.     Per [facility]: last picked up on 24 for 28 day supply. Billed through [employer]. 7 refills remaining.    Lab Results   Component Value Date    LABA1C 6.3 (H) 2024    LABA1C 6.2 (H) 2023    LABA1C 6.8 (H) 2022       PLAN    Per insurer report, LIS-3 - co-pays are $0.00 through all phases of the benefit, regardless of the days' supply dispensed    The following are interventions that have been identified:   Patient overdue refilling TRULICITY INJ 3/0.5 ML and active on home medication list.   Per [facility], TRULICITY INJ 3/0.5 ML is still on backorder.    Attempting to reach patient to review - unable to leave

## 2024-03-17 DIAGNOSIS — R60.0 LOCALIZED EDEMA: ICD-10-CM

## 2024-03-18 RX ORDER — FUROSEMIDE 20 MG/1
TABLET ORAL
Qty: 90 TABLET | Refills: 3 | OUTPATIENT
Start: 2024-03-18

## 2024-03-27 DIAGNOSIS — R60.0 LOCALIZED EDEMA: ICD-10-CM

## 2024-03-28 RX ORDER — FUROSEMIDE 20 MG/1
TABLET ORAL
Qty: 90 TABLET | Refills: 3 | OUTPATIENT
Start: 2024-03-28

## 2024-04-09 DIAGNOSIS — R60.0 LOCALIZED EDEMA: ICD-10-CM

## 2024-04-09 NOTE — TELEPHONE ENCOUNTER
Faxed refill request:     Furosemide 20 mg tablet   Qty:90   Refills:3    (Pt has a NP appt scheduled with [facility] in August)     [facility].

## 2024-04-10 RX ORDER — FUROSEMIDE 20 MG/1
20 TABLET ORAL DAILY
Qty: 90 TABLET | Refills: 1 | Status: SHIPPED | OUTPATIENT
Start: 2024-04-10

## 2024-04-11 ENCOUNTER — TELEPHONE (OUTPATIENT)
Dept: PHARMACY | Facility: CLINIC | Age: 64
End: 2024-04-11

## 2024-04-11 NOTE — TELEPHONE ENCOUNTER
[facility] CLINICAL PHARMACY: ADHERENCE REVIEW  Identified care gap per [place] fills with [facility]: Diabetes adherence    Medicare and [facility] Dual Special Needs Plan - MRDSNP  MA-PCPi  Per insurer report, LIS-3 - co-pays are $0.00 through all phases of the benefit, regardless of the days' supply dispensed.  Patient also appears to be prescribed: ACE/ARB    ASSESSMENT    ACE/ARB ADHERENCE    Insurance Records claims through  24  (Prior Year PDC = 100% - PASSED ; YTD PDC = FIRST FILL; Potential Fail Date: 24):   LISINOPRIL TAB 20 MG last filled on 24 for 90 day supply. Next refill due: 06.15.24    Prescribed si tablet/capsule daily    Per Reconcile Dispense History and Insurer Portal: last filled on 24 for 90 day supply.     Per [facility]: last picked up on 24 for 90 day supply. Billed through [facility]. 2 refills remaining.    BP Readings from Last 3 Encounters:   23 109/66   22 133/74   21 133/71     CrCl cannot be calculated (Unknown ideal weight.).  Lab Results   Component Value Date    CREATININE 0.71 2024     Lab Results   Component Value Date    K 3.5 2024     DIABETES ADHERENCE    Insurance Records claims through  24  (Prior Year PDC = not reported; YTD PDC = 67%; Potential Fail Date: 24):   TRULICITY INJ 3/0.5 ML last filled on 24 for 28 day supply. Next refill due: 24    Prescribed sig:  : Inject 3 mg into the skin once a week    Per Reconcile Dispense History and Insurer Portal: last filled on 24 for 84 day supply.     Per [facility]: Pharmacy was closed for lunch.    Lab Results   Component Value Date    LABA1C 6.3 (H) 2024    LABA1C 6.2 (H) 2023    LABA1C 6.8 (H) 2022         PLAN  The following are interventions that have been identified:   Patient needs future refills for TRULICITY INJ 3/0.5 ML.     Outreach:    Pharmacy:  [facility]  Pharmacy verified TRULICITY INJ 3/0.5 ML is

## 2024-04-30 ENCOUNTER — TRANSCRIBE ORDERS (OUTPATIENT)
Facility: HOSPITAL | Age: 64
End: 2024-04-30

## 2024-04-30 DIAGNOSIS — Z12.31 SCREENING MAMMOGRAM FOR HIGH-RISK PATIENT: Primary | ICD-10-CM

## 2024-05-06 DIAGNOSIS — I10 ESSENTIAL HYPERTENSION: ICD-10-CM

## 2024-05-06 RX ORDER — AMLODIPINE BESYLATE 10 MG/1
10 TABLET ORAL DAILY
Qty: 90 TABLET | Refills: 0 | Status: SHIPPED | OUTPATIENT
Start: 2024-05-06

## 2024-05-06 NOTE — TELEPHONE ENCOUNTER
New pt appt 08/06/24    [id number]    AMLODIPINE BESYLATE 10 MG TAB    QTY 90    Refills 4    Take 1 tablet by mouth everyday

## 2024-05-23 ENCOUNTER — HOSPITAL ENCOUNTER (OUTPATIENT)
Facility: HOSPITAL | Age: 64
Discharge: HOME | End: 2024-05-23
Attending: FAMILY MEDICINE
Payer: MEDICARE

## 2024-05-23 VITALS — BODY MASS INDEX: 19.77 KG/M2 | HEIGHT: 66 IN | WEIGHT: 123 LBS

## 2024-05-23 DIAGNOSIS — Z12.31 SCREENING MAMMOGRAM FOR HIGH-RISK PATIENT: ICD-10-CM

## 2024-05-23 PROCEDURE — 77067 SCR MAMMO BI INCL CAD: CPT

## 2024-06-11 ENCOUNTER — ENROLLMENT (OUTPATIENT)
Dept: PHARMACY | Facility: CLINIC | Age: 64
End: 2024-06-11

## 2024-07-10 DIAGNOSIS — E11.9 TYPE 2 DIABETES MELLITUS WITHOUT COMPLICATIONS (HCC): ICD-10-CM

## 2024-07-10 RX ORDER — DULAGLUTIDE 3 MG/.5ML
3 INJECTION, SOLUTION SUBCUTANEOUS WEEKLY
Qty: 2 ML | Refills: 7 | Status: SHIPPED | OUTPATIENT
Start: 2024-07-10

## 2024-07-10 NOTE — TELEPHONE ENCOUNTER
NP appointment 08/06/24    [facility]  #[id number]  [place]    TRULICITY 3MG/0.5 ML PEN    Qty 6.0 ML SIX    Inject 3 mg into the skin every 7 days

## 2024-08-13 DIAGNOSIS — R60.0 LOCALIZED EDEMA: ICD-10-CM

## 2024-08-14 RX ORDER — FUROSEMIDE 20 MG
20 TABLET ORAL DAILY
Qty: 90 TABLET | Refills: 1 | OUTPATIENT
Start: 2024-08-14

## 2024-08-29 DIAGNOSIS — R60.0 LOCALIZED EDEMA: ICD-10-CM

## 2024-08-29 RX ORDER — FUROSEMIDE 20 MG/1
20 TABLET ORAL DAILY
Qty: 90 TABLET | Refills: 1 | OUTPATIENT
Start: 2024-08-29

## 2024-08-30 RX ORDER — FUROSEMIDE 20 MG/1
20 TABLET ORAL DAILY
Qty: 90 TABLET | Refills: 1 | OUTPATIENT
Start: 2024-08-30

## 2024-09-03 RX ORDER — CLOPIDOGREL BISULFATE 75 MG/1
75 TABLET ORAL DAILY
Qty: 90 TABLET | Refills: 3 | OUTPATIENT
Start: 2024-09-03

## 2024-09-13 ENCOUNTER — TELEPHONE (OUTPATIENT)
Facility: CLINIC | Age: 64
End: 2024-09-13

## 2024-09-17 ENCOUNTER — TELEPHONE (OUTPATIENT)
Facility: CLINIC | Age: 64
End: 2024-09-17

## 2024-09-22 DIAGNOSIS — R60.0 LOCALIZED EDEMA: ICD-10-CM

## 2024-09-23 ENCOUNTER — TELEPHONE (OUTPATIENT)
Facility: CLINIC | Age: 64
End: 2024-09-23

## 2024-09-23 ENCOUNTER — TELEPHONE (OUTPATIENT)
Dept: PHARMACY | Facility: CLINIC | Age: 64
End: 2024-09-23

## 2024-09-23 RX ORDER — CLOPIDOGREL BISULFATE 75 MG/1
75 TABLET ORAL DAILY
Qty: 90 TABLET | Refills: 3 | OUTPATIENT
Start: 2024-09-23

## 2024-09-23 RX ORDER — FUROSEMIDE 20 MG/1
20 TABLET ORAL DAILY
Qty: 90 TABLET | Refills: 1 | OUTPATIENT
Start: 2024-09-23

## 2024-09-25 DIAGNOSIS — I10 ESSENTIAL HYPERTENSION: ICD-10-CM

## 2024-09-25 RX ORDER — LISINOPRIL 20 MG/1
20 TABLET ORAL DAILY
Qty: 90 TABLET | Refills: 4 | Status: SHIPPED | OUTPATIENT
Start: 2024-09-25

## 2024-10-03 ENCOUNTER — OFFICE VISIT (OUTPATIENT)
Facility: CLINIC | Age: 64
End: 2024-10-03
Payer: MEDICARE

## 2024-10-03 VITALS
DIASTOLIC BLOOD PRESSURE: 70 MMHG | HEIGHT: 66 IN | OXYGEN SATURATION: 96 % | BODY MASS INDEX: 19.85 KG/M2 | HEART RATE: 59 BPM | SYSTOLIC BLOOD PRESSURE: 118 MMHG

## 2024-10-03 DIAGNOSIS — F01.50 VASCULAR DEMENTIA WITHOUT BEHAVIORAL DISTURBANCE (HCC): ICD-10-CM

## 2024-10-03 DIAGNOSIS — R53.1 LEFT-SIDED WEAKNESS: ICD-10-CM

## 2024-10-03 DIAGNOSIS — Z00.00 MEDICARE ANNUAL WELLNESS VISIT, SUBSEQUENT: Primary | ICD-10-CM

## 2024-10-03 DIAGNOSIS — R60.0 LOCALIZED EDEMA: ICD-10-CM

## 2024-10-03 DIAGNOSIS — I10 ESSENTIAL (PRIMARY) HYPERTENSION: ICD-10-CM

## 2024-10-03 DIAGNOSIS — E11.9 TYPE 2 DIABETES MELLITUS WITHOUT COMPLICATION, WITHOUT LONG-TERM CURRENT USE OF INSULIN (HCC): ICD-10-CM

## 2024-10-03 DIAGNOSIS — J06.9 ACUTE URI: ICD-10-CM

## 2024-10-03 PROCEDURE — G0439 PPPS, SUBSEQ VISIT: HCPCS

## 2024-10-03 PROCEDURE — 99214 OFFICE O/P EST MOD 30 MIN: CPT

## 2024-10-03 PROCEDURE — 3074F SYST BP LT 130 MM HG: CPT

## 2024-10-03 PROCEDURE — G8484 FLU IMMUNIZE NO ADMIN: HCPCS

## 2024-10-03 PROCEDURE — G8427 DOCREV CUR MEDS BY ELIG CLIN: HCPCS

## 2024-10-03 PROCEDURE — G8420 CALC BMI NORM PARAMETERS: HCPCS

## 2024-10-03 PROCEDURE — 3044F HG A1C LEVEL LT 7.0%: CPT

## 2024-10-03 PROCEDURE — 2022F DILAT RTA XM EVC RTNOPTHY: CPT

## 2024-10-03 PROCEDURE — 3017F COLORECTAL CA SCREEN DOC REV: CPT

## 2024-10-03 PROCEDURE — 1036F TOBACCO NON-USER: CPT

## 2024-10-03 PROCEDURE — 3078F DIAST BP <80 MM HG: CPT

## 2024-10-03 RX ORDER — DULAGLUTIDE 3 MG/.5ML
3 INJECTION, SOLUTION SUBCUTANEOUS WEEKLY
Qty: 2 ML | Refills: 7 | Status: SHIPPED | OUTPATIENT
Start: 2024-10-03

## 2024-10-03 RX ORDER — AMLODIPINE BESYLATE 5 MG/1
5 TABLET ORAL DAILY
Qty: 90 TABLET | Refills: 0 | Status: SHIPPED | OUTPATIENT
Start: 2024-10-03

## 2024-10-03 RX ORDER — FUROSEMIDE 20 MG
20 TABLET ORAL DAILY
Qty: 90 TABLET | Refills: 1 | Status: SHIPPED | OUTPATIENT
Start: 2024-10-03

## 2024-10-03 SDOH — ECONOMIC STABILITY: INCOME INSECURITY: HOW HARD IS IT FOR YOU TO PAY FOR THE VERY BASICS LIKE FOOD, HOUSING, MEDICAL CARE, AND HEATING?: NOT HARD AT ALL

## 2024-10-03 SDOH — ECONOMIC STABILITY: FOOD INSECURITY: WITHIN THE PAST 12 MONTHS, YOU WORRIED THAT YOUR FOOD WOULD RUN OUT BEFORE YOU GOT MONEY TO BUY MORE.: NEVER TRUE

## 2024-10-03 SDOH — ECONOMIC STABILITY: FOOD INSECURITY: WITHIN THE PAST 12 MONTHS, THE FOOD YOU BOUGHT JUST DIDN'T LAST AND YOU DIDN'T HAVE MONEY TO GET MORE.: NEVER TRUE

## 2024-10-03 ASSESSMENT — PATIENT HEALTH QUESTIONNAIRE - PHQ9
SUM OF ALL RESPONSES TO PHQ9 QUESTIONS 1 & 2: 0
SUM OF ALL RESPONSES TO PHQ QUESTIONS 1-9: 0
SUM OF ALL RESPONSES TO PHQ QUESTIONS 1-9: 0
1. LITTLE INTEREST OR PLEASURE IN DOING THINGS: NOT AT ALL
1. LITTLE INTEREST OR PLEASURE IN DOING THINGS: NOT AT ALL
SUM OF ALL RESPONSES TO PHQ QUESTIONS 1-9: 0
SUM OF ALL RESPONSES TO PHQ QUESTIONS 1-9: 0
2. FEELING DOWN, DEPRESSED OR HOPELESS: NOT AT ALL
SUM OF ALL RESPONSES TO PHQ QUESTIONS 1-9: 0
SUM OF ALL RESPONSES TO PHQ QUESTIONS 1-9: 0
2. FEELING DOWN, DEPRESSED OR HOPELESS: NOT AT ALL
SUM OF ALL RESPONSES TO PHQ QUESTIONS 1-9: 0
SUM OF ALL RESPONSES TO PHQ9 QUESTIONS 1 & 2: 0
SUM OF ALL RESPONSES TO PHQ QUESTIONS 1-9: 0

## 2024-10-03 ASSESSMENT — LIFESTYLE VARIABLES
HOW OFTEN DO YOU HAVE A DRINK CONTAINING ALCOHOL: NEVER
HOW MANY STANDARD DRINKS CONTAINING ALCOHOL DO YOU HAVE ON A TYPICAL DAY: PATIENT DOES NOT DRINK

## 2024-10-03 NOTE — ASSESSMENT & PLAN NOTE
Orders:    Dulaglutide (TRULICITY) 3 MG/0.5ML SOPN; Inject 3 mg into the skin once a week    Hemoglobin A1C [LAB90]; Future    Lipid Panel; Future

## 2024-10-03 NOTE — PROGRESS NOTES
Chief Complaint   Patient presents with    New Patient     Establish new PCP.  Right leg popping when walking    Congestion     Cold like symptoms     \"Have you been to the ER, urgent care clinic since your last visit?  Hospitalized since your last visit?\"    NO    “Have you seen or consulted any other health care providers [facility] of [facility] since your last visit?”    NO     “Have you had a pap smear?”    NO    Date of last Cervical Cancer screen (HPV or PAP): 5/13/2016             Click Here for Release of Records Request    
Medicare Annual Wellness Visit    Sohail Peña is here for New Patient (Establish new PCP./Right leg popping when walking), Congestion (Cold like symptoms), and Medicare AWV    Assessment & Plan   Left-sided weakness  Discussed with patient soft left knee brace to help with any instability or kneecap popping.  Will refer to PT to work on strengthening of upper and lower extremities after stroke  -     External Referral To Physical Therapy    Localized edema  Last BMP collected to 2/13/2024 reviewed with patient.  Refill provided.  -     furosemide (LASIX) 20 MG tablet; Take 1 tablet by mouth daily, Disp-90 tablet, R-1Normal    Type 2 diabetes mellitus without complication, without long-term current use of insulin ([facility])  Last hemoglobin A1c 6.4 on 2/13/2024.  Will recheck hemoglobin A1c today.  Foot exam completed today was abnormal, decree sensation on left foot.  Referred to podiatry.  Also referred to ophthalmology for diabetic eye check.  Medication refill provided and lipid panel ordered  -     Dulaglutide (TRULICITY) 3 MG/0.5ML SOPN; Inject 3 mg into the skin once a week, Disp-2 mL, R-7Normal  -     Hemoglobin A1C [LAB90]; Future  -     Lipid Panel; Future  -     Amb External Referral To Ophthalmology  -     External Referral To Podiatry  -      DIABETES FOOT EXAM      Medicare annual wellness visit, subsequent  Vascular dementia without behavioral disturbance (HCC)  Medicare wellness visit completed today.  Please see below.    Essential (primary) hypertension  Well-controlled.  Patient currently on lisinopril 20 mg daily and amlodipine 10 mg daily.  Lisinopril has been a long-term medication.  Patient and daughter interested on slowly weaning off medication if she is able to.  Will decrease amlodipine from 10 mg daily to 5 mg daily.  Follow-up in 4 weeks  -     amLODIPine (NORVASC) 5 MG tablet; Take 1 tablet by mouth daily, Disp-90 tablet, R-0Normal    Acute URI  Discussed supportive care and increase 
02/13/2024 9.9  8.7 - 10.3 mg/dL Final    Hemoglobin A1C 02/13/2024 6.3 (H)  4.8 - 5.6 % Final    Comment:             Prediabetes: 5.7 - 6.4           Diabetes: >6.4           Glycemic control for adults with diabetes: <7.0      Creatinine, Ur 02/13/2024 161.5  Not Estab. mg/dL Final    Albumin, U 02/13/2024 12.8  Not Estab. ug/mL Final    Microalb/Creat Ratio 02/13/2024 8  0 - 29 mg/g creat Final    Comment:                        Normal:                0 -  29                         Moderately increased: 30 - 300                         Severely increased:       >300         OBJECTIVE  There were no vitals taken for this visit.    Physical Exam      ASSESSMENT & PLAN    Assessment & Plan  Localized edema   {A/P Summary:9339119459}    Orders:    furosemide (LASIX) 20 MG tablet; Take 1 tablet by mouth daily    Type 2 diabetes mellitus without complication, without long-term current use of insulin (HCC)   {A/P Summary:7643227221}    Orders:    Dulaglutide (TRULICITY) 3 MG/0.5ML SOPN; Inject 3 mg into the skin once a week    Hemoglobin A1C [LAB90]; Future    Lipid Panel; Future    Vascular dementia without behavioral disturbance (HCC)   {A/P Summary:6576945125}         Left-sided weakness   {A/P Summary:2160784057}    Orders:    External Referral To Physical Therapy        No follow-ups on file.    There are no Patient Instructions on file for this visit.                Portions of this note may have been populated using smart dictation software and may have \"sounds-like\" errors present.     Pt was counseled on risks, benefits and alternatives of treatment options. All questions were asked and answered and the patient was agreeable with the treatment plan as outlined.

## 2024-10-04 ENCOUNTER — CLINICAL DOCUMENTATION (OUTPATIENT)
Facility: CLINIC | Age: 64
End: 2024-10-04

## 2024-10-04 NOTE — PROGRESS NOTES
Pt called with her daughter Savanna on the line to update primary contact to Savanna and to have her second daughter Myrna listed as secondary contact.  Contacts updated and cell phone number for Savanna added per request.     Savanna also agrees to contact podiatrist referred to by Dr. Anderson to update this information as she will be calling to schedule pt's appointment.  Pt also requests lab orders be faxed to [facility] in [place] ([facility]) to have drawn tomorrow.     Orders faxed per request. Olimpia

## 2024-10-08 LAB
CHOLEST SERPL-MCNC: 154 MG/DL (ref 100–199)
HBA1C MFR BLD: 6.6 % (ref 4.8–5.6)
HDLC SERPL-MCNC: 45 MG/DL
IMP & REVIEW OF LAB RESULTS: NORMAL
LDLC SERPL CALC-MCNC: 92 MG/DL (ref 0–99)
Lab: NORMAL
TRIGL SERPL-MCNC: 91 MG/DL (ref 0–149)
VLDLC SERPL CALC-MCNC: 17 MG/DL (ref 5–40)

## 2024-11-13 ENCOUNTER — TELEPHONE (OUTPATIENT)
Dept: PHARMACY | Facility: CLINIC | Age: 64
End: 2024-11-13

## 2024-11-13 NOTE — TELEPHONE ENCOUNTER
[facility] CLINICAL PHARMACY: ADHERENCE REVIEW  Identified care gap per [place] fills with [facility]: Diabetes adherence    Medicare and [employer] Dual Special Needs Plan - MRDSNP  MA-PCPi  Per insurer report, LIS-3 - co-pays are $0.00 through all phases of the benefit, regardless of the days' supply dispensed.  Patient also appears to be prescribed: ACE/ARB    ASSESSMENT    ACE/ARB ADHERENCE    Insurance Records claims through  11.11.24  (Prior Year PDC = 100% - PASSED ; YTD PDC = 95% - PASSED ):   LISINOPRIL TAB 20 MG last filled on 09.25.24 for 90 day supply. Next refill due: 12.24.24    BP Readings from Last 3 Encounters:   10/03/24 118/70   09/06/23 109/66   12/19/22 133/74     CrCl cannot be calculated (Patient's most recent lab result is older than the maximum 180 days allowed.).  Lab Results   Component Value Date    CREATININE 0.71 02/13/2024     Lab Results   Component Value Date    K 3.5 02/13/2024     DIABETES ADHERENCE    Insurance Records claims through  11.11.24  (Prior Year PDC = not reported; YTD PDC = 77%; Potential Fail Date: 11.24.24):   TRULICITY INJ 3/0.5 ML last filled on 10.19.24 for 28 day supply. Next refill due: 11.16.24    Prescribed sig:  Inject 3 mg into the skin once a week    Per Reconcile Dispense History and Insurer Portal: last filled on 10.19.24 for 28 day supply.     Per [facility]: Billed through [place]. 7 refills remaining. will get 84 day supply ready to  since past due.    Lab Results   Component Value Date    LABA1C 6.6 (H) 10/07/2024    LABA1C 6.3 (H) 02/13/2024    LABA1C 6.2 (H) 04/17/2023     PLAN  The following are interventions that have been identified:   Patient is due to refill TRULICITY INJ 3/0.5 ML and active on home medication list.   Refill/s of TRULICITY INJ 3/0.5 ML READY to  at patient's pharmacy [facility].  Patient eligible for 100 day supply    Outreach:  Pharmacy:  [facility] will fill 84 day supply of TRULICITY INJ 3/0.5 ML today

## 2024-11-24 DIAGNOSIS — I10 ESSENTIAL (PRIMARY) HYPERTENSION: ICD-10-CM

## 2024-11-25 DIAGNOSIS — I10 ESSENTIAL (PRIMARY) HYPERTENSION: ICD-10-CM

## 2024-11-25 RX ORDER — CLOPIDOGREL BISULFATE 75 MG/1
75 TABLET ORAL DAILY
Qty: 90 TABLET | Refills: 3 | Status: SHIPPED | OUTPATIENT
Start: 2024-11-25

## 2024-11-25 RX ORDER — AMLODIPINE BESYLATE 5 MG/1
10 TABLET ORAL DAILY
Qty: 180 TABLET | Refills: 1 | Status: SHIPPED | OUTPATIENT
Start: 2024-11-25

## 2024-11-25 RX ORDER — AMLODIPINE BESYLATE 5 MG/1
5 TABLET ORAL DAILY
Qty: 90 TABLET | Refills: 1 | Status: SHIPPED | OUTPATIENT
Start: 2024-11-25

## 2024-11-25 NOTE — TELEPHONE ENCOUNTER
[facility]   [id number] [facility]    AMLODIPINE BESYLATE 10 MG TAB    QTY 90    Take 1 tablet by mouth every day

## 2024-11-29 RX ORDER — GLIPIZIDE 5 MG/1
TABLET ORAL
Qty: 360 TABLET | Refills: 1 | Status: SHIPPED | OUTPATIENT
Start: 2024-11-29

## 2025-01-05 DIAGNOSIS — R60.0 LOCALIZED EDEMA: ICD-10-CM

## 2025-01-06 RX ORDER — FUROSEMIDE 20 MG/1
20 TABLET ORAL DAILY
Qty: 90 TABLET | Refills: 1 | Status: SHIPPED | OUTPATIENT
Start: 2025-01-06

## 2025-02-05 ENCOUNTER — OFFICE VISIT (OUTPATIENT)
Facility: CLINIC | Age: 65
End: 2025-02-05

## 2025-02-05 VITALS
DIASTOLIC BLOOD PRESSURE: 92 MMHG | WEIGHT: 123 LBS | BODY MASS INDEX: 19.77 KG/M2 | HEART RATE: 102 BPM | SYSTOLIC BLOOD PRESSURE: 165 MMHG | OXYGEN SATURATION: 93 % | HEIGHT: 66 IN | TEMPERATURE: 97.8 F

## 2025-02-05 DIAGNOSIS — M25.512 CHRONIC LEFT SHOULDER PAIN: Primary | ICD-10-CM

## 2025-02-05 DIAGNOSIS — G89.29 CHRONIC LEFT SHOULDER PAIN: Primary | ICD-10-CM

## 2025-02-05 RX ORDER — LIDOCAINE 4 G/G
1 PATCH TOPICAL DAILY
Qty: 30 PATCH | Refills: 0 | Status: SHIPPED | OUTPATIENT
Start: 2025-02-05 | End: 2025-03-07

## 2025-02-05 SDOH — ECONOMIC STABILITY: FOOD INSECURITY: WITHIN THE PAST 12 MONTHS, YOU WORRIED THAT YOUR FOOD WOULD RUN OUT BEFORE YOU GOT MONEY TO BUY MORE.: NEVER TRUE

## 2025-02-05 SDOH — ECONOMIC STABILITY: FOOD INSECURITY: WITHIN THE PAST 12 MONTHS, THE FOOD YOU BOUGHT JUST DIDN'T LAST AND YOU DIDN'T HAVE MONEY TO GET MORE.: NEVER TRUE

## 2025-02-05 ASSESSMENT — PATIENT HEALTH QUESTIONNAIRE - PHQ9
1. LITTLE INTEREST OR PLEASURE IN DOING THINGS: NOT AT ALL
SUM OF ALL RESPONSES TO PHQ QUESTIONS 1-9: 0
2. FEELING DOWN, DEPRESSED OR HOPELESS: NOT AT ALL
SUM OF ALL RESPONSES TO PHQ QUESTIONS 1-9: 0
SUM OF ALL RESPONSES TO PHQ9 QUESTIONS 1 & 2: 0

## 2025-02-05 NOTE — PROGRESS NOTES
Chief Complaint   Patient presents with    Shoulder Pain     \"Have you been to the ER, urgent care clinic since your last visit?  Hospitalized since your last visit?\"    NO    “Have you seen or consulted any other health care providers outside of [facility] since your last visit?”    NO     “Have you had a pap smear?”    NO    Date of last Cervical Cancer screen (HPV or PAP): 5/13/2016             Click Here for Release of Records Request  
Tobacco Use: Former     Smokeless Tobacco Use: Never     Passive Exposure: Not on file   Physical Activity: Insufficiently Active (10/3/2024)    Exercise Vital Sign     Days of Exercise per Week: 4 days     Minutes of Exercise per Session: 30 min   Stress: Not on file   Social Connections: Not on file   Intimate Partner Violence: Not on file   Interpersonal Safety: Not on file       HISTORICAL  Reviewed and updated today, and as noted below:    Past Medical History:   Diagnosis Date    Chronic gout of multiple sites 10/24/2017    Depression     on Zoloft    Diabetes (HCC)     Essential hypertension 2016    Essential hypertension 10/24/2017    History of cerebral aneurysm repair 11/10/2015    History of stroke 2015    Migraine without aura and without status migrainosus, not intractable 2017    Spastic hemiplegia due to cerebrovascular disease (HCC) 2018    Vascular dementia (HCC)      No past surgical history on file.  Family History   Problem Relation Age of Onset    Heart Attack Father     Headache Sister     Migraines Brother     Headache Brother     Stroke Sister     Migraines Sister     Heart Attack Mother      Social History     Socioeconomic History    Marital status:      Spouse name: None    Number of children: None    Years of education: None    Highest education level: None   Tobacco Use    Smoking status: Former     Current packs/day: 0.00     Types: Cigarettes     Quit date: 2015     Years since quittin.5    Smokeless tobacco: Never    Tobacco comments:     Quit smoking: Never used vapor or e-cigs   Vaping Use    Vaping status: Never Used   Substance and Sexual Activity    Alcohol use: No     Alcohol/week: 0.0 standard drinks of alcohol    Drug use: No    Sexual activity: Not Currently     Birth control/protection: None     Social Determinants of Health     Financial Resource Strain: Low Risk  (10/3/2024)    Overall Financial Resource Strain (CARDIA)     Difficulty

## 2025-03-03 ENCOUNTER — TELEPHONE (OUTPATIENT)
Facility: CLINIC | Age: 65
End: 2025-03-03

## 2025-03-03 NOTE — TELEPHONE ENCOUNTER
At pt's last appt BP was evaluated.     Can we call pt, and ask if female has an updated home blood pressure.   We need to update chart, encouage patient to submit an updated reading on [employer] or schedule a nurse viist.

## 2025-04-24 RX ORDER — GLIPIZIDE 5 MG/1
TABLET ORAL
Qty: 360 TABLET | Refills: 1 | Status: SHIPPED | OUTPATIENT
Start: 2025-04-24

## 2025-04-24 RX ORDER — AMLODIPINE BESYLATE 10 MG/1
10 TABLET ORAL DAILY
Qty: 90 TABLET | Refills: 1 | Status: SHIPPED | OUTPATIENT
Start: 2025-04-24

## 2025-05-13 ENCOUNTER — TRANSCRIBE ORDERS (OUTPATIENT)
Facility: HOSPITAL | Age: 65
End: 2025-05-13

## 2025-05-13 DIAGNOSIS — Z12.31 OTHER SCREENING MAMMOGRAM: Primary | ICD-10-CM

## 2025-05-19 DIAGNOSIS — R60.0 LOCALIZED EDEMA: ICD-10-CM

## 2025-05-21 DIAGNOSIS — I10 ESSENTIAL (PRIMARY) HYPERTENSION: Primary | ICD-10-CM

## 2025-05-21 DIAGNOSIS — R60.0 LOCALIZED EDEMA: ICD-10-CM

## 2025-05-21 DIAGNOSIS — E11.9 TYPE 2 DIABETES MELLITUS WITHOUT COMPLICATION, WITHOUT LONG-TERM CURRENT USE OF INSULIN (HCC): Primary | ICD-10-CM

## 2025-05-21 RX ORDER — FUROSEMIDE 20 MG/1
20 TABLET ORAL DAILY
Qty: 10 TABLET | Refills: 0 | Status: SHIPPED | OUTPATIENT
Start: 2025-05-21 | End: 2025-05-21

## 2025-05-21 RX ORDER — FUROSEMIDE 20 MG/1
20 TABLET ORAL DAILY
Qty: 90 TABLET | Refills: 1 | Status: SHIPPED | OUTPATIENT
Start: 2025-05-21 | End: 2025-05-21

## 2025-05-21 RX ORDER — CLOPIDOGREL BISULFATE 75 MG/1
75 TABLET ORAL DAILY
Qty: 90 TABLET | Refills: 1 | Status: SHIPPED | OUTPATIENT
Start: 2025-05-21

## 2025-05-27 ENCOUNTER — HOSPITAL ENCOUNTER (OUTPATIENT)
Facility: HOSPITAL | Age: 65
Discharge: HOME | End: 2025-05-30
Attending: FAMILY MEDICINE
Payer: MEDICARE

## 2025-05-27 DIAGNOSIS — Z12.31 OTHER SCREENING MAMMOGRAM: ICD-10-CM

## 2025-05-27 PROCEDURE — 77067 SCR MAMMO BI INCL CAD: CPT
